# Patient Record
Sex: FEMALE | Race: WHITE | NOT HISPANIC OR LATINO | Employment: PART TIME | URBAN - METROPOLITAN AREA
[De-identification: names, ages, dates, MRNs, and addresses within clinical notes are randomized per-mention and may not be internally consistent; named-entity substitution may affect disease eponyms.]

---

## 2017-01-06 ENCOUNTER — OFFICE VISIT (OUTPATIENT)
Dept: PSYCHIATRY | Facility: CLINIC | Age: 47
End: 2017-01-06
Payer: COMMERCIAL

## 2017-01-06 VITALS
HEART RATE: 84 BPM | TEMPERATURE: 98.2 F | SYSTOLIC BLOOD PRESSURE: 106 MMHG | HEIGHT: 61 IN | BODY MASS INDEX: 38.51 KG/M2 | DIASTOLIC BLOOD PRESSURE: 68 MMHG | WEIGHT: 204 LBS | OXYGEN SATURATION: 94 %

## 2017-01-06 DIAGNOSIS — F41.1 GENERALIZED ANXIETY DISORDER: Primary | ICD-10-CM

## 2017-01-06 DIAGNOSIS — F33.1 MAJOR DEPRESSIVE DISORDER, RECURRENT EPISODE, MODERATE (H): ICD-10-CM

## 2017-01-06 DIAGNOSIS — F33.1 MODERATE RECURRENT MAJOR DEPRESSION (H): ICD-10-CM

## 2017-01-06 PROCEDURE — 99214 OFFICE O/P EST MOD 30 MIN: CPT | Performed by: NURSE PRACTITIONER

## 2017-01-06 RX ORDER — BUPROPION HYDROCHLORIDE 450 MG/1
450 TABLET, FILM COATED, EXTENDED RELEASE ORAL EVERY MORNING
Qty: 90 TABLET | Refills: 1 | Status: SHIPPED | OUTPATIENT
Start: 2017-01-06 | End: 2017-04-26

## 2017-01-06 RX ORDER — BUSPIRONE HYDROCHLORIDE 15 MG/1
15 TABLET ORAL 2 TIMES DAILY
Qty: 180 TABLET | Refills: 1 | Status: SHIPPED | OUTPATIENT
Start: 2017-01-06 | End: 2017-02-09

## 2017-01-06 RX ORDER — PROPRANOLOL HCL 60 MG
60 CAPSULE, EXTENDED RELEASE 24HR ORAL DAILY
Qty: 90 CAPSULE | Refills: 1 | Status: SHIPPED | OUTPATIENT
Start: 2017-01-06 | End: 2017-04-26

## 2017-01-06 ASSESSMENT — ANXIETY QUESTIONNAIRES
3. WORRYING TOO MUCH ABOUT DIFFERENT THINGS: NOT AT ALL
6. BECOMING EASILY ANNOYED OR IRRITABLE: SEVERAL DAYS
5. BEING SO RESTLESS THAT IT IS HARD TO SIT STILL: NOT AT ALL
1. FEELING NERVOUS, ANXIOUS, OR ON EDGE: SEVERAL DAYS
IF YOU CHECKED OFF ANY PROBLEMS ON THIS QUESTIONNAIRE, HOW DIFFICULT HAVE THESE PROBLEMS MADE IT FOR YOU TO DO YOUR WORK, TAKE CARE OF THINGS AT HOME, OR GET ALONG WITH OTHER PEOPLE: SOMEWHAT DIFFICULT
GAD7 TOTAL SCORE: 3
7. FEELING AFRAID AS IF SOMETHING AWFUL MIGHT HAPPEN: NOT AT ALL
2. NOT BEING ABLE TO STOP OR CONTROL WORRYING: SEVERAL DAYS

## 2017-01-06 ASSESSMENT — PATIENT HEALTH QUESTIONNAIRE - PHQ9: 5. POOR APPETITE OR OVEREATING: NOT AT ALL

## 2017-01-06 NOTE — Clinical Note
Brent Weinberg, Psychiatry update. Meeta is doing much better. I will see her for at least one more visit. If she continues to do well, I will return her care to you. If any questions, please let me know. Ivon

## 2017-01-06 NOTE — MR AVS SNAPSHOT
After Visit Summary   1/6/2017    Meeta Rowell    MRN: [id number]           Patient Information     Date Of Birth          1970        Visit Information        Provider Department      1/6/2017 2:45 PM Ivon Tripp NP [facility]        Today's Diagnoses     Generalized anxiety disorder    -  1     Moderate recurrent major depression (H)         Major depressive disorder, recurrent episode, moderate (H)           Care Instructions    Treatment Plan:    Continue Inderal (propranolol) 60 mg by mouth daily.      Continue Buspar (buspirone) 15 mg by mouth in AM and 15 mg by mouth in PM. May consider dose increase up to 30 mg BID.      Continue Wellbutrin (buproprion)  mg by mouth in AM.     Continue trazodone 400 mg by mouth at bedtime. Consider change to Neurontin (gabapentin).    Discontinue Effexor (venlafaxine).    Continue Prozac (fluoxetine) 20 mg by mouth daily. If needed, may increase to 40 mg mouth daily.    Continue all other medications as reviewed per electronic medical record today.     All questions addressed. Education and counseling completed regarding risks and benefits of medications and psychotherapy options.    Safety plan was reviewed. To the Emergency Department as needed or call after hours crisis line at [phone] or [phone].     To schedule individual or family therapy, call [facility] at [phone].    Schedule an appointment with me in 2 months or sooner as needed. Call [facility] at [phone] to schedule.    Follow up with primary care provider as planned or for acute medical concerns.    Call the psychiatric nurse line with medication questions or concerns at [phone].    My Practice Policy was reviewed and signed: YES     MyChart may be used to communicate with your provider, but this is not intended to be used for emergencies.        Follow-ups after your visit        Your next 10  "appointments already scheduled     Feb 23, 2017  2:30 PM   [facility]    [address]  [facility] [address] [phone]              Who to contact     If you have questions or need follow up information about today's clinic visit or your schedule please contact [facility] directly at [phone].  Normal or non-critical lab and imaging results will be communicated to you by [employer], letter or phone within 4 business days after the clinic has received the results. If you do not hear from us within 7 days, please contact the clinic through [employer] or phone. If you have a critical or abnormal lab result, we will notify you by phone as soon as possible.  Submit refill requests through [employer] or call your pharmacy and they will forward the refill request to us. Please allow 3 business days for your refill to be completed.          Additional Information About Your Visit        [employer] Information     [employer] gives you secure access to your electronic health record. If you see a primary care provider, you can also send messages to your care team and make appointments. If you have questions, please call your primary care clinic.  If you do not have a primary care provider, please call [phone] and they will assist you.        Care EveryWhere ID     This is your Care EveryWhere ID. This could be used by other organizations to access your [facility] medical records  [id number]        Your Vitals Were     Pulse Temperature Height BMI (Body Mass Index) Pulse Oximetry       84 98.2  F (36.8  C) (Oral) 5' 1\" (1.549 m) 38.57 kg/m2 94%        Blood Pressure from Last 3 Encounters:   01/06/17 106/68   12/15/16 108/72   12/05/16 122/70    Weight from Last 3 Encounters:   01/06/17 204 lb (92.534 kg)   12/15/16 203 lb (92.08 kg)   12/05/16 202 lb (91.627 kg)              Today, you had the following  "    No orders found for display         Today's Medication Changes          These changes are accurate as of: 1/6/17  3:20 PM.  If you have any questions, ask your nurse or doctor.               These medicines have changed or have updated prescriptions.        Dose/Directions    BuPROPion HCl ER (XL) 450 MG Tb24   This may have changed:    - medication strength  - how much to take  - additional instructions   Used for:  Moderate recurrent major depression (H)   Changed by:  Ivon Tripp NP        Dose:  450 mg   Take 450 mg by mouth every morning Fill at Patient request. Can substitute one 150 mg and one 300 mg if needed.   Quantity:  90 tablet   Refills:  1            Where to get your medicines      [place] [facility] [address] [facility]  [address] [facility] [address]     Phone:  [phone]    - BuPROPion HCl ER (XL) 450 MG Tb24  - busPIRone 15 MG tablet  - propranolol 60 MG 24 hr capsule             Primary Care Provider Office Phone # Fax #    Jesenia Weinberg [phone]       [address] [place] [address]        Thank you!     Thank you for choosing [facility]  for your care. Our goal is always to provide you with excellent care. Hearing back from our patients is one way we can continue to improve our services. Please take a few minutes to complete the written survey that you may receive in the mail after your visit with us. Thank you!             Your Updated Medication List - Protect others around you: Learn how to safely use, store and throw away your medicines at www.disposemymeds.org.          This list is accurate as of: 1/6/17  3:20 PM.  Always use your most recent med list.                   Brand Name Dispense Instructions for use    BuPROPion HCl ER (XL) 450 MG Tb24     90 tablet    Take 450 mg by mouth every morning Fill at Patient request. Can substitute one  150 mg and one 300 mg if needed.       busPIRone 15 MG tablet    BUSPAR    180 tablet    Take 1 tablet (15 mg) by mouth 2 times daily       drospirenone-ethinyl estradiol 3-0.03 MG per tablet    KANA 28    112 tablet    Take 1 tablet by mouth daily Take active pills daily       FLUoxetine 20 MG capsule    PROzac    60 capsule    Start Prozac (fluoxetine) 20 mg by mouth daily for one week then increase to 40 mg by mouth daily. For depression and anxiety.       LANsoprazole 30 MG CR capsule    PREVACID     Take 30 mg by mouth daily       levothyroxine 200 MCG tablet    SYNTHROID/LEVOTHROID    90 tablet    Take 1 tablet (200 mcg) by mouth daily       propranolol 60 MG 24 hr capsule    INDERAL LA    90 capsule    Take 1 capsule (60 mg) by mouth daily       REMICADE 100 MG injection   Generic drug:  inFLIXimab      Inject 3 mg/kg into the vein       traZODone 100 MG tablet    DESYREL    120 tablet    Take 1-4 tablets (100-400 mg) by mouth At Bedtime

## 2017-01-06 NOTE — NURSING NOTE
"Chief Complaint   Patient presents with     RECHECK     F/U on depression/anxiety       Initial /68 mmHg  Pulse 84  Temp(Src) 98.2  F (36.8  C) (Oral)  Ht 5' 1\" (1.549 m)  Wt 204 lb (92.534 kg)  BMI 38.57 kg/m2  SpO2 94% Estimated body mass index is 38.57 kg/(m^2) as calculated from the following:    Height as of this encounter: 5' 1\" (1.549 m).    Weight as of this encounter: 204 lb (92.534 kg).  BP completed using cuff size: blaise Nesbitt MA      "

## 2017-01-06 NOTE — PROGRESS NOTES
"    Outpatient Psychiatric Progress Note    Name: Meeta Rowell   : 1970  Date: 2017                         Primary Care Provider: Jesenia Weinberg MD  Therapist: None    PHQ-9 scores:  PHQ-9 SCORE 2016   Total Score 15 18 9       JANIS-7 scores:  JANIS-7 SCORE 2016   Total Score 6 9 3       Patient Identification:  Patient is a 46 year old year old,   White American female  who presents for return visit with me.  Patient is currently employed part time. Patient attended the session alone. Patient prefers to be called: \"Meeta\"    Interim History:    I last saw Meeta Rowell for outpatient psychiatry Consultation on 2016.     During that appointment, we Continue Inderal (propranolol) 60 mg by mouth daily.      Continue Buspar (buspirone) 15 mg by mouth in AM and 15 mg by mouth in PM. May consider dose increase up to 30 mg BID.      Reduce Wellbutrin (buproprion) XL to 300 mg by mouth in AM.      Continue trazodone 400 mg by mouth at bedtime. Consider change to Neurontin (gabapentin).      Reduce Effexor (venlafaxine) XR to 150 mg by mouth daily starting 2016.  Reduce to 75 mg XR daily starting 2016. Then take 75 mg XR every other day for 2 weeks then stop.      Start Prozac (fluoxetine) 20 mg by mouth daily for one week then increase to 40 mg by mouth daily. For depression and anxiety and for discontinuation syndrome symptoms.       Current medications include:   Current Outpatient Prescriptions   Medication Sig     busPIRone (BUSPAR) 15 MG tablet Take 1 tablet (15 mg) by mouth 2 times daily     buPROPion (WELLBUTRIN XL) 150 MG 24 hr tablet Take 2 tablets (300 mg) by mouth every morning Reduced dose. Fill at Patient request.     FLUoxetine (PROZAC) 20 MG capsule Start Prozac (fluoxetine) 20 mg by mouth daily for one week then increase to 40 mg by mouth daily. For depression and anxiety.     propranolol (INDERAL LA) 60 MG capsule " "Take 1 capsule (60 mg) by mouth daily     levothyroxine (SYNTHROID, LEVOTHROID) 200 MCG tablet Take 1 tablet (200 mcg) by mouth daily     traZODone (DESYREL) 100 MG tablet Take 1-4 tablets (100-400 mg) by mouth At Bedtime     venlafaxine (EFFEXOR-XR) 75 MG 24 hr capsule Take 3 capsules (225 mg) by mouth daily     drospirenone-ethinyl estradiol (KANA 28) 3-0.03 MG per tablet Take 1 tablet by mouth daily Take active pills daily     inFLIXimab (REMICADE) 100 MG injection Inject 3 mg/kg into the vein     LANsoprazole (PREVACID) 30 MG capsule Take 30 mg by mouth daily      No current facility-administered medications for this visit.       The [place] Prescription Monitoring Program has been reviewed and there are no concerns about diversionary activity for controlled substances at this time.      Still taking Wellbutrin (buproprion)  mg, Effexor (venlafaxine) 75 mg, Prozac (fluoxetine) 20 mg    Meeta Rowell reports mood has been: \"more depression over the holidays\"  Anxiety has been: \"a lot better\" No panic attacks.   Sleep has been: \"good\" no nightmares or strange dreams.  PHQ9 and GAD7 scores were reviewed today.   Medication side effects: Denies  Current stressors include:  Financial Stress, IRS Audit, Lost Home, Bro wife relationship stress  Coping mechanisms and supports include: Family, Hobbies and Friends, Reading, Video Games, TV, Coloring     Past Medical History   Diagnosis Date     Personal history of other genital system and obstetric disorders(V13.29)      Regional enteritis of small intestine (H) 2003     ileal disease     Thyroid disease      hypo     Crohn's disease (H)      Endometriosis      GERD (gastroesophageal reflux disease)      Depression      Cancer (H) 12/11/15     thyroid      has a past medical history of Personal history of other genital system and obstetric disorders(V13.29); Regional enteritis of small intestine (H) (2003); Thyroid disease; Crohn's disease (H); Endometriosis; " "GERD (gastroesophageal reflux disease); Depression; and Cancer (H) (12/11/15). She also has no past medical history of PONV (postoperative nausea and vomiting), Malignant hyperthermia, Coagulation disorder (H), Thrombosis of leg, Chronic infection, or Sleep apnea.    Social History:  Current Living situation:  [place], [place] with Spouse/Partner and 2 sons and dog . Feels safe at home.  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    Review of Systems:  10 systems (general, cardiovascular, respiratory, eyes, ENT, endocrine, GI, , M/S, neurological) were reviewed. Most pertinent finding(s) is/are: chronic pain, fatigue, dry mouth more lately. The remaining systems are all unremarkable.    Mental Status Examination:  Appearance:  awake, alert, adequately groomed, no apparent distress and moderately obese  Attitude:  cooperative    Eye Contact:  adequate  Gait and Station: Normal, No assistive Devices used and No dizziness or falls  Psychomotor Behavior:  no evidence of tardive dyskinesia, dystonia, or tics  Oriented to:  time, person, and place  Attention Span and Concentration:  adequate  Speech:  clear, coherent, regular rate, regular rhythm and fluent  Language: Able to read and write  Mood:  \"better\"  Affect:  intensity is brighter, reactive  Associations:  no loose associations  Thought Process:  logical, linear and goal oriented  Thought Content:  no evidence of suicidal ideation or homicidal ideation, no evidence of psychotic thought, no auditory hallucinations present, no visual " hallucinations present and Appropriate to Interview  Recent and Remote Memory:  intact Not formally assessed.  Fund of Knowledge: appropriate  Insight:  good  Judgment:  intact  Impulse Control:  intact    Suicide Risk Assessment:  Today Meeta Rowell reports psychosocial stressors. In addition, there are notable risk factors for self-harm, including age and anxiety However, risk is mitigated by commitment to family, absence of past attempts, ability to volunteer a safety plan, history of seeking help when needed, future oriented, no access to firearms or weapons, denies suicidal intent or plan and denies homicidal ideation, intent, or plan. Therefore, based on all available evidence including the factors cited above, Meeta Rowell does not appear to be at imminent risk for self-harm, does not meet criteria for a 72-hr hold, and therefore remains appropriate for ongoing outpatient level of care.  A thorough assessment of risk factors related to suicide and self-harm have been reviewed and are noted above. There was no deceit detected, and the patient presented in a manner that was believable.     DSM5 Diagnosis:  296.33 Major Depressive Disorder, Recurrent Episode, Severe   300.02 (F41.1) Generalized Anxiety Disorder  309.81 (F43.10) Posttraumatic Stress Disorder  Without dissociative symptoms  Obesity per BMI of 38.55    Medical comorbidities include:   Patient Active Problem List    Diagnosis Date Noted     Generalized anxiety disorder 11/11/2016     Priority: Medium     Papillary carcinoma in situ 12/29/2015     Priority: Medium     Four foci on thyroid pathology, has endo appointment scheduled       Postsurgical hypothyroidism 12/28/2015     Priority: Medium     Thyroid cancer (H) 12/11/2015     Priority: Medium     Regional enteritis (H) 10/14/2015     Priority: Medium     Overview:   Followed by [place] [facility], Remicade since 2003       Obesity 10/08/2013     Priority: Medium     Impaired fasting glucose  07/27/2010     Priority: Medium     CARDIOVASCULAR SCREENING; LDL GOAL LESS THAN 160 02/10/2010     Priority: Medium     Moderate recurrent major depression (H) 07/14/2009     Priority: Medium     Major depressive disorder, recurrent episode, moderate (H) 07/14/2009     Priority: Medium     Regional enteritis of small intestine 04/08/2003     Priority: Medium     Crohns disease, well controlled on remicade.  Follows with [facility].         Psychosocial & Contextual Factors:  Occupational Difficulties, Parent/Child Relationship Difficulties, Financial Difficulties, Relationship Difficulties, Phase of Life Difficulties and Medical Comorbidites    Assessment:  Meeta Rowell reports improvement in symptoms. Medication side effects and alternatives were reviewed.     Treatment Plan:    Continue Inderal (propranolol) 60 mg by mouth daily.      Continue Buspar (buspirone) 15 mg by mouth in AM and 15 mg by mouth in PM. May consider dose increase up to 30 mg BID.      Continue Wellbutrin (buproprion)  mg by mouth in AM.     Continue trazodone 400 mg by mouth at bedtime. Consider change to Neurontin (gabapentin).    Discontinue Effexor (venlafaxine).    Continue Prozac (fluoxetine) 20 mg by mouth daily. If needed, may increase to 40 mg mouth daily.    Continue all other medications as reviewed per electronic medical record today.     All questions addressed. Education and counseling completed regarding risks and benefits of medications and psychotherapy options.    Safety plan was reviewed. To the Emergency Department as needed or call after hours crisis line at [phone] or [phone].     To schedule individual or family therapy, call [facility] at [phone].    Schedule an appointment with me in 2 months or sooner as needed. Call [facility] at [phone] to schedule.    Follow up with primary care provider as planned or for acute medical concerns.    Call the psychiatric  nurse line with medication questions or concerns at [phone].    My Practice Policy was reviewed and signed: YES     MyChart may be used to communicate with your provider, but this is not intended to be used for emergencies.    Administrative Billing:   Time spent with patient was 30 minutes and greater than 50% of time or 20 minutes was spent in counseling and coordination of care.    Signed:   Ivon Tripp, PhD, APRN, CNP   Psychiatry

## 2017-01-06 NOTE — PATIENT INSTRUCTIONS
Treatment Plan:    Continue Inderal (propranolol) 60 mg by mouth daily.      Continue Buspar (buspirone) 15 mg by mouth in AM and 15 mg by mouth in PM. May consider dose increase up to 30 mg BID.      Continue Wellbutrin (buproprion)  mg by mouth in AM.     Continue trazodone 400 mg by mouth at bedtime. Consider change to Neurontin (gabapentin).    Discontinue Effexor (venlafaxine).    Continue Prozac (fluoxetine) 20 mg by mouth daily. If needed, may increase to 40 mg mouth daily.    Continue all other medications as reviewed per electronic medical record today.     All questions addressed. Education and counseling completed regarding risks and benefits of medications and psychotherapy options.    Safety plan was reviewed. To the Emergency Department as needed or call after hours crisis line at [phone] or [phone].     To schedule individual or family therapy, call [facility] at [phone].    Schedule an appointment with me in 2 months or sooner as needed. Call [facility] at [phone] to schedule.    Follow up with primary care provider as planned or for acute medical concerns.    Call the psychiatric nurse line with medication questions or concerns at [phone].    My Practice Policy was reviewed and signed: YES     MyChart may be used to communicate with your provider, but this is not intended to be used for emergencies.

## 2017-01-07 ASSESSMENT — PATIENT HEALTH QUESTIONNAIRE - PHQ9: SUM OF ALL RESPONSES TO PHQ QUESTIONS 1-9: 9

## 2017-01-07 ASSESSMENT — ANXIETY QUESTIONNAIRES: GAD7 TOTAL SCORE: 3

## 2017-02-08 ENCOUNTER — TELEPHONE (OUTPATIENT)
Dept: PSYCHIATRY | Facility: CLINIC | Age: 47
End: 2017-02-08

## 2017-02-08 DIAGNOSIS — F33.1 MAJOR DEPRESSIVE DISORDER, RECURRENT EPISODE, MODERATE (H): Primary | ICD-10-CM

## 2017-02-09 RX ORDER — BUSPIRONE HYDROCHLORIDE 30 MG/1
30 TABLET ORAL 2 TIMES DAILY
Qty: 60 TABLET | Refills: 1 | Status: SHIPPED | OUTPATIENT
Start: 2017-02-09 | End: 2017-04-26

## 2017-02-09 NOTE — TELEPHONE ENCOUNTER
Meeta Rowell calling to request increase in her Buspar. Pt reported she spoke with Ivon Tripp, PhD, APRN, CNP about this at her last appt in January. Will route to provider.     From Ivon Tripp, PhD, APRN, CNP treatment plan 01/06/17  Treatment Plan:    Continue Inderal (propranolol) 60 mg by mouth daily.      Continue Buspar (buspirone) 15 mg by mouth in AM and 15 mg by mouth in PM. May consider dose increase up to 30 mg BID.      Continue Wellbutrin (buproprion)  mg by mouth in AM.    Continue trazodone 400 mg by mouth at bedtime. Consider change to Neurontin (gabapentin).    Discontinue Effexor (venlafaxine).    Continue Prozac (fluoxetine) 20 mg by mouth daily. If needed, may increase to 40 mg mouth daily.    Continue all other medications as reviewed per electronic medical record today.      All questions addressed. Education and counseling completed regarding risks and benefits of medications and psychotherapy options.    Safety plan was reviewed. To the Emergency Department as needed or call after hours crisis line at [phone] or [phone].      To schedule individual or family therapy, call [facility] at [phone].    Schedule an appointment with me in 2 months or sooner as needed. Call [facility] at [phone] to schedule.    Follow up with primary care provider as planned or for acute medical concerns.    Call the psychiatric nurse line with medication questions or concerns at [phone].    My Practice Policy was reviewed and signed: YES      MyChart may be used to communicate with your provider, but this is not intended to be used for emergencies.    Administrative Billing:   Time spent with patient was 30 minutes and greater than 50% of time or 20 minutes was spent in counseling and coordination of care.    Signed:    Ivon Tripp, PhD, APRN, CNP    Psychiatry

## 2017-02-27 ENCOUNTER — TELEPHONE (OUTPATIENT)
Dept: PSYCHIATRY | Facility: CLINIC | Age: 47
End: 2017-02-27

## 2017-02-27 DIAGNOSIS — F33.1 MODERATE RECURRENT MAJOR DEPRESSION (H): ICD-10-CM

## 2017-02-27 RX ORDER — FLUOXETINE 40 MG/1
40 CAPSULE ORAL DAILY
Qty: 30 CAPSULE | Refills: 0 | Status: SHIPPED | OUTPATIENT
Start: 2017-02-27 | End: 2017-03-27

## 2017-02-27 NOTE — TELEPHONE ENCOUNTER
I spoke to Meeta Rowell. We refilled this medication per RN protocol. She was transferred to [facility].    Last Office Visit with [facility] primary care provider: 01/06/2017.  Next 5 appointments (look out 90 days)     Mar 22, 2017  2:45 PM CDT   [facility]von Tripp [facility]    [address]  [address]  [facility] [address] [phone]                   Last PHQ-9 score on record=   PHQ-9 SCORE 1/6/2017   Total Score MyChart -   Total Score 9

## 2017-02-27 NOTE — TELEPHONE ENCOUNTER
Reason for call: Patient is out of Prozac and said the pharmacy has called twice trying to get it refilled.  She is completely out and concerned about side effects of not taking it.     Can we leave a detailed message: Yes

## 2017-03-03 ENCOUNTER — TRANSFERRED RECORDS (OUTPATIENT)
Dept: HEALTH INFORMATION MANAGEMENT | Facility: CLINIC | Age: 47
End: 2017-03-03

## 2017-03-27 ENCOUNTER — MYC REFILL (OUTPATIENT)
Dept: PSYCHIATRY | Facility: CLINIC | Age: 47
End: 2017-03-27

## 2017-03-27 DIAGNOSIS — F33.1 MODERATE RECURRENT MAJOR DEPRESSION (H): ICD-10-CM

## 2017-03-27 RX ORDER — FLUOXETINE 40 MG/1
40 CAPSULE ORAL DAILY
Qty: 14 CAPSULE | Refills: 0 | Status: SHIPPED | OUTPATIENT
Start: 2017-03-27 | End: 2017-04-12

## 2017-03-27 NOTE — TELEPHONE ENCOUNTER
Chart reviewed. Patient was given a bridge refill of Prozac earlier in March. She had a scheduled appointment with Ivon on 3/22/17, but late cancelled this. She will needs another appointment.     Me        2/27/17 2:07 PM   Note      I spoke to Meeta Rowell. We refilled this medication per RN protocol. She was transferred to scheduling.     Last Office Visit with [facility] primary care provider: 01/06/2017.       Next 5 appointments (look out 90 days)      Mar 22, 2017 2:45 PM CDT   [facility] Visit with Ivon Tripp NP   [facility]     [address]  [address]  [facility] [address] [phone]                         Last PHQ-9 score on record=   PHQ-9 SCORE 1/6/2017   Total Score MyChart -   Total Score 9

## 2017-03-27 NOTE — TELEPHONE ENCOUNTER
Message from [employer]Index[employer]:  Original authorizing provider: TOBI Lemus would like a refill of the following medications:  FLUoxetine (PROZAC) 40 MG capsule [Ivon Tripp NP]    Preferred pharmacy: [facility] [address] [facility]    Comment:

## 2017-04-12 ENCOUNTER — MYC REFILL (OUTPATIENT)
Dept: PSYCHIATRY | Facility: CLINIC | Age: 47
End: 2017-04-12

## 2017-04-12 DIAGNOSIS — F33.1 MODERATE RECURRENT MAJOR DEPRESSION (H): ICD-10-CM

## 2017-04-12 RX ORDER — FLUOXETINE 40 MG/1
40 CAPSULE ORAL DAILY
Qty: 14 CAPSULE | Refills: 0 | Status: SHIPPED | OUTPATIENT
Start: 2017-04-12 | End: 2017-04-26

## 2017-04-12 NOTE — TELEPHONE ENCOUNTER
Message from PeerPongt:  Original authorizing provider: TOBI Lemus would like a refill of the following medications:  FLUoxetine (PROZAC) 40 MG capsule [Ivon Tripp NP]    Preferred pharmacy: [facility] [address] [facility]    Comment:  I have a follow up appointment on 4/26, its the earliest I could get. Would you please give me enough to last through 4/26? Thanks

## 2017-04-26 ENCOUNTER — OFFICE VISIT (OUTPATIENT)
Dept: PSYCHIATRY | Facility: CLINIC | Age: 47
End: 2017-04-26
Payer: COMMERCIAL

## 2017-04-26 VITALS
OXYGEN SATURATION: 97 % | SYSTOLIC BLOOD PRESSURE: 110 MMHG | WEIGHT: 204 LBS | DIASTOLIC BLOOD PRESSURE: 60 MMHG | BODY MASS INDEX: 38.55 KG/M2 | TEMPERATURE: 98.2 F | HEART RATE: 81 BPM

## 2017-04-26 DIAGNOSIS — Z13.220 LIPID SCREENING: ICD-10-CM

## 2017-04-26 DIAGNOSIS — D50.9 IRON DEFICIENCY ANEMIA, UNSPECIFIED IRON DEFICIENCY ANEMIA TYPE: ICD-10-CM

## 2017-04-26 DIAGNOSIS — F33.1 MODERATE RECURRENT MAJOR DEPRESSION (H): ICD-10-CM

## 2017-04-26 DIAGNOSIS — F41.1 GENERALIZED ANXIETY DISORDER: ICD-10-CM

## 2017-04-26 DIAGNOSIS — F33.1 MAJOR DEPRESSIVE DISORDER, RECURRENT EPISODE, MODERATE (H): ICD-10-CM

## 2017-04-26 DIAGNOSIS — G25.81 RESTLESS LEGS SYNDROME (RLS): Primary | ICD-10-CM

## 2017-04-26 PROBLEM — E66.01 MORBID OBESITY (H): Status: ACTIVE | Noted: 2017-04-26

## 2017-04-26 PROCEDURE — 83540 ASSAY OF IRON: CPT | Performed by: NURSE PRACTITIONER

## 2017-04-26 PROCEDURE — 83550 IRON BINDING TEST: CPT | Performed by: NURSE PRACTITIONER

## 2017-04-26 PROCEDURE — 82728 ASSAY OF FERRITIN: CPT | Performed by: NURSE PRACTITIONER

## 2017-04-26 PROCEDURE — 99214 OFFICE O/P EST MOD 30 MIN: CPT | Performed by: NURSE PRACTITIONER

## 2017-04-26 PROCEDURE — 36415 COLL VENOUS BLD VENIPUNCTURE: CPT | Performed by: NURSE PRACTITIONER

## 2017-04-26 RX ORDER — BUSPIRONE HYDROCHLORIDE 30 MG/1
30 TABLET ORAL 2 TIMES DAILY
Qty: 180 TABLET | Refills: 1 | Status: SHIPPED | OUTPATIENT
Start: 2017-04-26 | End: 2017-10-26

## 2017-04-26 RX ORDER — FLUOXETINE 40 MG/1
40 CAPSULE ORAL DAILY
Qty: 90 CAPSULE | Refills: 1 | Status: SHIPPED | OUTPATIENT
Start: 2017-04-26 | End: 2017-10-26

## 2017-04-26 RX ORDER — BUPROPION HYDROCHLORIDE 450 MG/1
450 TABLET, FILM COATED, EXTENDED RELEASE ORAL EVERY MORNING
Qty: 90 TABLET | Refills: 1 | Status: SHIPPED | OUTPATIENT
Start: 2017-04-26 | End: 2017-11-17

## 2017-04-26 RX ORDER — PROPRANOLOL HCL 60 MG
60 CAPSULE, EXTENDED RELEASE 24HR ORAL DAILY
Qty: 90 CAPSULE | Refills: 1 | Status: SHIPPED | OUTPATIENT
Start: 2017-04-26 | End: 2017-07-10

## 2017-04-26 ASSESSMENT — ANXIETY QUESTIONNAIRES
2. NOT BEING ABLE TO STOP OR CONTROL WORRYING: NOT AT ALL
3. WORRYING TOO MUCH ABOUT DIFFERENT THINGS: NOT AT ALL
5. BEING SO RESTLESS THAT IT IS HARD TO SIT STILL: NOT AT ALL
IF YOU CHECKED OFF ANY PROBLEMS ON THIS QUESTIONNAIRE, HOW DIFFICULT HAVE THESE PROBLEMS MADE IT FOR YOU TO DO YOUR WORK, TAKE CARE OF THINGS AT HOME, OR GET ALONG WITH OTHER PEOPLE: NOT DIFFICULT AT ALL
6. BECOMING EASILY ANNOYED OR IRRITABLE: NOT AT ALL
GAD7 TOTAL SCORE: 1
7. FEELING AFRAID AS IF SOMETHING AWFUL MIGHT HAPPEN: NOT AT ALL
1. FEELING NERVOUS, ANXIOUS, OR ON EDGE: NOT AT ALL

## 2017-04-26 ASSESSMENT — PATIENT HEALTH QUESTIONNAIRE - PHQ9: 5. POOR APPETITE OR OVEREATING: SEVERAL DAYS

## 2017-04-26 NOTE — Clinical Note
Brent Weinberg, I am sending Meeta back to you for care. She has 6 months worth of medication. More details in my note. Thank you for letting me care for this patient. Ivon

## 2017-04-26 NOTE — PATIENT INSTRUCTIONS
Treatment Plan:    Continue Inderal (propranolol) 60 mg by mouth daily.      Continue Buspar (buspirone) 30 mg by mouth in AM and 30 mg by mouth in PM.     Continue Wellbutrin (buproprion)  mg by mouth in AM.     Continue trazodone 400 mg by mouth at bedtime.     Continue Prozac (fluoxetine) 40 mg by mouth daily.    Continue all other medications as reviewed per electronic medical record today.     All questions addressed. Education and counseling completed regarding risks and benefits of medications and psychotherapy options.    Safety plan was reviewed. To the Emergency Department as needed or call after hours crisis line at [phone] or [phone].     To schedule individual or family therapy, call [facility] at [phone].     Schedule an appointment with me as needed. Call [facility] at [phone] to schedule.  Thank you for our work together in the Psychiatry Collaborative Care Model at [facility]. This is our last visit and I am returning your care back to your Primary Care Provider Jesenia Weinberg MD . If you are not doing well, please contact your Primary Care Provider office.     Follow up with primary care provider as planned or for acute medical concerns.    Call the psychiatric nurse line with medication questions or concerns at [phone].    My Practice Policy was reviewed and signed: YES     MyChart may be used to communicate with your provider, but this is not intended to be used for emergencies.

## 2017-04-26 NOTE — MR AVS SNAPSHOT
After Visit Summary   4/26/2017    Meeta Rowell    MRN: [id number]           Patient Information     Date Of Birth          1970        Visit Information        Provider Department      4/26/2017 12:45 PM Ivon Tripp NP [facility]        Today's Diagnoses     Moderate recurrent major depression (H)        Generalized anxiety disorder        Major depressive disorder, recurrent episode, moderate (H)          Care Instructions    Treatment Plan:    Continue Inderal (propranolol) 60 mg by mouth daily.      Continue Buspar (buspirone) 30 mg by mouth in AM and 30 mg by mouth in PM.     Continue Wellbutrin (buproprion)  mg by mouth in AM.     Continue trazodone 400 mg by mouth at bedtime.     Continue Prozac (fluoxetine) 40 mg by mouth daily.    Continue all other medications as reviewed per electronic medical record today.     All questions addressed. Education and counseling completed regarding risks and benefits of medications and psychotherapy options.    Safety plan was reviewed. To the Emergency Department as needed or call after hours crisis line at [phone] or [phone].     To schedule individual or family therapy, call [facility] at [phone].     Schedule an appointment with me as needed. Call [facility] at [phone] to schedule.  Thank you for our work together in the Psychiatry Collaborative Care Model at [facility]. This is our last visit and I am returning your care back to your Primary Care Provider Jesenia Weinberg MD . If you are not doing well, please contact your Primary Care Provider office.     Follow up with primary care provider as planned or for acute medical concerns.    Call the psychiatric nurse line with medication questions or concerns at [phone].    My Practice Policy was reviewed and signed: YES     MyChart may be used to communicate with your provider, but this is not  intended to be used for emergencies.        Follow-ups after your visit        Follow-up notes from your care team     Return if symptoms worsen or fail to improve.      Who to contact     If you have questions or need follow up information about today's clinic visit or your schedule please contact [facility] directly at [phone].  Normal or non-critical lab and imaging results will be communicated to you by MyChart, letter or phone within 4 business days after the clinic has received the results. If you do not hear from us within 7 days, please contact the clinic through [employer] or phone. If you have a critical or abnormal lab result, we will notify you by phone as soon as possible.  Submit refill requests through [employer] or call your pharmacy and they will forward the refill request to us. Please allow 3 business days for your refill to be completed.          Additional Information About Your Visit        MyChart Information     [employer] gives you secure access to your electronic health record. If you see a primary care provider, you can also send messages to your care team and make appointments. If you have questions, please call your primary care clinic.  If you do not have a primary care provider, please call [phone] and they will assist you.        Care EveryWhere ID     This is your Care EveryWhere ID. This could be used by other organizations to access your [facility] medical records  [id number]        Your Vitals Were     Pulse Temperature Pulse Oximetry BMI (Body Mass Index)          81 98.2  F (36.8  C) (Oral) 97% 38.55 kg/m2         Blood Pressure from Last 3 Encounters:   04/26/17 110/60   01/06/17 106/68   12/15/16 108/72    Weight from Last 3 Encounters:   04/26/17 204 lb (92.5 kg)   01/06/17 204 lb (92.5 kg)   12/15/16 203 lb (92.1 kg)              Today, you had the following     No orders found for display         Today's Medication Changes          These changes are  accurate as of: 4/26/17  1:12 PM.  If you have any questions, ask your nurse or doctor.               These medicines have changed or have updated prescriptions.        Dose/Directions    BusPIRone HCl 30 MG Tabs   This may have changed:  additional instructions   Used for:  Major depressive disorder, recurrent episode, moderate (H)   Changed by:  Ivon Tripp NP        Dose:  30 mg   Take 30 mg by mouth 2 times daily   Quantity:  180 tablet   Refills:  1            Where to get your medicines      These [place] [facility] [address] [facility]  [address] [facility] [address]     Phone:  [phone]     BuPROPion HCl ER (XL) 450 MG Tb24    BusPIRone HCl 30 MG Tabs    FLUoxetine 40 MG capsule    propranolol 60 MG 24 hr capsule                Primary Care Provider Office Phone # Fax #    Jesenia Weinberg [phone]       [address] [place] [address]        Thank you!     Thank you for choosing [facility]  for your care. Our goal is always to provide you with excellent care. Hearing back from our patients is one way we can continue to improve our services. Please take a few minutes to complete the written survey that you may receive in the mail after your visit with us. Thank you!             Your Updated Medication List - Protect others around you: Learn how to safely use, store and throw away your medicines at www.disposemymeds.org.          This list is accurate as of: 4/26/17  1:12 PM.  Always use your most recent med list.                   Brand Name Dispense Instructions for use    BuPROPion HCl ER (XL) 450 MG Tb24     90 tablet    Take 450 mg by mouth every morning Fill at Patient request. Can substitute one 150 mg and one 300 mg if needed.       BusPIRone HCl 30 MG Tabs     180 tablet    Take 30 mg by mouth 2 times daily       drospirenone-ethinyl estradiol 3-0.03 MG  per tablet    KANA 28    112 tablet    Take 1 tablet by mouth daily Take active pills daily       FLUoxetine 40 MG capsule    PROzac    90 capsule    Take 1 capsule (40 mg) by mouth daily       LANsoprazole 30 MG CR capsule    PREVACID     Take 30 mg by mouth daily       levothyroxine 200 MCG tablet    SYNTHROID/LEVOTHROID    90 tablet    Take 1 tablet (200 mcg) by mouth daily       propranolol 60 MG 24 hr capsule    INDERAL LA    90 capsule    Take 1 capsule (60 mg) by mouth daily       REMICADE 100 MG injection   Generic drug:  inFLIXimab      Inject 3 mg/kg into the vein       traZODone 100 MG tablet    DESYREL    120 tablet    Take 1-4 tablets (100-400 mg) by mouth At Bedtime

## 2017-04-26 NOTE — PROGRESS NOTES
"    Outpatient Psychiatric Progress Note    Name: Meeta Rowell   : 1970                    Primary Care Provider: Jesenia Weinberg MD - last visit 2016  Therapist: None    PHQ-9 scores:  PHQ-9 SCORE 2016   Total Score 18 9 8       JANIS-7 scores:  JANIS-7 SCORE 2016   Total Score 9 3 1       Patient Identification:  Patient is a 46 year old year old,  White American female who presents for return visit with me. Patient is currently employed part time. Patient attended the session alone. Patient prefers to be called: \"Meeta\"    Interim History:    I last saw Meeta Rowell for outpatient psychiatry Return Visit on 2017.     During that appointment, we Continue Inderal (propranolol) 60 mg by mouth daily.      Continue Buspar (buspirone) 15 mg by mouth in AM and 15 mg by mouth in PM. May consider dose increase up to 30 mg BID.      Continue Wellbutrin (buproprion)  mg by mouth in AM.     Continue trazodone 400 mg by mouth at bedtime. Consider change to Neurontin (gabapentin).    Discontinue Effexor (venlafaxine).    Continue Prozac (fluoxetine) 20 mg by mouth daily. If needed, may increase to 40 mg mouth daily.     Current medications include:   Current Outpatient Prescriptions   Medication Sig     FLUoxetine (PROZAC) 40 MG capsule Take 1 capsule (40 mg) by mouth daily     busPIRone 30 MG TABS Take 30 mg by mouth 2 times daily Increased dose.     propranolol (INDERAL LA) 60 MG 24 hr capsule Take 1 capsule (60 mg) by mouth daily     buPROPion 450 MG TB24 Take 450 mg by mouth every morning Fill at Patient request. Can substitute one 150 mg and one 300 mg if needed.     levothyroxine (SYNTHROID, LEVOTHROID) 200 MCG tablet Take 1 tablet (200 mcg) by mouth daily     traZODone (DESYREL) 100 MG tablet Take 1-4 tablets (100-400 mg) by mouth At Bedtime     drospirenone-ethinyl estradiol (KANA 28) 3-0.03 MG per tablet Take 1 tablet by mouth " "daily Take active pills daily     inFLIXimab (REMICADE) 100 MG injection Inject 3 mg/kg into the vein     LANsoprazole (PREVACID) 30 MG capsule Take 30 mg by mouth daily      No current facility-administered medications for this visit.        The [place] Prescription Monitoring Program has been reviewed and there are no concerns about diversionary activity for controlled substances at this time.      I was able to review most recent Primary Care Provider, specialty provider, and therapy visit notes that I have access to.     Meeta Rowell reports mood has been: \"good\" still lack of energy. No mood swings.   Work is going well. Has been doing well.   Anxiety has been: \"good\" no panic  Sleep has been: \"okay, changes with work\" no nightmares  Focus and concentration \"fine\"  PHQ9 and GAD7 scores were reviewed today.   Medication side effects: Denies  Current stressors include:  Financial Stress, IRS Audit, Lost Home, Bro wife relationship stress  Coping mechanisms and supports include: Family, Hobbies and Friends, Reading, Video Games, TV, Coloring      Past Medical History:   Diagnosis Date     Cancer (H) 12/11/15    thyroid     Crohn's disease (H)      Depression      Endometriosis      GERD (gastroesophageal reflux disease)      Personal history of other genital system and obstetric disorders(V13.29)      Regional enteritis of small intestine (H) 2003    ileal disease     Thyroid disease     hypo      has a past medical history of Cancer (H) (12/11/15); Crohn's disease (H); Depression; Endometriosis; GERD (gastroesophageal reflux disease); Personal history of other genital system and obstetric disorders(V13.29); Regional enteritis of small intestine (H) (2003); and Thyroid disease. She also has no past medical history of Chronic infection; Coagulation disorder (H); Malignant hyperthermia; PONV (postoperative nausea and vomiting); Sleep apnea; or Thrombosis of leg.    Social History:  Current Living situation:  " "[place], [place] with Spouse/Partner and 2 sons and dog. Feels safe at home.  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   Current use of drugs or alcohol: Denies   Tobacco use: No  Caffeine: Yes  1-2 cups/day of coffee, 1-2 sodas/day  Recovery Programming Involvement: None    Vital Signs:   /60 (BP Location: Right arm, Patient Position: Chair, Cuff Size: Adult Large)  Pulse 81  Temp 98.2  F (36.8  C) (Oral)  Wt 204 lb (92.5 kg)  SpO2 97%  BMI 38.55 kg/m2    Labs:  Most recent laboratory results reviewed and the pertinent results include:   Office Visit on 12/15/2016   Component Date Value Ref Range Status     T4 Free 12/15/2016 1.32  0.76 - 1.46 ng/dL Final     Free T3 12/15/2016 2.2* 2.3 - 4.2 pg/mL Final     TSH 12/15/2016 0.62  0.40 - 4.00 mU/L Final     Lab Scanned Result 12/15/2016 THYROG & ANTIBODIES-Scanned   Final-Edited     GI specialty appointment- 3/3/2017    Review of Systems:  10 systems (general, cardiovascular, respiratory, eyes, ENT, endocrine, GI, , M/S, neurological) were reviewed. Most pertinent finding(s) is/are: chronic pain, fatigue, dry mouth- drinking more water lately, wears contacts. The remaining systems are all unremarkable.    Mental Status Examination:  Appearance:  awake, alert, adequately groomed, no apparent distress and moderately obese  Attitude:  cooperative    Eye Contact:  adequate  Gait and Station: Normal, No assistive Devices used and No dizziness or falls  Psychomotor Behavior:  no evidence of tardive dyskinesia, dystonia, or tics  Oriented to:  time, person, and place  Attention Span and Concentration:  adequate  Speech:  clear, coherent, regular rate, regular rhythm and fluent  Language: Able to read and write  Mood:  \"better\"  Affect:  intensity is brighter, reactive  Associations:  no loose associations  Thought Process:  logical, linear and goal oriented  Thought Content:  no evidence of suicidal ideation or homicidal " ideation, no evidence of psychotic thought, no auditory hallucinations present, no visual hallucinations present and Appropriate to Interview  Recent and Remote Memory:  intact , word finding troubles continue. Not formally assessed.  Fund of Knowledge: appropriate  Insight:  good  Judgment:  intact  Impulse Control:  intact    Suicide Risk Assessment:  Today Meeta Rowell reports psychosocial stressors. In addition, there are notable risk factors for self-harm, including age and anxiety However, risk is mitigated by commitment to family, absence of past attempts, ability to volunteer a safety plan, history of seeking help when needed, future oriented, no access to firearms or weapons, denies suicidal intent or plan and denies homicidal ideation, intent, or plan. Therefore, based on all available evidence including the factors cited above, Meeta Rowell does not appear to be at imminent risk for self-harm, does not meet criteria for a 72-hr hold, and therefore remains appropriate for ongoing outpatient level of care.  A thorough assessment of risk factors related to suicide and self-harm have been reviewed and are noted above. Local community safety resources reviewed and printed for patient to use if needed. There was no deceit detected, and the patient presented in a manner that was believable.     DSM5 Diagnosis:  296.33 Major Depressive Disorder, Recurrent Episode, Severe   300.02 (F41.1) Generalized Anxiety Disorder  309.81 (F43.10) Posttraumatic Stress Disorder  Without dissociative symptoms  Obesity per BMI of 38.55    Medical comorbidities include:   Patient Active Problem List    Diagnosis Date Noted     Morbid obesity (H) 04/26/2017     Priority: High     Generalized anxiety disorder 11/11/2016     Priority: Medium     Papillary carcinoma in situ 12/29/2015     Priority: Medium     Four foci on thyroid pathology, has endo appointment scheduled       Postsurgical hypothyroidism 12/28/2015      Priority: Medium     Thyroid cancer (H) 12/11/2015     Priority: Medium     Regional enteritis (H) 10/14/2015     Priority: Medium     Overview:   Followed by [facility], Remicade since 2003       Obesity 10/08/2013     Priority: Medium     Impaired fasting glucose 07/27/2010     Priority: Medium     CARDIOVASCULAR SCREENING; LDL GOAL LESS THAN 160 02/10/2010     Priority: Medium     Moderate recurrent major depression (H) 07/14/2009     Priority: Medium     Major depressive disorder, recurrent episode, moderate (H) 07/14/2009     Priority: Medium     Regional enteritis of small intestine 04/08/2003     Priority: Medium     Crohns disease, well controlled on remicade.  Follows with [facility].         Psychosocial & Contextual Factors:  Occupational Difficulties, Parent/Child Relationship Difficulties, Financial Difficulties, Relationship Difficulties, Phase of Life Difficulties and Medical Comorbidites    Assessment:  Meeta Rowell reports ongoing mood and anxiety improvement. Medication side effects and alternatives were reviewed. Health promotion activities recommended and reviewed today. Has struggled with Restless Leg Syndrome for years. Recommend checking ferretin and iron levels, which we will do today. If deficient, we will treat. If not, we will consider sleep consult, or try medication to treat Restless Leg Syndrome such as Neurontin (gabapentin), or Restoril (temazepam) or Requip (ropinirole).     Treatment Plan:    Continue Inderal (propranolol) 60 mg by mouth daily.      Continue Buspar (buspirone) 30 mg by mouth in AM and 30 mg by mouth in PM.     Continue Wellbutrin (buproprion)  mg by mouth in AM.     Continue trazodone 400 mg by mouth at bedtime.     Continue Prozac (fluoxetine) 40 mg by mouth daily.    Continue all other medications as reviewed per electronic medical record today.     All questions addressed. Education and counseling completed regarding risks and benefits of medications and  psychotherapy options.    Safety plan was reviewed. To the Emergency Department as needed or call after hours crisis line at [phone] or [phone].     To schedule individual or family therapy, call [facility] at [phone].     Schedule an appointment with me as needed. Call [facility] at [phone] to schedule.  Thank you for our work together in the Psychiatry Collaborative Care Model at [facility]. This is our last visit and I am returning your care back to your Primary Care Provider Jesenia Weinberg MD . If you are not doing well, please contact your Primary Care Provider office.     Follow up with primary care provider as planned or for acute medical concerns.    Call the psychiatric nurse line with medication questions or concerns at [phone].    My Practice Policy was reviewed and signed: YES     MyChart may be used to communicate with your provider, but this is not intended to be used for emergencies.    Serotonin syndrome symptoms usually occur within several hours of taking a new drug or increasing the dose of a drug you're already taking.   Signs and symptoms include:  Agitation or restlessness  Confusion  Rapid heart rate and high blood pressure  Dilated pupils  Loss of muscle coordination or twitching muscles  Muscle rigidity  Heavy sweating  Diarrhea  Headache  Shivering  Goose bumps    Severe serotonin syndrome can be life-threatening. Signs and symptoms include:  High fever  Seizures  Irregular heartbeat  Unconsciousness    If you suspect you might have serotonin syndrome after starting a new drug or increasing the dose of a drug you're already taking, call your doctor right away or go to the emergency room. If you have severe or rapidly worsening symptoms, seek emergency treatment immediately.    Administrative Billing:   Time spent with patient was 30 minutes and greater than 50% of time or 20 minutes was spent in counseling and  coordination of care.    Patient Status:  The patient is being returned to the referring provider for ongoing care and medication prescribing.  The patient can be referred back to this service for further consultation as needed.    Signed:   Ivon Tripp, PhD, APRN, CNP   Psychiatry

## 2017-04-26 NOTE — PROGRESS NOTES
"    Outpatient Psychiatric Progress Note    Name: Meeta Rowell   : 1970                    Primary Care Provider: Jesenia Weinberg MD - last visit 2016  Therapist: None - last visit     PHQ-9 scores:  PHQ-9 SCORE 2016   Total Score - - -   Total Score MyChart - - -   Total Score 15 18 9       JANIS-7 scores:  JANIS-7 SCORE 2016   Total Score - - -   Total Score - - -   Total Score 6 9 3       Patient Identification:  Patient is a 46 year old year old,   White American female  who presents for return visit with me.  Patient is currently employed part time. Patient attended the session alone. Patient prefers to be called: \"Meeta\"    Interim History:  I last saw Meeta Rowell for outpatient psychiatry Return Visit on ***. During that appointment, we *** .   Current medications include:   Current Outpatient Prescriptions   Medication Sig     FLUoxetine (PROZAC) 40 MG capsule Take 1 capsule (40 mg) by mouth daily     busPIRone 30 MG TABS Take 30 mg by mouth 2 times daily Increased dose.     propranolol (INDERAL LA) 60 MG 24 hr capsule Take 1 capsule (60 mg) by mouth daily     buPROPion 450 MG TB24 Take 450 mg by mouth every morning Fill at Patient request. Can substitute one 150 mg and one 300 mg if needed.     levothyroxine (SYNTHROID, LEVOTHROID) 200 MCG tablet Take 1 tablet (200 mcg) by mouth daily     traZODone (DESYREL) 100 MG tablet Take 1-4 tablets (100-400 mg) by mouth At Bedtime     drospirenone-ethinyl estradiol (KANA 28) 3-0.03 MG per tablet Take 1 tablet by mouth daily Take active pills daily     inFLIXimab (REMICADE) 100 MG injection Inject 3 mg/kg into the vein     LANsoprazole (PREVACID) 30 MG capsule Take 30 mg by mouth daily      No current facility-administered medications for this visit.        The [place] Prescription Monitoring Program has been reviewed and there are no concerns about diversionary activity for controlled " "substances at this time.      I was able to review most recent Primary Care Provider, specialty provider, and therapy visit notes that I have access to.     Meeta Rowell reports mood has been: \"***\"  Anxiety has been: \"***\"  Sleep has been: \"***\"  PHQ9 and GAD7 scores were reviewed today.   Medication side effects: {[facility]:172217}  Current stressors include: {[facility] STRESS:043188}  Coping mechanisms and supports include: {[facility] COPE:967139}    Past Medical History:   Diagnosis Date     Cancer (H) 12/11/15    thyroid     Crohn's disease (H)      Depression      Endometriosis      GERD (gastroesophageal reflux disease)      Personal history of other genital system and obstetric disorders(V13.29)      Regional enteritis of small intestine (H) 2003    ileal disease     Thyroid disease     hypo      has a past medical history of Cancer (H) (12/11/15); Crohn's disease (H); Depression; Endometriosis; GERD (gastroesophageal reflux disease); Personal history of other genital system and obstetric disorders(V13.29); Regional enteritis of small intestine (H) (2003); and Thyroid disease. She also has no past medical history of Chronic infection; Coagulation disorder (H); Malignant hyperthermia; PONV (postoperative nausea and vomiting); Sleep apnea; or Thrombosis of leg.    Social History:  Current Living situation:  {[facility] TOWN:[facility]} with {[facility] FAMILY:317725} . Feels safe at home.  Employment: {[facility] JOB:894333}   Current use of drugs or alcohol: {[facility] SUBSTANCES:116639::\"Denies\"}   Tobacco use: {[facility] YES/NO:629708}  Caffeine: {[facility] YES/NO CAFFEINE:647459}  Recovery Programming Involvement: {[facility] RECOVERY GROUPS:356638::\"Not Applicable\"}    Vital Signs:   There were no vitals taken for this visit.    Labs:  Most recent laboratory results reviewed and the pertinent results include: ***  Most recent laboratory results reviewed and no new labs ***    Review of Systems:  10 systems (general, cardiovascular, respiratory, " eyes, ENT, endocrine, GI, , M/S, neurological) were reviewed. Most pertinent finding(s) is/are: *** . The remaining systems are all unremarkable.    Mental Status Examination:  Appearance:  {[facility] APPEAR[facility]:522431}  Attitude:  { :734414}   Eye Contact:  {[facility] EYE:300893}  Gait and Station: {[facility] GAIT:722508}  Psychomotor Behavior:  { :686546}  Oriented to:  { :649782}  Attention Span and Concentration:  {:035581}  Speech:   {[facility] SPEECH:680803}  Language: { :572897}  Mood:  { :533955}  Affect:  { :956545}  Associations:  { :979241}  Thought Process:  { :999469}  Thought Content:  {[facility] TC:320439}  Recent and Remote Memory:  { :895466} Not formally assessed.  Fund of Knowledge: { :147848}  Insight:  { :916986}  Judgment:  { :665994}  Impulse Control:  { :518107}    Suicide Risk Assessment:  Today Meeta Rowell reports ***. In addition, there are notable risk factors for self-harm, including {[facility] SUICIDE RISKS:997606}. However, risk is mitigated by {[facility] SUICIDE PROTECT:635780}. Therefore, based on all available evidence including the factors cited above, Meeta Rowell does not appear to be at imminent risk for self-harm, does not meet criteria for a 72-hr hold, and therefore remains appropriate for ongoing outpatient level of care.  A thorough assessment of risk factors related to suicide and self-harm have been reviewed and are noted above. The patient convincingly denies suicidality on several occasions. There was no deceit detected, and the patient presented in a manner that was believable.     DSM5 Diagnosis:  {[facility] DSM5 DX LIST:256247}    Medical comorbidities include:   Patient Active Problem List    Diagnosis Date Noted     Generalized anxiety disorder 11/11/2016     Priority: Medium     Papillary carcinoma in situ 12/29/2015     Priority: Medium     Four foci on thyroid pathology, has endo appointment scheduled       Postsurgical hypothyroidism 12/28/2015     Priority: Medium     Thyroid  cancer (H) 12/11/2015     Priority: Medium     Regional enteritis (H) 10/14/2015     Priority: Medium     Overview:   Followed by [facility], Remicade since 2003       Obesity 10/08/2013     Priority: Medium     Impaired fasting glucose 07/27/2010     Priority: Medium     CARDIOVASCULAR SCREENING; LDL GOAL LESS THAN 160 02/10/2010     Priority: Medium     Moderate recurrent major depression (H) 07/14/2009     Priority: Medium     Major depressive disorder, recurrent episode, moderate (H) 07/14/2009     Priority: Medium     Regional enteritis of small intestine 04/08/2003     Priority: Medium     Crohns disease, well controlled on remicade.  Follows with [facility].         Psychosocial & Contextual Factors:  {[facility] PSYCHOSOCIAL FACTORS:617513}    Assessment:  Meeta Rowell reports ***. Medication side effects and alternatives were reviewed. Health promotion activities recommended and reviewed today.     Treatment Plan:    *** Continue    *** Discontinue    *** Start    *** per primary care provider.     Continue all other medications as reviewed per electronic medical record today.     All questions addressed. Education and counseling completed regarding risks and benefits of medications and psychotherapy options.    Safety plan was reviewed. To the Emergency Department as needed or call after hours crisis line at [phone] or [phone].     To schedule individual or family therapy, call [facility] at [phone]. ***    Continue individual/group therapy as planned. ***    Schedule an appointment with me in *** or sooner as needed. Call [facility] at [phone] to schedule.    Follow up with primary care provider as planned or for acute medical concerns.    Call the psychiatric nurse line with medication questions or concerns at [phone].    My Practice Policy was reviewed and signed: YES     MyChart may be used to communicate with your provider, but this is not intended  to be used for emergencies.    Administrative Billing:   Time spent with patient was 30 minutes and greater than 50% of time or 20 minutes was spent in counseling and coordination of care.    Patient Status:  {KEITHSTATUS:953053}    Signed:   Ivon Tripp, PhD, APRN, CNP   Psychiatry

## 2017-04-26 NOTE — NURSING NOTE
"Chief Complaint   Patient presents with     RECHECK     doing well medication        Initial /60 (BP Location: Right arm, Patient Position: Chair, Cuff Size: Adult Large)  Pulse 81  Temp 98.2  F (36.8  C) (Oral)  Wt 204 lb (92.5 kg)  SpO2 97%  BMI 38.55 kg/m2 Estimated body mass index is 38.55 kg/(m^2) as calculated from the following:    Height as of 1/6/17: 5' 1\" (1.549 m).    Weight as of this encounter: 204 lb (92.5 kg).  Medication Reconciliation: complete    "

## 2017-04-27 LAB
FERRITIN SERPL-MCNC: 10 NG/ML (ref 8–252)
IRON SATN MFR SERPL: 7 % (ref 15–46)
IRON SERPL-MCNC: 34 UG/DL (ref 35–180)
TIBC SERPL-MCNC: 489 UG/DL (ref 240–430)

## 2017-04-27 ASSESSMENT — PATIENT HEALTH QUESTIONNAIRE - PHQ9: SUM OF ALL RESPONSES TO PHQ QUESTIONS 1-9: 8

## 2017-04-27 ASSESSMENT — ANXIETY QUESTIONNAIRES: GAD7 TOTAL SCORE: 1

## 2017-05-24 DIAGNOSIS — E89.0 S/P TOTAL THYROIDECTOMY: ICD-10-CM

## 2017-05-24 DIAGNOSIS — C73 THYROID CANCER (H): ICD-10-CM

## 2017-05-26 RX ORDER — LEVOTHYROXINE SODIUM 200 UG/1
TABLET ORAL
Qty: 90 TABLET | Refills: 1 | Status: SHIPPED | OUTPATIENT
Start: 2017-05-26 | End: 2017-11-30

## 2017-05-26 NOTE — TELEPHONE ENCOUNTER
levothyroxine     Last Written Prescription Date: 11/11/2016  Last Quantity: 90, # refills: 1  [facility] or [facility] prescribing provider: 11/11/2016   Next 5 appointments (look out 90 days)     Antwan 15, 2017  2:00 PM CDT   Return Visit with Alice Donis MD   [facility] ([facility]    [address]  [facility] [id number] [phone]                   TSH   Date Value Ref Range Status   12/15/2016 0.62 0.40 - 4.00 mU/L Final     Prescription approved per [facility] Refill Protocol.    Sabrina Ogden RN

## 2017-06-15 ENCOUNTER — OFFICE VISIT (OUTPATIENT)
Dept: ENDOCRINOLOGY | Facility: CLINIC | Age: 47
End: 2017-06-15
Payer: COMMERCIAL

## 2017-06-15 VITALS
SYSTOLIC BLOOD PRESSURE: 110 MMHG | WEIGHT: 202.7 LBS | HEIGHT: 61 IN | DIASTOLIC BLOOD PRESSURE: 70 MMHG | OXYGEN SATURATION: 96 % | HEART RATE: 74 BPM | TEMPERATURE: 97.9 F | BODY MASS INDEX: 38.27 KG/M2

## 2017-06-15 DIAGNOSIS — E89.0 POSTSURGICAL HYPOTHYROIDISM: ICD-10-CM

## 2017-06-15 DIAGNOSIS — C73 THYROID CANCER (H): Primary | ICD-10-CM

## 2017-06-15 PROCEDURE — 84439 ASSAY OF FREE THYROXINE: CPT | Performed by: INTERNAL MEDICINE

## 2017-06-15 PROCEDURE — 84443 ASSAY THYROID STIM HORMONE: CPT | Performed by: INTERNAL MEDICINE

## 2017-06-15 PROCEDURE — 36415 COLL VENOUS BLD VENIPUNCTURE: CPT | Performed by: INTERNAL MEDICINE

## 2017-06-15 PROCEDURE — 99214 OFFICE O/P EST MOD 30 MIN: CPT | Performed by: INTERNAL MEDICINE

## 2017-06-15 NOTE — PATIENT INSTRUCTIONS
[facility] & [place] locations   Dr Donis, Endocrinology Department      [facility]   [address] [place] [address]  Appointment [phone]  Fax: [phone]  [place]: Monday and Tuesday         [facility] [address] [place] [address]  Appointment [phone]  Fax: [phone]  [place]: Wednesday and Thursday          Labs today  Dose change based on labs  Whole body scan with radiology    Whole Body Radioiodine Scan - Thyrogen 2 Dose: PATIÑO I 131    Start low iodine diet 2 weeks to 10 days before procedure    Day #1 Thyrogen 0.9 mg IM  __________    Day #2 Thyrogen 0.9 mg IM  __________    Day #3   - Lab work ( make a lab appointment)- TSH, thyroglobulin, CBC  - whole body scan in afternoon    Thyrogen injection and Radioactive iodine treatment will be done at [facility]. (Radiology)     [facility] radiology scheduleing  [phone]       Please call and schedule the recommended test as above protocol as discussed in clinic visit.    Restrictions:  Foods to Avoid 10 days prior to test:  - iodized salt  - fish and sea food  - white bread  - cheese  - mayonasise  - cheese cake  - kelp/sea weed  - naan bread  - yorkshire pudding       Take Levothyroxine on an empty stomach. Take it with a full glass of water at least 30 minutes to 1 hour before eating breakfast.   This medicine should be taken at least 4 hours before or 4 hours after these medicines: antacids (Maalox , Mylanta , Tums ), calcium supplements, cholestyramine (Prevalite , Questran ), colestipol (Colestid ), iron supplements, orlistat (Arjun , Xenical ), simethicone (Gas-X , Mylicon ), and sucralfate (Carafate ).   Swallow the capsule whole. Do not cut or crush it.

## 2017-06-15 NOTE — NURSING NOTE
"Chief Complaint   Patient presents with     RECHECK     Thyroid Problem       Initial /70 (BP Location: Left arm, Patient Position: Chair, Cuff Size: Adult Large)  Pulse 74  Temp 97.9  F (36.6  C) (Oral)  Ht 5' 1\" (1.549 m)  Wt 202 lb 11.2 oz (91.9 kg)  SpO2 96%  Breastfeeding? No  BMI 38.3 kg/m2 Estimated body mass index is 38.3 kg/(m^2) as calculated from the following:    Height as of this encounter: 5' 1\" (1.549 m).    Weight as of this encounter: 202 lb 11.2 oz (91.9 kg).  Medication Reconciliation: complete    "

## 2017-06-15 NOTE — MR AVS SNAPSHOT
After Visit Summary   6/15/2017    Meeta Rowell    MRN: [id number]           Patient Information     Date Of Birth          1970        Visit Information        Provider Department      6/15/2017 2:00 PM Alice Donis MD [facility]        Today's Diagnoses     Thyroid cancer (H)    -  1    Postsurgical hypothyroidism        Morbid obesity (H)          Care Instructions    [facility] & [place] locations   Dr Donis, Endocrinology Department      [facility]   [address] [place] [address]  Appointment [phone]  Fax: [phone]  [place]: Monday and Tuesday         [facility]   [address] [facility] [place] [address]  Appointment [phone]  Fax: [phone]  [place]: Wednesday and Thursday          Labs today  Dose change based on labs  Whole body scan with radiology    Whole Body Radioiodine Scan - Thyrogen 2 Dose: PATIÑO I 131    Start low iodine diet 2 weeks to 10 days before procedure    Day #1 Thyrogen 0.9 mg IM  __________    Day #2 Thyrogen 0.9 mg IM  __________    Day #3   - Lab work ( make a lab appointment)- TSH, thyroglobulin, CBC  - whole body scan in afternoon    Thyrogen injection and Radioactive iodine treatment will be done at [facility]. (Radiology)     [facility] radiology scheduleing  [phone]       Please call and schedule the recommended test as above protocol as discussed in clinic visit.    Restrictions:  Foods to Avoid 10 days prior to test:  - iodized salt  - fish and sea food  - white bread  - cheese  - mayonasise  - cheese cake  - kelp/sea weed  - naan bread  - yorkshire pudding       Take Levothyroxine on an empty stomach. Take it with a full glass of water at least 30 minutes to 1 hour before eating breakfast.   This medicine should be taken at least 4 hours before or 4 hours after these medicines: antacids  "(Maalox , Mylanta , Tums ), calcium supplements, cholestyramine (Prevalite , Questran ), colestipol (Colestid ), iron supplements, orlistat (Arjun , Xenical ), simethicone (Gas-X , Mylicon ), and sucralfate (Carafate ).   Swallow the capsule whole. Do not cut or crush it.             Follow-ups after your visit        Who to contact     If you have questions or need follow up information about today's clinic visit or your schedule please contact [facility] directly at [phone].  Normal or non-critical lab and imaging results will be communicated to you by [employer], letter or phone within 4 business days after the clinic has received the results. If you do not hear from us within 7 days, please contact the clinic through [employer] or phone. If you have a critical or abnormal lab result, we will notify you by phone as soon as possible.  Submit refill requests through [employer] or call your pharmacy and they will forward the refill request to us. Please allow 3 business days for your refill to be completed.          Additional Information About Your Visit        [employer] Of The Month Information     [employer] gives you secure access to your electronic health record. If you see a primary care provider, you can also send messages to your care team and make appointments. If you have questions, please call your primary care clinic.  If you do not have a primary care provider, please call [phone] and they will assist you.        Care EveryWhere ID     This is your Care EveryWhere ID. This could be used by other organizations to access your [facility] medical records  [id number]        Your Vitals Were     Pulse Temperature Height Pulse Oximetry Breastfeeding? BMI (Body Mass Index)    74 97.9  F (36.6  C) (Oral) 1.549 m (5' 1\") 96% No 38.3 kg/m2       Blood Pressure from Last 3 Encounters:   06/15/17 110/70   04/26/17 110/60   01/06/17 106/68    Weight from Last 3 Encounters:   06/15/17 91.9 kg (202 lb 11.2 oz) "   04/26/17 92.5 kg (204 lb)   01/06/17 92.5 kg (204 lb)              We Performed the Following     T4 free     TSH        Primary Care Provider Office Phone # Fax #    Jesenia Weinberg [phone]       [address] [place] [address]        Thank you!     Thank you for choosing [facility]  for your care. Our goal is always to provide you with excellent care. Hearing back from our patients is one way we can continue to improve our services. Please take a few minutes to complete the written survey that you may receive in the mail after your visit with us. Thank you!             Your Updated Medication List - Protect others around you: Learn how to safely use, store and throw away your medicines at www.disposemymeds.org.          This list is accurate as of: 6/15/17  2:24 PM.  Always use your most recent med list.                   Brand Name Dispense Instructions for use    BuPROPion HCl ER (XL) 450 MG Tb24     90 tablet    Take 450 mg by mouth every morning Fill at Patient request. Can substitute one 150 mg and one 300 mg if needed.       BusPIRone HCl 30 MG Tabs     180 tablet    Take 30 mg by mouth 2 times daily       drospirenone-ethinyl estradiol 3-0.03 MG per tablet    KANA 28    112 tablet    Take 1 tablet by mouth daily Take active pills daily       FLUoxetine 40 MG capsule    PROzac    90 capsule    Take 1 capsule (40 mg) by mouth daily       LANsoprazole 30 MG CR capsule    PREVACID     Take 30 mg by mouth daily       levothyroxine 200 MCG tablet    SYNTHROID/LEVOTHROID    90 tablet    TAKE ONE TABLET BY MOUTH EVERY DAY       propranolol 60 MG 24 hr capsule    INDERAL LA    90 capsule    Take 1 capsule (60 mg) by mouth daily       REMICADE 100 MG injection   Generic drug:  inFLIXimab      Inject 3 mg/kg into the vein       traZODone 100 MG tablet    DESYREL    120 tablet    Take 1-4 tablets (100-400 mg) by mouth At Bedtime

## 2017-06-15 NOTE — PROGRESS NOTES
"Name: Meeta Rowell  F/u for postsurgical hypothyroidism and PTC. IRINA  and Nancy Garcia.  HPI:  Meeta Rowell is a 46 year old female with:    1.  Thyroid cancer:  She initially presented for the evaluation of thyroid nodule, first noted in 2013.  She was previously seen for this by Dr. Berry and recalls FNA biopsy which was \"benign\".  Then  she noted enlarged neck lymph nodes.  Repeat FNA biopsy of the 3.0 cm right thyroid nodule showed follicular lesion of undetermined significance. She underwent total thyroidectomy 12/11/2015 after Frozen section confirmed a follicular lesion and a small papillary carcinoma of the thyroid .  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 No abnormal activity to suggest  metastatic thyroid cancer.  Follow-up neck ultrasound 7/2016: No enlarged or abnormal-appearing lymph nodes are appreciated in the right or left neck.  TG ( suppressed ) : 1.3 ( 1/27/16)  TG ( stimulated ) : 2.7 ( 2/3/16)    Postsurgical hypothyroidism:  She was started on Levothyroxine 150 mcg after surgery, dose was increased to 175, then increased to currently dose 200 mcg qd.  S/p menopause.   Taking generic levothyroxine.  Due for labs.  Goal TSH 0.3-2.  Wt stable.  Wt Readings from Last 2 Encounters:   06/15/17 91.9 kg (202 lb 11.2 oz)   04/26/17 92.5 kg (204 lb)     Family history is positive for mother with hypothyroidism.  No family history of thyroid cancer.  No personal history of radiation exposure to the head or neck.  She has a history of Chron's disease and has been treated with " Remicade for many years.  She is a nurse.        PMH/PSH:  Past Medical History:   Diagnosis Date     Cancer (H) 12/11/15    thyroid     Crohn's disease (H)      Depression      Endometriosis      GERD (gastroesophageal reflux disease)      Personal history of other genital system and obstetric disorders(V13.29)      Regional enteritis of small intestine (H)     ileal disease     Thyroid disease     hypo     Past Surgical History:   Procedure Laterality Date     C NONSPECIFIC PROCEDURE      s/p cholecystectomy     CHOLECYSTECTOMY       ENT SURGERY  2015    total thyroidectomy     THYROIDECTOMY N/A 2015    Procedure: THYROIDECTOMY;  Surgeon: Shari King MD;  Location: [facility] OR     Family Hx:  Family History   Problem Relation Age of Onset     Hypertension Mother      Cervical Cancer Mother      GASTROINTESTINAL DISEASE Mother      diverticulitis     Cardiovascular Mother      Hypothyroidism Mother      Coronary Artery Disease Mother      Hypertension Father      DIABETES Paternal Grandmother      CEREBROVASCULAR DISEASE Paternal Grandmother      Thyroid disease: Yes, mother         DM2: No         Autoimmune: DM1, SLE, RA, Vitiligo:  No    Social Hx:  Social History     Social History     Marital status:      Spouse name: N/A     Number of children: 2     Years of education: N/A     Occupational History     nurse [employer]     cardiology nurse     Social History Main Topics     Smoking status: Never Smoker     Smokeless tobacco: Never Used     Alcohol use 0.0 oz/week     0 Standard drinks or equivalent per week      Comment: once or twice a month     Drug use: No     Sexual activity: Yes     Partners: Male     Birth control/ protection: Surgical, Pill      Comment: Vasectomy     Other Topics Concern     Not on file     Social History Narrative    Older son, Atul,  , with developmental delay          MEDICATIONS:  has a current medication list which includes the  "following prescription(s): levothyroxine, fluoxetine, propranolol, buspirone hcl, bupropion hcl er (xl), trazodone, drospirenone-ethinyl estradiol, infliximab, and lansoprazole.    Review of Systems  10 point ROS neg other than the symptoms noted above in the HPI.    Physical Exam   VS: /70 (BP Location: Left arm, Patient Position: Chair, Cuff Size: Adult Large)  Pulse 74  Temp 97.9  F (36.6  C) (Oral)  Ht 1.549 m (5' 1\")  Wt 91.9 kg (202 lb 11.2 oz)  SpO2 96%  Breastfeeding? No  BMI 38.3 kg/m2  GENERAL: NAD, well dressed, answering questions appropriately, appears stated age.  HEENT: OP clear, no exophthalmos, no proptosis, EOMI, no lig lag, no retraction, no scleral icterus  NECK:  Supply, thyroid bed palpably empty, no lymphadenopathy, well healed horizontal scar across the lower neck.  RESPIRATORY: Normal respiratory effort.  CARDIOVASCULAR: No peripheral edema.  PSYCH: Intact judgment and insight. A&OX3 with a cordial affect.    LABS:  TG:  TG ( suppressed ) : 1.3 ( 1/27/16), TSH 7.83  TG ( stimulated ) : 2.7 ( 2/3/16), TSH 1.28    6/2016:  TSH: 1.28  JUAN: <0.4  TG: <0.1    12/2016:  TSH: 0.62  JUAN: <0.4  TG: <0.1    TFTs:  ENDO THYROID LABS-[facility] Latest Ref Rng 6/28/2016   TSH 0.40 - 4.00 mU/L 1.28   T4 FREE 0.76 - 1.46 ng/dL 1.13     !THYROID Latest Ref Rng 4/25/2016 1/27/2016 10/14/2015 9/1/2015   TSH 0.40 - 4.00 mU/L 3.74 7.83 (H) 3.79 4.77 (H)   T4 FREE 0.76 - 1.46 ng/dL 1.07 1.10 0.94 0.95     !THYROID Latest Ref Rng 5/22/2015   TSH 0.40 - 4.00 mU/L 5.92 (H)   T4 FREE 0.76 - 1.46 ng/dL 1.52 (H)       FINAL DIAGNOSIS:  A, B, and C.  Specimens: Right thyroid lobe, prelaryngeal tissue, left thyroid  lobe.  Procedure: Total thyroidectomy.  Received: Fresh.  Specimen Integrity: Divided (thyroidectomy performed as  lobectomy and completion thyroidectomy).  Tumor Focality: Four (4) foci of microcarcinomas .  Tumor Laterality: Right lobe (x2) and left lobe (x2).    Tumor Histologic Type (all tumors): " Papillary microcarcinoma;  follicular variant, infiltrative.    Tumor Size:  Tumor #1 (right lobe): 0.8 cm.  Tumor #2 (right lobe): 0.6 cm.  Tumor #3 (left lobe): 0.2 cm.  Tumor #4 (left lobe): 0.25 cm.    Margins:  Tumor #1: Involved right anterolateral margin over an area of 0.4  cm.  Tumor #2: Tumor within 0.1 cm of the nearest (right anterolateral)  margin.  Tumor #3: Tumor within fraction of a mm of the left anterolateral  and medial margins.  Tumor #4: Tumor within fraction of a millimeter of the left  anterolateral margin.    Regional Lymph Nodes: No nodes submitted or found.    Pathologic Staging (pTNM): pT1a(m), pNX, pM not applicable.    Additional Pathologic Findings:  -Hyperplastic nodules (largest 2 cm wide; within right lobe).  -Single normal-appearing parathyroid gland identified within left  lobe, mid region.  -Prelaryngeal tissue (specimen C) comprises of benign fibrovascular  tissue without lymph nodes.  Prior biopsy site changes.      NM I-131 THYROID THERAPY  2/3/2016 9:38 AM     HISTORY: Thyroid cancer.     TECHNIQUE: Patient was given 100 mCi I-131orally and instructed in  home care.         IMPRESSION: Successful I-131 therapy administration.       NUCLEAR MEDICINE I-131 WHOLE BODY SCAN February 10, 2016 8:08 AM       HISTORY: Postprocedural hypothyroidism. Hypocalcemia. Malignant  neoplasm of thyroid gland,       COMPARISON: None.     TECHNIQUE: Patient was given I-131 orally prior to the scan followed  by whole body scan.       DOSE: Seven days previously the patient received 106.6 mCi I-131.     FINDINGS: Physiologic activity noted. No abnormal activity to suggest  metastatic thyroid cancer.           IMPRESSION: No metastatic disease demonstrated.    Follow up Neck US 7/2016:  HEAD AND NECK SOFT TISSUE ULTRASOUND  7/18/2016  2:44 PM       HISTORY: Postprocedural hypothyroidism, Malignant neoplasm of thyroid  gland       FINDINGS: Anterior neck ultrasound shows no residual thyroid  tissue.  Multiple bilateral normal-appearing lymph nodes are present. No  enlarged or abnormal-appearing lymph nodes are evident in the right or  left neck.                                                                       IMPRESSION: No enlarged or abnormal-appearing lymph nodes are  appreciated in the right or left neck.    All pertinent notes, labs, and images personally reviewed by me.     A/P  Ms.Heather SERGE Rowell is a 45 year old here for the evaluation of:    1.  PTC:   FNA biopsy of the 3.0 cm right thyroid nodule showed follicular lesion of undetermined significance. 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 No abnormal activity to suggest  metastatic thyroid cancer.  Follow up US 6/2016- no mets  -- TG appear stable  -- get follow up [facility]BS    #2. Postsurgical hypothyroid.  Currently treated with Levothyroxine 200 mcg daily. Generic.   Clinically looks euthyroid.  Recheck today.  Goal TSH 0.3-2.      Follow-up:  After [facility]BS    Alice Donis MD  Endocrinology   [facility]    Addendum to above note and clinic visit:    Labs reviewed.    See result note/telephone encounter.

## 2017-06-16 LAB
T4 FREE SERPL-MCNC: 1.34 NG/DL (ref 0.76–1.46)
TSH SERPL DL<=0.05 MIU/L-ACNC: 0.27 MU/L (ref 0.4–4)

## 2017-06-21 NOTE — PROGRESS NOTES
Labs noted. Will discuss in next clinic visit. Thyroid labs in acceptable range. H/o thyroid cancer.  Continue current dose of levothyroxine.  Please call patient with above information.  If she needs new Rx let me know.    Alice Donis MD  Endocrinology   [facility]  June 21, 2017

## 2017-06-23 ENCOUNTER — TRANSFERRED RECORDS (OUTPATIENT)
Dept: HEALTH INFORMATION MANAGEMENT | Facility: CLINIC | Age: 47
End: 2017-06-23

## 2017-07-10 ENCOUNTER — MYC REFILL (OUTPATIENT)
Dept: PSYCHIATRY | Facility: CLINIC | Age: 47
End: 2017-07-10

## 2017-07-10 DIAGNOSIS — F41.1 GENERALIZED ANXIETY DISORDER: ICD-10-CM

## 2017-07-12 RX ORDER — PROPRANOLOL HCL 60 MG
60 CAPSULE, EXTENDED RELEASE 24HR ORAL DAILY
Qty: 90 CAPSULE | Refills: 3 | Status: SHIPPED | OUTPATIENT
Start: 2017-07-12 | End: 2018-07-22

## 2017-07-12 NOTE — TELEPHONE ENCOUNTER
Pt last visit with Ivon Tripp, PhD, APRN, CNP 4/26/17 and was returned back to PCP at that time.  Will route to PCP.    Last OV with Ivon Tripp, PhD, APRN, CNP  Treatment Plan:    Continue Inderal (propranolol) 60 mg by mouth daily.      Continue Buspar (buspirone) 30 mg by mouth in AM and 30 mg by mouth in PM.     Continue Wellbutrin (buproprion)  mg by mouth in AM.     Continue trazodone 400 mg by mouth at bedtime.     Continue Prozac (fluoxetine) 40 mg by mouth daily.    Continue all other medications as reviewed per electronic medical record today.     All questions addressed. Education and counseling completed regarding risks and benefits of medications and psychotherapy options.    Safety plan was reviewed. To the Emergency Department as needed or call after hours crisis line at [phone] or [phone].     To schedule individual or family therapy, call [facility] at [phone].     Schedule an appointment with me as needed. Call [facility] at [phone] to schedule.    Thank you for our work together in the Psychiatry Collaborative Care Model at [facility]. This is our last visit and I am returning your care back to your Primary Care Provider Jesenia Weinberg MD . If you are not doing well, please contact your Primary Care Provider office.     Follow up with primary care provider as planned or for acute medical concerns.    Call the psychiatric nurse line with medication questions or concerns at [phone].    My Practice Policy was reviewed and signed: YES     MyChart may be used to communicate with your provider, but this is not intended to be used for emergencies.

## 2017-10-11 ENCOUNTER — TELEPHONE (OUTPATIENT)
Dept: PEDIATRICS | Facility: CLINIC | Age: 47
End: 2017-10-11

## 2017-10-11 NOTE — TELEPHONE ENCOUNTER
Panel Management Review      Patient has the following on her problem list:     Depression / Dysthymia review    Measure:  Needs PHQ-9 score of 4 or less during index window.  Administer PHQ-9 and if score is 5 or more, send encounter to provider for next steps.    5 - 7 month window range:     PHQ-9 SCORE 12/5/2016 1/6/2017 4/26/2017   Total Score - - -   Total Score MyChart - - -   Total Score 18 9 8       If PHQ-9 recheck is 5 or more, route to provider for next steps.    Patient is due for:  PHQ9 and DAP        Composite cancer screening  Chart review shows that this patient is due/due soon for the following None  Summary:    Patient is due/failing the following:   A1C, DAP, LDL and PHQ9    Action needed:   Patient needs to do PHQ9. and Patient needs fasting lab only appointment    Type of outreach:    Sent [employer] message.    Questions for provider review:    None                                                                                                                                    Ana Maria Ling MA        Chart routed to self .

## 2017-11-15 NOTE — TELEPHONE ENCOUNTER
Panel Management Review      Patient has the following on her problem list:     Depression / Dysthymia review    Measure:  Needs PHQ-9 score of 4 or less during index window.  Administer PHQ-9 and if score is 5 or more, send encounter to provider for next steps.    5 - 7 month window range:     PHQ-9 SCORE 12/5/2016 1/6/2017 4/26/2017   Total Score - - -   Total Score MyChart - - -   Total Score 18 9 8       If PHQ-9 recheck is 5 or more, route to provider for next steps.    Patient is due for:  PHQ9 and DAP        Composite cancer screening  Chart review shows that this patient is due/due soon for the following None  Summary:    Patient is due/failing the following:   A1C, DAP, LDL, PHQ9 and PHYSICAL    Action needed:   Patient needs office visit for Physical., Patient needs to do PHQ9. and Patient needs fasting lab only appointment    Type of outreach:    Phone, left message for patient to call back.     Questions for provider review:    None                                                                                                                                    Ana Maria Ling MA       Chart routed to self .

## 2017-11-17 ENCOUNTER — MYC MEDICAL ADVICE (OUTPATIENT)
Dept: PEDIATRICS | Facility: CLINIC | Age: 47
End: 2017-11-17

## 2017-11-17 DIAGNOSIS — F33.1 MODERATE RECURRENT MAJOR DEPRESSION (H): ICD-10-CM

## 2017-11-17 RX ORDER — BUPROPION HYDROCHLORIDE 450 MG/1
450 TABLET, FILM COATED, EXTENDED RELEASE ORAL EVERY MORNING
Qty: 90 TABLET | Refills: 0 | Status: SHIPPED | OUTPATIENT
Start: 2017-11-17 | End: 2017-12-12

## 2017-11-21 ENCOUNTER — MYC MEDICAL ADVICE (OUTPATIENT)
Dept: PEDIATRICS | Facility: CLINIC | Age: 47
End: 2017-11-21

## 2017-11-21 DIAGNOSIS — F33.1 MODERATE RECURRENT MAJOR DEPRESSION (H): ICD-10-CM

## 2017-11-21 NOTE — TELEPHONE ENCOUNTER
Patient reporting 11/17/17 Bupropion rx was not received. Called pharmacy, it was received and picked up by patient today.     Updated patient via [email or web address] message.

## 2017-11-24 ENCOUNTER — TRANSFERRED RECORDS (OUTPATIENT)
Dept: HEALTH INFORMATION MANAGEMENT | Facility: CLINIC | Age: 47
End: 2017-11-24

## 2017-11-28 DIAGNOSIS — C73 THYROID CANCER (H): ICD-10-CM

## 2017-11-28 DIAGNOSIS — E89.0 S/P TOTAL THYROIDECTOMY: ICD-10-CM

## 2017-11-30 ENCOUNTER — MYC REFILL (OUTPATIENT)
Dept: PEDIATRICS | Facility: CLINIC | Age: 47
End: 2017-11-30

## 2017-11-30 DIAGNOSIS — G47.00 INSOMNIA: ICD-10-CM

## 2017-11-30 DIAGNOSIS — C73 THYROID CANCER (H): ICD-10-CM

## 2017-11-30 DIAGNOSIS — E89.0 S/P TOTAL THYROIDECTOMY: ICD-10-CM

## 2017-11-30 RX ORDER — TRAZODONE HYDROCHLORIDE 100 MG/1
100-400 TABLET ORAL AT BEDTIME
Qty: 120 TABLET | Refills: 0 | Status: SHIPPED | OUTPATIENT
Start: 2017-11-30 | End: 2017-12-12

## 2017-11-30 RX ORDER — LEVOTHYROXINE SODIUM 200 UG/1
200 TABLET ORAL DAILY
Qty: 30 TABLET | Refills: 0 | Status: SHIPPED | OUTPATIENT
Start: 2017-11-30 | End: 2017-12-26

## 2017-11-30 RX ORDER — LEVOTHYROXINE SODIUM 200 UG/1
TABLET ORAL
Qty: 90 TABLET | Refills: 1 | OUTPATIENT
Start: 2017-11-30

## 2017-11-30 NOTE — TELEPHONE ENCOUNTER
This was sent through a my chart encounter today.  Sent back as duplicate.  Marla Bryan, RN  Triage Nurse

## 2017-11-30 NOTE — TELEPHONE ENCOUNTER
Prescription refilled x 1 per [facility] Refill Protocol.     Last Office Visit with [facility], P or [facility] prescribing provider: 11/11/16   Next 5 appointments (look out 90 days)     Dec 12, 2017  2:10 PM CST   PHYSICAL with Jes[facility] Weinberg MD   [facility]    [address] [place] [id number]   [phone]                 TRAZODONE     Last Written Prescription Date: 11/11/16  Last Quantity: 120, # refills: 11    LEVOTHYROXINE     Last Written Prescription Date: 5/26/17  Last Quantity: 90, # refills: 1      TSH   Date Value Ref Range Status   06/15/2017 0.27 (L) 0.40 - 4.00 mU/L Final

## 2017-11-30 NOTE — TELEPHONE ENCOUNTER
Message from Kid Bunchhart:  Original authorizing provider: MD Meeta Rodgers would like a refill of the following medications:  traZODone (DESYREL) 100 MG tablet [Jesenia Weinberg MD]  levothyroxine (SYNTHROID/LEVOTHROID) 200 MCG tablet [Jesenia Weinberg MD]    Preferred pharmacy: [facility] [address] [facility]    Comment:  I have a new appointment for Tuesday Dec 12,17. I am completely out of these medications, could you please refill for another months supply? I am sorry I had to cancel my appointment yesterday, I have a horrible cough & sore throat. Thank you.

## 2017-12-02 ENCOUNTER — RADIANT APPOINTMENT (OUTPATIENT)
Dept: GENERAL RADIOLOGY | Facility: CLINIC | Age: 47
End: 2017-12-02
Attending: FAMILY MEDICINE
Payer: COMMERCIAL

## 2017-12-02 ENCOUNTER — OFFICE VISIT (OUTPATIENT)
Dept: URGENT CARE | Facility: URGENT CARE | Age: 47
End: 2017-12-02
Payer: COMMERCIAL

## 2017-12-02 VITALS
RESPIRATION RATE: 26 BRPM | HEART RATE: 65 BPM | TEMPERATURE: 98 F | DIASTOLIC BLOOD PRESSURE: 79 MMHG | SYSTOLIC BLOOD PRESSURE: 110 MMHG | OXYGEN SATURATION: 93 %

## 2017-12-02 DIAGNOSIS — R05.9 COUGH: ICD-10-CM

## 2017-12-02 DIAGNOSIS — R05.9 COUGH: Primary | ICD-10-CM

## 2017-12-02 DIAGNOSIS — R06.02 SOB (SHORTNESS OF BREATH): ICD-10-CM

## 2017-12-02 PROCEDURE — 94640 AIRWAY INHALATION TREATMENT: CPT | Performed by: FAMILY MEDICINE

## 2017-12-02 PROCEDURE — 71020 XR CHEST 2 VW: CPT

## 2017-12-02 PROCEDURE — 99214 OFFICE O/P EST MOD 30 MIN: CPT | Mod: 25 | Performed by: FAMILY MEDICINE

## 2017-12-02 RX ORDER — ALBUTEROL SULFATE 90 UG/1
2 AEROSOL, METERED RESPIRATORY (INHALATION) EVERY 6 HOURS PRN
Qty: 1 INHALER | Refills: 0 | Status: ON HOLD | OUTPATIENT
Start: 2017-12-02 | End: 2018-08-13

## 2017-12-02 RX ORDER — CEFDINIR 300 MG/1
300 CAPSULE ORAL 2 TIMES DAILY
Qty: 20 CAPSULE | Refills: 0 | Status: SHIPPED | OUTPATIENT
Start: 2017-12-02 | End: 2017-12-12

## 2017-12-02 RX ORDER — PREDNISONE 20 MG/1
40 TABLET ORAL DAILY
Qty: 10 TABLET | Refills: 0 | Status: SHIPPED | OUTPATIENT
Start: 2017-12-02 | End: 2017-12-07

## 2017-12-02 RX ORDER — CODEINE PHOSPHATE AND GUAIFENESIN 10; 100 MG/5ML; MG/5ML
1 SOLUTION ORAL EVERY 4 HOURS PRN
Qty: 120 ML | Refills: 0 | Status: SHIPPED | OUTPATIENT
Start: 2017-12-02 | End: 2017-12-12

## 2017-12-02 NOTE — NURSING NOTE
"Chief Complaint   Patient presents with     Urgent Care     URI     Cough       Initial /79 (BP Location: Right arm, Patient Position: Chair, Cuff Size: Adult Large)  Pulse 65  Temp 98  F (36.7  C) (Oral)  Resp 26  SpO2 93% Estimated body mass index is 38.3 kg/(m^2) as calculated from the following:    Height as of 6/15/17: 5' 1\" (1.549 m).    Weight as of 6/15/17: 202 lb 11.2 oz (91.9 kg).  Medication Reconciliation: complete     Marianela Fontana CMA (AAMA)        "

## 2017-12-02 NOTE — PROGRESS NOTES
SUBJECTIVE:   Meeta Rowell is a 47 year old female who presents to clinic today for the following health issues:      RESPIRATORY SYMPTOMS      Duration: x3 days    Description  nasal congestion, rhinorrhea, cough, wheezing, fever, headache, fatigue/malaise and nausea    Severity: moderate    Accompanying signs and symptoms: Cx congestion, Cx pain due to cough, SOB    History (predisposing factors):  Son is sick with bad cold    Precipitating or alleviating factors: None    Therapies tried and outcome:  oral decongestant acetaminophen    Symptoms appear to be getting worse.     Past Medical History:   Diagnosis Date     Cancer (H) 12/11/15    thyroid     Crohn's disease (H)      Depression      Endometriosis      GERD (gastroesophageal reflux disease)      Hx-genital/obstetric disease      Regional enteritis of small intestine (H) 2003    ileal disease     Thyroid disease     hypo     No past history of lung disease            Problem list and histories reviewed & adjusted, as indicated.  Additional history:     Patient Active Problem List   Diagnosis     Regional enteritis of small intestine     Moderate recurrent major depression (H)     CARDIOVASCULAR SCREENING; LDL GOAL LESS THAN 160     Impaired fasting glucose     Obesity     Regional enteritis (H)     Major depressive disorder, recurrent episode, moderate (H)     Thyroid cancer (H)     Postsurgical hypothyroidism     Papillary carcinoma in situ     Generalized anxiety disorder     Morbid obesity (H)     Past Surgical History:   Procedure Laterality Date     C NONSPECIFIC PROCEDURE  2000    s/p cholecystectomy     CHOLECYSTECTOMY       ENT SURGERY  12/2015    total thyroidectomy     THYROIDECTOMY N/A 12/11/2015    Procedure: THYROIDECTOMY;  Surgeon: Shari King MD;  Location:  OR       Social History   Substance Use Topics     Smoking status: Never Smoker     Smokeless tobacco: Never Used     Alcohol use 0.0 oz/week     0 Standard drinks or  equivalent per week      Comment: once or twice a month     Family History   Problem Relation Age of Onset     Hypertension Mother      Cervical Cancer Mother      GASTROINTESTINAL DISEASE Mother      diverticulitis     Cardiovascular Mother      Hypothyroidism Mother      Coronary Artery Disease Mother      Hypertension Father      DIABETES Paternal Grandmother      CEREBROVASCULAR DISEASE Paternal Grandmother              Reviewed and updated as needed this visit by clinical staff     Reviewed and updated as needed this visit by Provider         ROS:  Constitutional, HEENT, cardiovascular, pulmonary, gi and gu systems are negative, except as otherwise noted.      OBJECTIVE:   /79 (BP Location: Right arm, Patient Position: Chair, Cuff Size: Adult Large)  Pulse 65  Temp 98  F (36.7  C) (Oral)  Resp 26  SpO2 93%  There is no height or weight on file to calculate BMI.  GENERAL: alert and mild distress  NECK: bilateral anterior cervical adenopathy, no asymmetry, masses, or scars and thyroid normal to palpation  RESP: coarse breath sounds bilaterally with rhonchi and wheezing  CV: regular rate and rhythm, normal S1 S2, no S3 or S4, no murmur, click or rub, no peripheral edema and peripheral pulses strong  ABDOMEN: soft, nontender, no hepatosplenomegaly, no masses and bowel sounds normal  MS: no gross musculoskeletal defects noted, no edema  NEURO: Normal strength and tone, mentation intact and speech normal    Diagnostic Test Results:  CXR -     ASSESSMENT/PLAN:       1. Cough  2. Sob  3.  Bronchospasm  4.  Bronchitis  5.  Low O2 saturation    Pulmonary symptoms over the last 3 days seem to be getting worse.  Appears to be an infection is some previously had.    We did obtain a nebulizing treatment for him for his shortness of breath wheezing and O2 sat of 93%.  This did seem to improve significantly and his oxygen saturations were greater than 96%.                      Talib Ambriz MD  [facility]

## 2017-12-02 NOTE — LETTER
[facility]  [address]  [facility] [address]  Phone: [phone]  Fax: [phone]    December 2, 2017        Meeta Rowell  [address] [place] [address]          To whom it may concern:    RE: Meeta Rowell    Patient was seen and treated today at our clinic and missed work. Unable to work on 12/1 and 11/2-11/3/17    Please contact me for questions or concerns.      Sincerely,        Talib Ambriz MD

## 2017-12-12 ENCOUNTER — OFFICE VISIT (OUTPATIENT)
Dept: PEDIATRICS | Facility: CLINIC | Age: 47
End: 2017-12-12
Payer: COMMERCIAL

## 2017-12-12 VITALS
DIASTOLIC BLOOD PRESSURE: 70 MMHG | HEIGHT: 61 IN | BODY MASS INDEX: 36.76 KG/M2 | HEART RATE: 64 BPM | OXYGEN SATURATION: 98 % | TEMPERATURE: 97.7 F | SYSTOLIC BLOOD PRESSURE: 100 MMHG | WEIGHT: 194.7 LBS

## 2017-12-12 DIAGNOSIS — F33.1 MODERATE RECURRENT MAJOR DEPRESSION (H): ICD-10-CM

## 2017-12-12 DIAGNOSIS — F51.04 PSYCHOPHYSIOLOGICAL INSOMNIA: ICD-10-CM

## 2017-12-12 DIAGNOSIS — K50.90 CROHN'S DISEASE WITHOUT COMPLICATION, UNSPECIFIED GASTROINTESTINAL TRACT LOCATION (H): ICD-10-CM

## 2017-12-12 DIAGNOSIS — R73.01 IMPAIRED FASTING GLUCOSE: ICD-10-CM

## 2017-12-12 DIAGNOSIS — Z30.41 ENCOUNTER FOR SURVEILLANCE OF CONTRACEPTIVE PILLS: ICD-10-CM

## 2017-12-12 DIAGNOSIS — D84.9 IMMUNOSUPPRESSED STATUS (H): ICD-10-CM

## 2017-12-12 DIAGNOSIS — Z00.00 ROUTINE GENERAL MEDICAL EXAMINATION AT A HEALTH CARE FACILITY: Primary | ICD-10-CM

## 2017-12-12 DIAGNOSIS — J20.9 ACUTE BRONCHITIS, UNSPECIFIED ORGANISM: ICD-10-CM

## 2017-12-12 DIAGNOSIS — Z13.6 CARDIOVASCULAR SCREENING; LDL GOAL LESS THAN 160: ICD-10-CM

## 2017-12-12 DIAGNOSIS — E66.01 MORBID OBESITY (H): ICD-10-CM

## 2017-12-12 DIAGNOSIS — F33.1 MAJOR DEPRESSIVE DISORDER, RECURRENT EPISODE, MODERATE (H): ICD-10-CM

## 2017-12-12 DIAGNOSIS — Z23 ENCOUNTER FOR IMMUNIZATION: ICD-10-CM

## 2017-12-12 DIAGNOSIS — E89.0 POSTSURGICAL HYPOTHYROIDISM: ICD-10-CM

## 2017-12-12 PROCEDURE — 90715 TDAP VACCINE 7 YRS/> IM: CPT | Performed by: INTERNAL MEDICINE

## 2017-12-12 PROCEDURE — 99213 OFFICE O/P EST LOW 20 MIN: CPT | Mod: 25 | Performed by: INTERNAL MEDICINE

## 2017-12-12 PROCEDURE — 90471 IMMUNIZATION ADMIN: CPT | Performed by: INTERNAL MEDICINE

## 2017-12-12 PROCEDURE — 99396 PREV VISIT EST AGE 40-64: CPT | Mod: 25 | Performed by: INTERNAL MEDICINE

## 2017-12-12 RX ORDER — FLUOXETINE 40 MG/1
40 CAPSULE ORAL DAILY
Qty: 90 CAPSULE | Refills: 3 | Status: SHIPPED | OUTPATIENT
Start: 2017-12-12 | End: 2019-01-15

## 2017-12-12 RX ORDER — CODEINE PHOSPHATE AND GUAIFENESIN 10; 100 MG/5ML; MG/5ML
1 SOLUTION ORAL EVERY 4 HOURS PRN
Qty: 120 ML | Refills: 0 | Status: ON HOLD | OUTPATIENT
Start: 2017-12-12 | End: 2018-08-13

## 2017-12-12 RX ORDER — BUSPIRONE HYDROCHLORIDE 30 MG/1
30 TABLET ORAL 2 TIMES DAILY
Qty: 180 TABLET | Refills: 3 | Status: SHIPPED | OUTPATIENT
Start: 2017-12-12 | End: 2018-12-17

## 2017-12-12 RX ORDER — BUPROPION HYDROCHLORIDE 450 MG/1
450 TABLET, FILM COATED, EXTENDED RELEASE ORAL EVERY MORNING
Qty: 90 TABLET | Refills: 3 | Status: SHIPPED | OUTPATIENT
Start: 2017-12-12 | End: 2019-01-15

## 2017-12-12 RX ORDER — BUDESONIDE AND FORMOTEROL FUMARATE DIHYDRATE 160; 4.5 UG/1; UG/1
2 AEROSOL RESPIRATORY (INHALATION) 2 TIMES DAILY
Qty: 1 INHALER | Refills: 1 | Status: ON HOLD | OUTPATIENT
Start: 2017-12-12 | End: 2018-08-13

## 2017-12-12 RX ORDER — TRAZODONE HYDROCHLORIDE 100 MG/1
100-400 TABLET ORAL AT BEDTIME
Qty: 120 TABLET | Refills: 3 | Status: SHIPPED | OUTPATIENT
Start: 2017-12-12 | End: 2018-04-27

## 2017-12-12 RX ORDER — DROSPIRENONE AND ETHINYL ESTRADIOL 0.03MG-3MG
1 KIT ORAL DAILY
Qty: 112 TABLET | Refills: 4 | Status: SHIPPED | OUTPATIENT
Start: 2017-12-12 | End: 2019-01-15

## 2017-12-12 NOTE — LETTER
December 12, 2017      Meeta Rowell  [address] [place] [address]        To Whom It May Concern:    Meeta Rowell was seen in our clinic on 12/2 and 12/12. Her absences on 12/6 & 7 should be considered excused due to illness.  She may return to work without restrictions.      Sincerely,        Jesenia Weinberg MD

## 2017-12-12 NOTE — NURSING NOTE
"Chief Complaint   Patient presents with     Physical       Initial /70 (BP Location: Right arm, Patient Position: Chair, Cuff Size: Adult Regular)  Pulse 64  Temp 97.7  F (36.5  C) (Oral)  Ht 5' 1\" (1.549 m)  Wt 194 lb 11.2 oz (88.3 kg)  SpO2 98%  BMI 36.79 kg/m2 Estimated body mass index is 36.79 kg/(m^2) as calculated from the following:    Height as of this encounter: 5' 1\" (1.549 m).    Weight as of this encounter: 194 lb 11.2 oz (88.3 kg).  Medication Reconciliation: complete   Ana Maria Ling MA    "

## 2017-12-12 NOTE — PROGRESS NOTES
SUBJECTIVE:   CC: Meeta Rowell is an 47 year old woman who presents for preventive health visit.     Physical   Annual:     Getting at least 3 servings of Calcium per day::  NO    Bi-annual eye exam::  Yes    Dental care twice a year::  NO    Sleep apnea or symptoms of sleep apnea::  None    Diet::  Regular (no restrictions)    Frequency of exercise::  None    Taking medications regularly::  Yes    Medication side effects::  None    Additional concerns today::  YES (f/u on infection)    URI since 21/1, seen in UC and started on cefdinir and prednisone with albuterol.  Just finished antibiotic and still feels somewhat SOB and still coughing up crud.  CXR negative.  Having GI side effects with antibiotic.  Having fecal incontinence as liquid and cough triggers.  Albuterol neb helpful but MDI not that beneficial.      Today's PHQ-2 Score:   PHQ-2 ( 1999 Pfizer) 12/12/2017   Q1: Little interest or pleasure in doing things 1   Q2: Feeling down, depressed or hopeless 1   PHQ-2 Score 2   Q1: Little interest or pleasure in doing things Several days   Q2: Feeling down, depressed or hopeless Several days   PHQ-2 Score 2     PHQ-9 SCORE 12/5/2016 1/6/2017 4/26/2017   Total Score - - -   Total Score MyChart - - -   Total Score 18 9 8        Abuse: Current or Past(Physical, Sexual or Emotional)- No  Do you feel safe in your environment - Yes    Social History   Substance Use Topics     Smoking status: Never Smoker     Smokeless tobacco: Never Used     Alcohol use 0.0 oz/week     0 Standard drinks or equivalent per week      Comment: once or twice a month     The patient does not drink >3 drinks per day nor >7 drinks per week.    Reviewed orders with patient.  Reviewed health maintenance and updated orders accordingly - Yes  Labs reviewed in EPIC    Patient under age 50, mutual decision reflected in health maintenance.        Pertinent mammograms are reviewed under the imaging tab.  History of abnormal Pap smear: NO - age  "30-65 PAP every 5 years with negative HPV co-testing recommended    Reviewed and updated as needed this visit by clinical staff  Tobacco  Allergies  Med Hx  Surg Hx  Fam Hx  Soc Hx        Reviewed and updated as needed this visit by Provider  Allergies          Review of Systems  C: NEGATIVE for fever, chills, change in weight  I: NEGATIVE for worrisome rashes, moles or lesions  E: NEGATIVE for vision changes or irritation  ENT: NEGATIVE for ear, mouth and throat problems  R: see HPI, otherwise negative   B: NEGATIVE for masses, tenderness or discharge  CV: NEGATIVE for chest pain, palpitations or peripheral edema  GI: NEGATIVE for nausea, abdominal pain, heartburn, or change in bowel habits  : NEGATIVE for unusual urinary or vaginal symptoms. No vaginal bleeding.  M: NEGATIVE for significant arthralgias or myalgia  N: NEGATIVE for weakness, dizziness or paresthesias  P: NEGATIVE for changes in mood or affect      OBJECTIVE:   /70 (BP Location: Right arm, Patient Position: Chair, Cuff Size: Adult Regular)  Pulse 64  Temp 97.7  F (36.5  C) (Oral)  Ht 5' 1\" (1.549 m)  Wt 194 lb 11.2 oz (88.3 kg)  SpO2 98%  BMI 36.79 kg/m2  Physical Exam  GENERAL: healthy, alert and no distress  EYES: Eyes grossly normal to inspection, PERRL and conjunctivae and sclerae normal  HENT: ear canals and TM's normal, nose and mouth without ulcers or lesions  NECK: no adenopathy, no asymmetry, masses, or scars and thyroid normal to palpation  RESP: lungs clear to auscultation - no rales, rhonchi or wheezes  CV: regular rate and rhythm, normal S1 S2, no S3 or S4, no murmur, click or rub, no peripheral edema and peripheral pulses strong  ABDOMEN: soft, nontender, no hepatosplenomegaly, no masses and bowel sounds normal  MS: no gross musculoskeletal defects noted, no edema  SKIN: no suspicious lesions or rashes  NEURO: Normal strength and tone, mentation intact and speech normal  PSYCH: mentation appears normal, affect " normal/bright    ASSESSMENT/PLAN:   1. Routine general medical examination at a health care facility  Routine health education discussed: calcium, diet, exercise, weight, safety.     2. Moderate recurrent major depression (H)  Borderline control.  Feels doing ok.  Refilled medications.  - DEPRESSION ACTION PLAN (DAP)  - BuPROPion HCl ER, XL, 450 MG TB24; Take 450 mg by mouth every morning Fill at Patient request. Can substitute one 150 mg and one 300 mg if needed.  Dispense: 90 tablet; Refill: 3  - FLUoxetine (PROZAC) 40 MG capsule; Take 1 capsule (40 mg) by mouth daily  Dispense: 90 capsule; Refill: 3    3. Acute bronchitis, unspecified organism  Immunosuppressed but sp antibiotic and having side effects.  No evidence of serious infection - normal exam, no fever, no acute respiratory distress and neg CXR.  Trial symbicort to cover inflammation and bronchospasm and notify if not improving.  - budesonide-formoterol (SYMBICORT) 160-4.5 MCG/ACT Inhaler; Inhale 2 puffs into the lungs 2 times daily  Dispense: 1 Inhaler; Refill: 1  - guaiFENesin-codeine (ROBITUSSIN AC) 100-10 MG/5ML SOLN solution; Take 5 mLs by mouth every 4 hours as needed for cough  Dispense: 120 mL; Refill: 0    4. Major depressive disorder, recurrent episode, moderate (H)    - BusPIRone HCl 30 MG TABS; Take 30 mg by mouth 2 times daily  Dispense: 180 tablet; Refill: 3    5. Encounter for surveillance of contraceptive pills  Refilled, medication use discussed  - drospirenone-ethinyl estradiol (KANA 28) 3-0.03 MG per tablet; Take 1 tablet by mouth daily Take active pills daily  Dispense: 112 tablet; Refill: 4    6. Psychophysiological insomnia  refilled  - traZODone (DESYREL) 100 MG tablet; Take 1-4 tablets (100-400 mg) by mouth At Bedtime  Dispense: 120 tablet; Refill: 3    7. Impaired fasting glucose  Discussed glucose elevation.  Discuss this is a pre-diabetic condition.  Recommended eating healthily, exercising and maintaining a healthy weight to  "prevent the development of diabetes.  Recommended blood sugar checks at least yearly to monitor this.  Recommended dietary consultation which the patient declined.   - **A1C FUTURE anytime; Future    8. CARDIOVASCULAR SCREENING; LDL GOAL LESS THAN 160    - Lipid panel reflex to direct LDL Fasting; Future    9. Encounter for immunization  Counseling provided regarding the benefits and risks related to the vaccines ordered today. I reviewed the signs and symptoms of adverse effects and when to seek medical care if they should arise.   - TDAP VACCINE (ADACEL)    10. Morbid obesity (H)  Reviewed the treatment options for obesity including diet and exercise regimens.  Emphasized that lifestyle change, and most particularly regular exercise, is a critical component of all treatment plans and offered nutrion referral.  Discussed reasonable weight loss goals and time-frame.     11. Crohn's disease without complication, unspecified gastrointestinal tract location (H)  Follow-up with GI    12. Immunosuppressed status (H)  As above, notify if worsening symptoms     13. Hypothyroid with history of thyroid cancer  Follow-up with endo recommended      COUNSELING:  Reviewed preventive health counseling, as reflected in patient instructions         reports that she has never smoked. She has never used smokeless tobacco.    Estimated body mass index is 36.79 kg/(m^2) as calculated from the following:    Height as of this encounter: 5' 1\" (1.549 m).    Weight as of this encounter: 194 lb 11.2 oz (88.3 kg).   Weight management plan: Discussed healthy diet and exercise guidelines and patient will follow up in 12 months in clinic to re-evaluate.    Counseling Resources:  ATP IV Guidelines  Pooled Cohorts Equation Calculator  Breast Cancer Risk Calculator  FRAX Risk Assessment  ICSI Preventive Guidelines  Dietary Guidelines for Americans, 2010  USDA's MyPlate  ASA Prophylaxis  Lung CA Screening    Jesenia Weinberg MD  [facility] " [facility]

## 2017-12-12 NOTE — TELEPHONE ENCOUNTER
Panel Management Review      Patient has the following on her problem list:     Depression / Dysthymia review    Measure:  Needs PHQ-9 score of 4 or less during index window.  Administer PHQ-9 and if score is 5 or more, send encounter to provider for next steps.    5 - 7 month window range:     PHQ-9 SCORE 12/5/2016 1/6/2017 4/26/2017   Total Score - - -   Total Score MyChart - - -   Total Score 18 9 8       If PHQ-9 recheck is 5 or more, route to provider for next steps.    Patient is due for:  PHQ9 and DAP        Composite cancer screening  Chart review shows that this patient is due/due soon for the following None  Summary:    Patient is due/failing the following:   DAP, LDL, PHQ9 and PHYSICAL    Action needed:   Patient needs office visit for physical . and complete phq9    Type of outreach:    None pt has appt scheduled today for physical    Questions for provider review:    None                                                                                                                                    Ana Maria Ling MA       Closing encounter .

## 2017-12-12 NOTE — PATIENT INSTRUCTIONS
Preventive Health Recommendations  Female Ages 40 to 49    Yearly exam:     See your health care provider every year in order to  1. Review health changes.   2. Discuss preventive care.    3. Review your medicines if your doctor prescribed any.        If you get Pap tests with HPV test, you only need to test every 5 years, unless you have an abnormal result. You are due 2020    Ask your doctor if you should have a mammogram.      Have a colonoscopy - call GI to schedule       Have a cholesterol test every 5 years.       Have a diabetes test (fasting glucose) every year    Shots: Get a flu shot each year. Get a tetanus shot every 10 years.     Nutrition:     Eat at least 5 servings of fruits and vegetables each day.    Eat whole-grain bread, whole-wheat pasta and brown rice instead of white grains and rice.    Talk to your provider about Calcium and Vitamin D.     Lifestyle    Exercise at least 150 minutes a week (an average of 30 minutes a day, 5 days a week). This will help you control your weight and prevent disease.    Limit alcohol to one drink per day.    No smoking.     Wear sunscreen to prevent skin cancer.    See your dentist every six months for an exam and cleaning.    Follow-up with Dr. Kaur about your thyroid    Schedule your colonoscopy with GI

## 2017-12-12 NOTE — MR AVS SNAPSHOT
After Visit Summary   12/12/2017    Meeta Rowell    MRN: [id number]           Patient Information     Date Of Birth          1970        Visit Information        Provider Department      12/12/2017 2:10 PM Jesenia Weinberg MD [facility]        Today's Diagnoses     Routine general medical examination at a health care facility    -  1    Moderate recurrent major depression (H)        Acute bronchitis, unspecified organism        Major depressive disorder, recurrent episode, moderate (H)        Encounter for surveillance of contraceptive pills        Psychophysiological insomnia        Impaired fasting glucose        CARDIOVASCULAR SCREENING; LDL GOAL LESS THAN 160        Encounter for immunization          Care Instructions      Preventive Health Recommendations  Female Ages 40 to 49    Yearly exam:     See your health care provider every year in order to  1. Review health changes.   2. Discuss preventive care.    3. Review your medicines if your doctor prescribed any.        If you get Pap tests with HPV test, you only need to test every 5 years, unless you have an abnormal result. You are due 2020    Ask your doctor if you should have a mammogram.      Have a colonoscopy - call GI to schedule       Have a cholesterol test every 5 years.       Have a diabetes test (fasting glucose) every year    Shots: Get a flu shot each year. Get a tetanus shot every 10 years.     Nutrition:     Eat at least 5 servings of fruits and vegetables each day.    Eat whole-grain bread, whole-wheat pasta and brown rice instead of white grains and rice.    Talk to your provider about Calcium and Vitamin D.     Lifestyle    Exercise at least 150 minutes a week (an average of 30 minutes a day, 5 days a week). This will help you control your weight and prevent disease.    Limit alcohol to one drink per day.    No smoking.     Wear sunscreen to prevent skin cancer.    See your dentist every six months for an  "exam and cleaning.    Follow-up with Dr. Kaur about your thyroid    Schedule your colonoscopy with GI          Follow-ups after your visit        Follow-up notes from your care team     Return in about 1 year (around 12/12/2018) for Physical Exam.      Future tests that were ordered for you today     Open Future Orders        Priority Expected Expires Ordered    **A1C FUTURE anytime Routine 12/12/2017 12/12/2018 12/12/2017    Lipid panel reflex to direct LDL Fasting Routine 12/12/2017 12/12/2018 12/12/2017            Who to contact     If you have questions or need follow up information about today's clinic visit or your schedule please contact [facility] directly at [phone].  Normal or non-critical lab and imaging results will be communicated to you by [facility] [employer], letter or phone within 4 business days after the clinic has received the results. If you do not hear from us within 7 days, please contact the clinic through [facility] or phone. If you have a critical or abnormal lab result, we will notify you by phone as soon as possible.  Submit refill requests through [employer] or call your pharmacy and they will forward the refill request to us. Please allow 3 business days for your refill to be completed.          Additional Information About Your Visit        SMR SITEhart Information     [employer] gives you secure access to your electronic health record. If you see a primary care provider, you can also send messages to your care team and make appointments. If you have questions, please call your primary care clinic.  If you do not have a primary care provider, please call [phone] and they will assist you.        Care EveryWhere ID     This is your Care EveryWhere ID. This could be used by other organizations to access your [place] medical records  [id number]        Your Vitals Were     Pulse Temperature Height Pulse Oximetry BMI (Body Mass Index)       64 97.7  F (36.5  C) (Oral) 5' 1\" (1.549 m) 98% " 36.79 kg/m2        Blood Pressure from Last 3 Encounters:   12/12/17 100/70   12/02/17 110/79   06/15/17 110/70    Weight from Last 3 Encounters:   12/12/17 194 lb 11.2 oz (88.3 kg)   06/15/17 202 lb 11.2 oz (91.9 kg)   04/26/17 204 lb (92.5 kg)              We Performed the Following     DEPRESSION ACTION PLAN (DAP)     TDAP VACCINE (ADACEL)          Today's Medication Changes          These changes are accurate as of: 12/12/17  2:53 PM.  If you have any questions, ask your nurse or doctor.               Start taking these medicines.        Dose/Directions    budesonide-formoterol 160-4.5 MCG/ACT Inhaler   Commonly known as:  SYMBICORT   Used for:  Acute bronchitis, unspecified organism   Started by:  Jesenia Weinberg MD        Dose:  2 puff   Inhale 2 puffs into the lungs 2 times daily   Quantity:  1 Inhaler   Refills:  1            Where to get your medicines      These medications were sent to [address] [address]     Phone:  [phone]     budesonide-formoterol 160-4.5 MCG/ACT Inhaler    BuPROPion HCl ER (XL) 450 MG Tb24    BusPIRone HCl 30 MG Tabs    drospirenone-ethinyl estradiol 3-0.03 MG per tablet    FLUoxetine 40 MG capsule    traZODone 100 MG tablet         Some of these will need a paper prescription and others can be bought over the counter.  Ask your nurse if you have questions.     Bring a paper prescription for each of these medications     guaiFENesin-codeine 100-10 MG/5ML Soln solution                Primary Care Provider Office Phone # Fax #    Jesenia Weinberg [phone] [address] [place] [address]        Equal Access to Services     [facility] SHAHEEN : Mervin Romero, wareilly lusarah, qaybta kaalpetros hu, christine juan. So [facility] [phone].    ATENCIÓN: Si habla español, tiene a domingo disposición servicios gratuitos de  asistencia lingüística. Aniyah al [phone].    We comply with applicable federal civil rights laws and [place] laws. We do not discriminate on the basis of race, color, national origin, age, disability, sex, sexual orientation, or gender identity.            Thank you!     Thank you for choosing [facility]  for your care. Our goal is always to provide you with excellent care. Hearing back from our patients is one way we can continue to improve our services. Please take a few minutes to complete the written survey that you may receive in the mail after your visit with us. Thank you!             Your Updated Medication List - Protect others around you: Learn how to safely use, store and throw away your medicines at www.disposemymeds.org.          This list is accurate as of: 12/12/17  2:53 PM.  Always use your most recent med list.                   Brand Name Dispense Instructions for use Diagnosis    albuterol 108 (90 BASE) MCG/ACT Inhaler    PROAIR HFA/PROVENTIL HFA/VENTOLIN HFA    1 Inhaler    Inhale 2 puffs into the lungs every 6 hours as needed for shortness of breath / dyspnea or wheezing    Cough, SOB (shortness of breath)       budesonide-formoterol 160-4.5 MCG/ACT Inhaler    SYMBICORT    1 Inhaler    Inhale 2 puffs into the lungs 2 times daily    Acute bronchitis, unspecified organism       BuPROPion HCl ER (XL) 450 MG Tb24     90 tablet    Take 450 mg by mouth every morning Fill at Patient request. Can substitute one 150 mg and one 300 mg if needed.    Moderate recurrent major depression (H)       BusPIRone HCl 30 MG Tabs     180 tablet    Take 30 mg by mouth 2 times daily    Major depressive disorder, recurrent episode, moderate (H)       drospirenone-ethinyl estradiol 3-0.03 MG per tablet    KANA 28    112 tablet    Take 1 tablet by mouth daily Take active pills daily    Encounter for surveillance of contraceptive pills       FLUoxetine 40 MG capsule    PROzac    90 capsule    Take 1  capsule (40 mg) by mouth daily    Moderate recurrent major depression (H)       guaiFENesin-codeine 100-10 MG/5ML Soln solution    ROBITUSSIN AC    120 mL    Take 5 mLs by mouth every 4 hours as needed for cough    Acute bronchitis, unspecified organism       LANsoprazole 30 MG CR capsule    PREVACID     Take 30 mg by mouth daily        levothyroxine 200 MCG tablet    SYNTHROID/LEVOTHROID    30 tablet    Take 1 tablet (200 mcg) by mouth daily    Thyroid cancer (H), S/P total thyroidectomy       propranolol 60 MG 24 hr capsule    INDERAL LA    90 capsule    Take 1 capsule (60 mg) by mouth daily    Generalized anxiety disorder       REMICADE 100 MG injection   Generic drug:  inFLIXimab      Inject 3 mg/kg into the vein        traZODone 100 MG tablet    DESYREL    120 tablet    Take 1-4 tablets (100-400 mg) by mouth At Bedtime    Psychophysiological insomnia

## 2017-12-12 NOTE — LETTER
My Depression Action Plan  Name: Meeta Rowell   Date of Birth 1970  Date: 12/12/2017    My doctor: Jesenia Weinberg   My clinic: [address]  [address]  [facility] [address] [phone]          GREEN    ZONE   Good Control    What it looks like:     Things are going generally well. You have normal up s and down s. You may even feel depressed from time to time, but bad moods usually last less than a day.   What you need to do:  1. Continue to care for yourself (see self care plan)  2. Check your depression survival kit and update it as needed  3. Follow your physician s recommendations including any medication.  4. Do not stop taking medication unless you consult with your physician first.           YELLOW         ZONE Getting Worse    What it looks like:     Depression is starting to interfere with your life.     It may be hard to get out of bed; you may be starting to isolate yourself from others.    Symptoms of depression are starting to last most all day and this has happened for several days.     You may have suicidal thoughts but they are not constant.   What you need to do:     1. Call your care team, your response to treatment will improve if you keep your care team informed of your progress. Yellow periods are signs an adjustment may need to be made.     2. Continue your self-care, even if you have to fake it!    3. Talk to someone in your support network    4. Open up your depression survival kit           RED    ZONE Medical Alert - Get Help    What it looks like:     Depression is seriously interfering with your life.     You may experience these or other symptoms: You can t get out of bed most days, can t work or engage in other necessary activities, you have trouble taking care of basic hygiene, or basic responsibilities, thoughts of suicide or death that will not go away, self-injurious behavior.     What you need to do:  1. Call your  care team and request a same-day appointment. If they are not available (weekends or after hours) call your local crisis line, emergency room or 911.      Electronically signed by: Ana Maria Ling, December 12, 2017    Depression Self Care Plan / Survival Kit    Self-Care for Depression  Here s the deal. Your body and mind are really not as separate as most people think.  What you do and think affects how you feel and how you feel influences what you do and think. This means if you do things that people who feel good do, it will help you feel better.  Sometimes this is all it takes.  There is also a place for medication and therapy depending on how severe your depression is, so be sure to consult with your medical provider and/ or Behavioral Health Consultant if your symptoms are worsening or not improving.     In order to better manage my stress, I will:    Exercise  Get some form of exercise, every day. This will help reduce pain and release endorphins, the  feel good  chemicals in your brain. This is almost as good as taking antidepressants!  This is not the same as joining a gym and then never going! (they count on that by the way ) It can be as simple as just going for a walk or doing some gardening, anything that will get you moving.      Hygiene   Maintain good hygiene (Get out of bed in the morning, Make your bed, Brush your teeth, Take a shower, and Get dressed like you were going to work, even if you are unemployed).  If your clothes don't fit try to get ones that do.    Diet  I will strive to eat foods that are good for me, drink plenty of water, and avoid excessive sugar, caffeine, alcohol, and other mood-altering substances.  Some foods that are helpful in depression are: complex carbohydrates, B vitamins, flaxseed, fish or fish oil, fresh fruits and vegetables.    Psychotherapy  I agree to participate in Individual Therapy (if recommended).    Medication  If prescribed medications, I agree to take  them.  Missing doses can result in serious side effects.  I understand that drinking alcohol, or other illicit drug use, may cause potential side effects.  I will not stop my medication abruptly without first discussing it with my provider.    Staying Connected With Others  I will stay in touch with my friends, family members, and my primary care provider/team.    Use your imagination  Be creative.  We all have a creative side; it doesn t matter if it s oil painting, sand castles, or mud pies! This will also kick up the endorphins.    Witness Beauty  (AKA stop and smell the roses) Take a look outside, even in mid-winter. Notice colors, textures. Watch the squirrels and birds.     Service to others  Be of service to others.  There is always someone else in need.  By helping others we can  get out of ourselves  and remember the really important things.  This also provides opportunities for practicing all the other parts of the program.    Humor  Laugh and be silly!  Adjust your TV habits for less news and crime-drama and more comedy.    Control your stress  Try breathing deep, massage therapy, biofeedback, and meditation. Find time to relax each day.     My support system    Clinic Contact:  Phone number:    Contact 1:  Phone number:    Contact 2:  Phone number:    Voodoo/:  Phone number:    Therapist:  Phone number:    Local crisis center:    Phone number:    Other community support:  Phone number:

## 2017-12-17 PROBLEM — D84.9 IMMUNOSUPPRESSED STATUS (H): Status: ACTIVE | Noted: 2017-12-17

## 2017-12-26 ENCOUNTER — MYC REFILL (OUTPATIENT)
Dept: PEDIATRICS | Facility: CLINIC | Age: 47
End: 2017-12-26

## 2017-12-26 DIAGNOSIS — E89.0 S/P TOTAL THYROIDECTOMY: ICD-10-CM

## 2017-12-26 DIAGNOSIS — C73 THYROID CANCER (H): ICD-10-CM

## 2017-12-26 NOTE — LETTER
February 27, 2018    Meeta Rowell  [address] [place] [address]    Dear Meeta,  This letter is being sent on behalf of Dr. Donis. It is to remind you that your provider expected you to return for a follow up clinic visit. Please make a lab only appointment, and then follow up with a clinic visit one week afterwards to review those results. If this is not done, it may result in your provider not being able to refill your medications.     You may call our office at [phone] ([place]) or [phone] ([place]) to schedule an appointment.     If you have already scheduled these appointments, please disregard this notice.      Sincerely,    [facility],  Dr. Alice Donis

## 2017-12-26 NOTE — LETTER
[address] [address]  [phone]      January 8, 2018    Meeta Rowell                                                                                                                                                       [address] [place] [address]              Dear Meeta,    You are due for an office appointment to follow up on your thyroid cancer and refills on Levothyroxine. Please make an appointment before your refill is due.    Sincerely,    Dr. Donis/maikol

## 2017-12-27 NOTE — TELEPHONE ENCOUNTER
Patient due to recheck her TSH. Will inquire when she plans to do so. Will await response then refill.       LEVOTHYROXINE     Last Written Prescription Date: 11/30/17  Last Quantity: 30, # refills: 0  Last Office Visit with G, P or [facility] prescribing provider: 12/12/17        TSH   Date Value Ref Range Status   06/15/2017 0.27 (L) 0.40 - 4.00 mU/L Final

## 2017-12-27 NOTE — TELEPHONE ENCOUNTER
Message from [employer]:  Original authorizing provider: MD Meeta Rodgers would like a refill of the following medications:  levothyroxine (SYNTHROID/LEVOTHROID) 200 MCG tablet [Jesenia Weinberg MD]    Preferred pharmacy: [place], [place] - [address] [facility]    Comment:

## 2017-12-28 ENCOUNTER — HOSPITAL ENCOUNTER (OUTPATIENT)
Dept: LAB | Facility: CLINIC | Age: 47
Discharge: HOME | End: 2017-12-28
Attending: INTERNAL MEDICINE | Admitting: INTERNAL MEDICINE
Payer: COMMERCIAL

## 2017-12-28 DIAGNOSIS — D50.9 ANEMIA, IRON DEFICIENCY: Primary | ICD-10-CM

## 2017-12-28 DIAGNOSIS — D50.9 ANEMIA, IRON DEFICIENCY: ICD-10-CM

## 2017-12-28 DIAGNOSIS — R73.01 IMPAIRED FASTING GLUCOSE: ICD-10-CM

## 2017-12-28 DIAGNOSIS — Z13.6 CARDIOVASCULAR SCREENING; LDL GOAL LESS THAN 160: ICD-10-CM

## 2017-12-28 DIAGNOSIS — E89.0 POSTSURGICAL HYPOTHYROIDISM: ICD-10-CM

## 2017-12-28 LAB
CHOLEST SERPL-MCNC: 201 MG/DL
FERRITIN SERPL-MCNC: 30 NG/ML (ref 8–252)
HBA1C MFR BLD: 5.8 % (ref 4.3–6)
HDLC SERPL-MCNC: 58 MG/DL
IRON SATN MFR SERPL: 19 % (ref 15–46)
IRON SERPL-MCNC: 76 UG/DL (ref 35–180)
LDLC SERPL CALC-MCNC: 94 MG/DL
NONHDLC SERPL-MCNC: 143 MG/DL
T3FREE SERPL-MCNC: 2.9 PG/ML (ref 2.3–4.2)
T4 FREE SERPL-MCNC: 1.58 NG/DL (ref 0.76–1.46)
TIBC SERPL-MCNC: 404 UG/DL (ref 240–430)
TRIGL SERPL-MCNC: 243 MG/DL
TSH SERPL DL<=0.005 MIU/L-ACNC: 0.02 MU/L (ref 0.4–4)

## 2017-12-28 PROCEDURE — 82728 ASSAY OF FERRITIN: CPT | Performed by: INTERNAL MEDICINE

## 2017-12-28 PROCEDURE — 83550 IRON BINDING TEST: CPT | Performed by: INTERNAL MEDICINE

## 2017-12-28 PROCEDURE — 84481 FREE ASSAY (FT-3): CPT | Performed by: INTERNAL MEDICINE

## 2017-12-28 PROCEDURE — 83036 HEMOGLOBIN GLYCOSYLATED A1C: CPT | Performed by: INTERNAL MEDICINE

## 2017-12-28 PROCEDURE — 84439 ASSAY OF FREE THYROXINE: CPT | Performed by: INTERNAL MEDICINE

## 2017-12-28 PROCEDURE — 36415 COLL VENOUS BLD VENIPUNCTURE: CPT | Performed by: INTERNAL MEDICINE

## 2017-12-28 PROCEDURE — 80061 LIPID PANEL: CPT | Performed by: INTERNAL MEDICINE

## 2017-12-28 PROCEDURE — 84443 ASSAY THYROID STIM HORMONE: CPT | Performed by: INTERNAL MEDICINE

## 2017-12-28 PROCEDURE — 83540 ASSAY OF IRON: CPT | Performed by: INTERNAL MEDICINE

## 2017-12-29 NOTE — TELEPHONE ENCOUNTER
Labs back, routing to Dr. Donis to advise on Levothyroxine 200 mcg qd refill.     Component      Latest Ref Rng & Units 12/15/2016 6/15/2017 12/28/2017   T4 Free      0.76 - 1.46 ng/dL 1.32 1.34 1.58 (H)   Free T3      2.3 - 4.2 pg/mL 2.2 (L)  2.9   TSH      0.40 - 4.00 mU/L 0.62 0.27 (L) 0.02 (L)

## 2018-01-02 RX ORDER — LEVOTHYROXINE SODIUM 200 UG/1
200 TABLET ORAL DAILY
Qty: 30 TABLET | Refills: 3 | Status: SHIPPED | OUTPATIENT
Start: 2018-01-02 | End: 2018-02-15

## 2018-01-02 NOTE — TELEPHONE ENCOUNTER
Rx sent.    H/o thyroid cancer.    Pt also due for clinic visit.    Please send reminder letter to patient.

## 2018-02-15 ENCOUNTER — OFFICE VISIT (OUTPATIENT)
Dept: ENDOCRINOLOGY | Facility: CLINIC | Age: 48
End: 2018-02-15
Payer: COMMERCIAL

## 2018-02-15 VITALS
HEIGHT: 61 IN | HEART RATE: 77 BPM | SYSTOLIC BLOOD PRESSURE: 110 MMHG | DIASTOLIC BLOOD PRESSURE: 70 MMHG | WEIGHT: 197.2 LBS | BODY MASS INDEX: 37.23 KG/M2 | TEMPERATURE: 98.1 F | OXYGEN SATURATION: 96 %

## 2018-02-15 DIAGNOSIS — E89.0 S/P TOTAL THYROIDECTOMY: ICD-10-CM

## 2018-02-15 DIAGNOSIS — E89.0 POSTSURGICAL HYPOTHYROIDISM: Primary | ICD-10-CM

## 2018-02-15 DIAGNOSIS — C73 THYROID CANCER (H): ICD-10-CM

## 2018-02-15 PROCEDURE — 99214 OFFICE O/P EST MOD 30 MIN: CPT | Performed by: INTERNAL MEDICINE

## 2018-02-15 RX ORDER — LEVOTHYROXINE SODIUM 200 UG/1
TABLET ORAL
Qty: 30 TABLET | Refills: 3 | Status: SHIPPED | OUTPATIENT
Start: 2018-02-15 | End: 2018-07-05

## 2018-02-15 NOTE — MR AVS SNAPSHOT
After Visit Summary   2/15/2018    Meeta Rowell    MRN: [id number]           Patient Information     Date Of Birth          1970        Visit Information        Provider Department      2/15/2018 2:30 PM Alice Donis MD [facility]        Today's Diagnoses     Postsurgical hypothyroidism    -  1    Thyroid cancer (H)        S/P total thyroidectomy          Care Instructions    [facility] & [place] locations   Dr Donis, Endocrinology Department      [facility]   [address]  [place] [address]  Appointment [phone]  Fax: [phone]  [place]: Monday and Tuesday         [facility] [address] [facility] [address]  [place] [address]  Appointment [phone]  Fax: [phone]  [place]: Wednesday and Thursday          Decrease levothyroxine.  Take 200 mcg X 6 days a week and 100 mcg/day X 1 day a week.  Labs in 2 months  Please make a lab appointment for blood work and follow up clinic appointment in 1 week after that to discuss results.    Whole Body Radioiodine Scan - Thyrogen 2 Dose: PATIÑO I 131    Start low iodine diet 2 weeks to 10 days before procedure    Day #1 Thyrogen 0.9 mg IM  __________    Day #2 Thyrogen 0.9 mg IM  __________  (serum Pregnancy for women)    Day #3   - labs in AM  - whole body scan    Day # 11  Lab work _____________  - lab work (TG, TGAb, TSH)     Thyrogen injection and Radioactive iodine treatment will be done at [facility]. (Radiology)     [facility] radiology scheduleing  [phone]       Please call and schedule the recommended test as above protocol as discussed in clinic visit.    Restrictions:  Foods to Avoid 10 days prior to test:  - iodized salt  - fish and sea food  - white bread  - cheese  - mayonasise  - cheese cake  - kelp/sea weed  - naan bread  - yorkshire pudding                 Follow-ups  "after your visit        Future tests that were ordered for you today     Open Future Orders        Priority Expected Expires Ordered    T4 free Routine  12/12/2018 2/15/2018    TSH Routine  12/12/2018 2/15/2018            Who to contact     If you have questions or need follow up information about today's clinic visit or your schedule please contact [facility] directly at [phone].  Normal or non-critical lab and imaging results will be communicated to you by [email or web address], letter or phone within 4 business days after the clinic has received the results. If you do not hear from us within 7 days, please contact the clinic through [employer] or phone. If you have a critical or abnormal lab result, we will notify you by phone as soon as possible.  Submit refill requests through [email or web address] or call your pharmacy and they will forward the refill request to us. Please allow 3 business days for your refill to be completed.          Additional Information About Your Visit        [email or web address] Information     [email or web address] gives you secure access to your electronic health record. If you see a primary care provider, you can also send messages to your care team and make appointments. If you have questions, please call your primary care clinic.  If you do not have a primary care provider, please call [phone] and they will assist you.        Care EveryWhere ID     This is your Care EveryWhere ID. This could be used by other organizations to access your [facility] medical records  [id number]        Your Vitals Were     Pulse Temperature Height Pulse Oximetry BMI (Body Mass Index)       77 98.1  F (36.7  C) (Oral) 1.549 m (5' 1\") 96% 37.26 kg/m2        Blood Pressure from Last 3 Encounters:   02/15/18 110/70   12/12/17 100/70   12/02/17 110/79    Weight from Last 3 Encounters:   02/15/18 89.4 kg (197 lb 3.2 oz)   12/12/17 88.3 kg (194 lb 11.2 oz)   06/15/17 91.9 kg (202 lb 11.2 oz)                 Today's Medication Changes       "    These changes are accurate as of 2/15/18  3:00 PM.  If you have any questions, ask your nurse or doctor.               These medicines have changed or have updated prescriptions.        Dose/Directions    levothyroxine 200 MCG tablet   Commonly known as:  SYNTHROID/LEVOTHROID   This may have changed:    - how much to take  - how to take this  - when to take this  - additional instructions   Used for:  Thyroid cancer (H), S/P total thyroidectomy   Changed by:  Alice Donis MD        Take 200 mcg X 6 days a week and 100 mcg/day X 1 day a week.   Quantity:  30 tablet   Refills:  3            Where to get your medicines      These medications were sent to [place], [place] - [address] [address]  [address] [address] [facility] [address]     Phone:  [phone]     levothyroxine 200 MCG tablet                Primary Care Provider Office Phone # Fax #    Jesenia Weinberg [phone] [address] [place] [address]        Equal Access to Services     [facility]: Hadii cat ku hadasho Soomaali, waaxda luqadaha, qaybta kaalmada adeegyada, waxay venancioin hayjuan sullivan . So [facility] [phone].    ATENCIÓN: Si habla español, tiene a domingo disposición servicios gratuitos de asistencia lingüística. [facility] [phone].    We comply with applicable federal civil rights laws and [place] laws. We do not discriminate on the basis of race, color, national origin, age, disability, sex, sexual orientation, or gender identity.            Thank you!     Thank you for choosing [facility]  for your care. Our goal is always to provide you with excellent care. Hearing back from our patients is one way we can continue to improve our services. Please take a few minutes to complete the written survey that you may receive in the mail after your visit with us. Thank you!             Your Updated Medication List - Protect others around  you: Learn how to safely use, store and throw away your medicines at www.disposemymeds.org.          This list is accurate as of 2/15/18  3:00 PM.  Always use your most recent med list.                   Brand Name Dispense Instructions for use Diagnosis    albuterol 108 (90 BASE) MCG/ACT Inhaler    PROAIR HFA/PROVENTIL HFA/VENTOLIN HFA    1 Inhaler    Inhale 2 puffs into the lungs every 6 hours as needed for shortness of breath / dyspnea or wheezing    Cough, SOB (shortness of breath)       budesonide-formoterol 160-4.5 MCG/ACT Inhaler    SYMBICORT    1 Inhaler    Inhale 2 puffs into the lungs 2 times daily    Acute bronchitis, unspecified organism       BuPROPion HCl ER (XL) 450 MG Tb24     90 tablet    Take 450 mg by mouth every morning Fill at Patient request. Can substitute one 150 mg and one 300 mg if needed.    Moderate recurrent major depression (H)       BusPIRone HCl 30 MG Tabs     180 tablet    Take 30 mg by mouth 2 times daily    Major depressive disorder, recurrent episode, moderate (H)       drospirenone-ethinyl estradiol 3-0.03 MG per tablet    KANA 28    112 tablet    Take 1 tablet by mouth daily Take active pills daily    Encounter for surveillance of contraceptive pills       FLUoxetine 40 MG capsule    PROzac    90 capsule    Take 1 capsule (40 mg) by mouth daily    Moderate recurrent major depression (H)       guaiFENesin-codeine 100-10 MG/5ML Soln solution    ROBITUSSIN AC    120 mL    Take 5 mLs by mouth every 4 hours as needed for cough    Acute bronchitis, unspecified organism       LANsoprazole 30 MG CR capsule    PREVACID     Take 30 mg by mouth daily        levothyroxine 200 MCG tablet    SYNTHROID/LEVOTHROID    30 tablet    Take 200 mcg X 6 days a week and 100 mcg/day X 1 day a week.    Thyroid cancer (H), S/P total thyroidectomy       propranolol 60 MG 24 hr capsule    INDERAL LA    90 capsule    Take 1 capsule (60 mg) by mouth daily    Generalized anxiety disorder       REMICADE 100  MG injection   Generic drug:  inFLIXimab      Inject 3 mg/kg into the vein        traZODone 100 MG tablet    DESYREL    120 tablet    Take 1-4 tablets (100-400 mg) by mouth At Bedtime    Psychophysiological insomnia

## 2018-02-15 NOTE — NURSING NOTE
"Chief Complaint   Patient presents with     Thyroid Problem     hx of thyroid cancer and postsurgical hypothyroidism        Initial /70 (BP Location: Left arm, Patient Position: Chair, Cuff Size: Adult Large)  Pulse 77  Temp 98.1  F (36.7  C) (Oral)  Ht 1.549 m (5' 1\")  Wt 89.4 kg (197 lb 3.2 oz)  SpO2 96%  BMI 37.26 kg/m2 Estimated body mass index is 37.26 kg/(m^2) as calculated from the following:    Height as of this encounter: 1.549 m (5' 1\").    Weight as of this encounter: 89.4 kg (197 lb 3.2 oz).  Medication Reconciliation: complete     ENDOCRINOLOGY INTAKE FORM    Patient Name:  Meeta Rowell  :  1970    Is patient Diabetic?   No  Does patient have non-diabetic or other endocrine issues?  Yes: thyroid cancer Hx, postsurgical hypothyroidism     Vitals: /70 (BP Location: Left arm, Patient Position: Chair, Cuff Size: Adult Large)  Pulse 77  Temp 98.1  F (36.7  C) (Oral)  Ht 1.549 m (5' 1\")  Wt 89.4 kg (197 lb 3.2 oz)  SpO2 96%  BMI 37.26 kg/m2  BMI= Body mass index is 37.26 kg/(m^2).    Flu vaccine:  No  Pneumonia vaccine:  Yes: 12/8/15, 16    Smoking and Alcohol use:  Social History   Substance Use Topics     Smoking status: Never Smoker     Smokeless tobacco: Never Used     Alcohol use 0.0 oz/week     0 Standard drinks or equivalent per week      Comment: once or twice a month       Staff Signature:  Kerri Caceres CMA ([facility])        "

## 2018-02-15 NOTE — LETTER
"    2/15/2018         RE: Meeta Rowell  [address] [place] [address]        Dear Colleague,    Thank you for referring your patient, eMeta Rowell, to the [facility]. Please see a copy of my visit note below.    Name: Meeta Rowell  F/u for postsurgical hypothyroidism and PTC. IRINA  and Nancy Garcia.  HPI:  Meeta Rowell is a 47 year old female with:    1.  Thyroid cancer:  She initially presented for the evaluation of thyroid nodule, first noted in 2013.  She was previously seen for this by Dr. Berry and recalls FNA biopsy which was \"benign\".  Then  she noted enlarged neck lymph nodes.  Repeat FNA biopsy of the 3.0 cm right thyroid nodule showed follicular lesion of undetermined significance. She underwent total thyroidectomy 12/11/2015 after Frozen section confirmed a follicular lesion and a small papillary carcinoma of the thyroid .  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 No abnormal activity to suggest  metastatic thyroid cancer.  Follow-up neck ultrasound 7/2016: No enlarged or abnormal-appearing lymph nodes are appreciated in the right or left neck.  TG ( suppressed ) : 1.3 ( 1/27/16)  TG ( stimulated ) : 2.7 ( 2/3/16)  In last clinic visit WBS was discussed but was not done.    Postsurgical hypothyroidism:  She was started on Levothyroxine 150 mcg after surgery, dose was increased to 175, then increased to currently dose 200 mcg qd.  S/p menopause.   Taking generic levothyroxine.  Due for labs.  Wt stable.  Wt Readings from Last 2 Encounters: "   02/15/18 89.4 kg (197 lb 3.2 oz)   12/12/17 88.3 kg (194 lb 11.2 oz)     Family history is positive for mother with hypothyroidism.  No family history of thyroid cancer.  No personal history of radiation exposure to the head or neck.  She has a history of Chron's disease and has been treated with Remicade for many years.  She is a nurse.        PMH/PSH:  Past Medical History:   Diagnosis Date     Cancer (H) 12/11/15    thyroid     Crohn's disease (H)      Depression      Endometriosis      GERD (gastroesophageal reflux disease)      Personal history of other genital system and obstetric disorders(V13.29)      Regional enteritis of small intestine (H) 2003    ileal disease     Thyroid disease     hypo     Past Surgical History:   Procedure Laterality Date     C NONSPECIFIC PROCEDURE  2000    s/p cholecystectomy     CHOLECYSTECTOMY       ENT SURGERY  12/2015    total thyroidectomy     THYROIDECTOMY N/A 12/11/2015    Procedure: THYROIDECTOMY;  Surgeon: Shari King MD;  Location: [facility] OR     Family Hx:  Family History   Problem Relation Age of Onset     Hypertension Mother      Cervical Cancer Mother      GASTROINTESTINAL DISEASE Mother      diverticulitis     Cardiovascular Mother      Hypothyroidism Mother      Coronary Artery Disease Mother      Hypertension Father      DIABETES Paternal Grandmother      CEREBROVASCULAR DISEASE Paternal Grandmother      Thyroid disease: Yes, mother         DM2: No         Autoimmune: DM1, SLE, RA, Vitiligo:  No    Social Hx:  Social History     Social History     Marital status:      Spouse name: N/A     Number of children: 2     Years of education: N/A     Occupational History     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     Social History Main Topics     Smoking status: Never Smoker     Smokeless tobacco: Never Used     Alcohol use 0.0 oz/week     0 Standard drinks or equivalent per week      Comment: once or twice a month     Drug use: No     Sexual  "activity: Yes     Partners: Male     Birth control/ protection: Surgical, Pill      Comment: Vasectomy     Other Topics Concern     Not on file     Social History Narrative    Older son, Atul,  , with developmental delay          MEDICATIONS:  has a current medication list which includes the following prescription(s): levothyroxine, budesonide-formoterol, buspirone hcl, bupropion hcl er (xl), drospirenone-ethinyl estradiol, fluoxetine, trazodone, propranolol, infliximab, lansoprazole, guaifenesin-codeine, and albuterol.    Review of Systems  10 point ROS neg other than the symptoms noted above in the HPI.    Physical Exam   VS: /70 (BP Location: Left arm, Patient Position: Chair, Cuff Size: Adult Large)  Pulse 77  Temp 98.1  F (36.7  C) (Oral)  Ht 1.549 m (5' 1\")  Wt 89.4 kg (197 lb 3.2 oz)  SpO2 96%  BMI 37.26 kg/m2  GENERAL: NAD, well dressed, answering questions appropriately, appears stated age.  HEENT: OP clear, no exophthalmos, no proptosis, EOMI, no lig lag, no retraction, no scleral icterus  NECK:  Supply, thyroid bed palpably empty, no lymphadenopathy, well healed horizontal scar across the lower neck.  RESPIRATORY: Normal respiratory effort.  CARDIOVASCULAR: No peripheral edema.  PSYCH: Intact judgment and insight. A&OX3 with a cordial affect.    LABS:  TG:  TG ( suppressed ) : 1.3 ( 16), TSH 7.83  TG ( stimulated ) : 2.7 ( 2/3/16), TSH 1.28    2016:  TSH: 1.28  JUAN: <0.4  TG: <0.1    2016:  TSH: 0.62  JUAN: <0.4  TG: <0.1    TFTs:  ENDO THYROID LABS-[facility] Latest Ref Rng & Units 2017 6/15/2017   TSH 0.40 - 4.00 mU/L 0.02 (L) 0.27 (L)   T4 FREE 0.76 - 1.46 ng/dL 1.58 (H) 1.34   FREE T3 2.3 - 4.2 pg/mL 2.9      ENDO THYROID LABS-[facility] Latest Ref Rng & Units 12/15/2016   TSH 0.40 - 4.00 mU/L 0.62   T4 FREE 0.76 - 1.46 ng/dL 1.32   FREE T3 2.3 - 4.2 pg/mL 2.2 (L)     ENDO THYROID LABS-[facility] Latest Ref Rng 2016   TSH 0.40 - 4.00 mU/L 1.28   T4 FREE 0.76 - 1.46 ng/dL 1.13 "     !THYROID Latest Ref Rng 4/25/2016 1/27/2016 10/14/2015 9/1/2015   TSH 0.40 - 4.00 mU/L 3.74 7.83 (H) 3.79 4.77 (H)   T4 FREE 0.76 - 1.46 ng/dL 1.07 1.10 0.94 0.95     !THYROID Latest Ref Rng 5/22/2015   TSH 0.40 - 4.00 mU/L 5.92 (H)   T4 FREE 0.76 - 1.46 ng/dL 1.52 (H)       FINAL DIAGNOSIS:  A, B, and C.  Specimens: Right thyroid lobe, prelaryngeal tissue, left thyroid  lobe.  Procedure: Total thyroidectomy.  Received: Fresh.  Specimen Integrity: Divided (thyroidectomy performed as  lobectomy and completion thyroidectomy).  Tumor Focality: Four (4) foci of microcarcinomas .  Tumor Laterality: Right lobe (x2) and left lobe (x2).    Tumor Histologic Type (all tumors): Papillary microcarcinoma;  follicular variant, infiltrative.    Tumor Size:  Tumor #1 (right lobe): 0.8 cm.  Tumor #2 (right lobe): 0.6 cm.  Tumor #3 (left lobe): 0.2 cm.  Tumor #4 (left lobe): 0.25 cm.    Margins:  Tumor #1: Involved right anterolateral margin over an area of 0.4  cm.  Tumor #2: Tumor within 0.1 cm of the nearest (right anterolateral)  margin.  Tumor #3: Tumor within fraction of a mm of the left anterolateral  and medial margins.  Tumor #4: Tumor within fraction of a millimeter of the left  anterolateral margin.    Regional Lymph Nodes: No nodes submitted or found.    Pathologic Staging (pTNM): pT1a(m), pNX, pM not applicable.    Additional Pathologic Findings:  -Hyperplastic nodules (largest 2 cm wide; within right lobe).  -Single normal-appearing parathyroid gland identified within left  lobe, mid region.  -Prelaryngeal tissue (specimen C) comprises of benign fibrovascular  tissue without lymph nodes.  Prior biopsy site changes.      NM I-131 THYROID THERAPY  2/3/2016 9:38 AM     HISTORY: Thyroid cancer.     TECHNIQUE: Patient was given 100 mCi I-131orally and instructed in  home care.         IMPRESSION: Successful I-131 therapy administration.       NUCLEAR MEDICINE I-131 WHOLE BODY SCAN February 10, 2016 8:08 AM       HISTORY:  Postprocedural hypothyroidism. Hypocalcemia. Malignant  neoplasm of thyroid gland,       COMPARISON: None.     TECHNIQUE: Patient was given I-131 orally prior to the scan followed  by whole body scan.       DOSE: Seven days previously the patient received 106.6 mCi I-131.     FINDINGS: Physiologic activity noted. No abnormal activity to suggest  metastatic thyroid cancer.           IMPRESSION: No metastatic disease demonstrated.    Follow up Neck US 7/2016:  HEAD AND NECK SOFT TISSUE ULTRASOUND  7/18/2016  2:44 PM       HISTORY: Postprocedural hypothyroidism, Malignant neoplasm of thyroid  gland       FINDINGS: Anterior neck ultrasound shows no residual thyroid tissue.  Multiple bilateral normal-appearing lymph nodes are present. No  enlarged or abnormal-appearing lymph nodes are evident in the right or  left neck.                                                                       IMPRESSION: No enlarged or abnormal-appearing lymph nodes are  appreciated in the right or left neck.    All pertinent notes, labs, and images personally reviewed by me.     A/P  Ms.Heather SERGE Rowell is a 45 year old here for the evaluation of:    1.  PTC:   FNA biopsy of the 3.0 cm right thyroid nodule showed follicular lesion of undetermined significance. 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 No abnormal activity to suggest  metastatic thyroid cancer.  Follow up US 6/2016- no mets  --  TG appear stable  -- get follow up WBS  Discussed precautions and protocol.  The patient indicates understanding of these issues and agrees with the plan.    #2. Postsurgical hypothyroidism:  Currently treated with Levothyroxine 200 mcg daily. Generic.   Clinically looks euthyroid. Goal TSH <1.0  Decrease levothyroxine.  Take 200 mcg X 6 days a week and 100 mcg/day X 1 day a week (Feb 2018)  Labs in 2 months  Please make a lab appointment for blood work and follow up clinic appointment in 1 week after that to discuss results.      Follow-up:  After WBS    Alice Donis MD  Endocrinology   [facility]    Addendum to above note and clinic visit:    Labs reviewed.    See result note/telephone encounter.                Again, thank you for allowing me to participate in the care of your patient.        Sincerely,        Alice Donis MD

## 2018-02-15 NOTE — PROGRESS NOTES
"Name: Meeta Rowell  F/u for postsurgical hypothyroidism and PTC. IRINA  and Nancy Garcia.  HPI:  Meeta Rowell is a 47 year old female with:    1.  Thyroid cancer:  She initially presented for the evaluation of thyroid nodule, first noted in 2013.  She was previously seen for this by Dr. Berry and recalls FNA biopsy which was \"benign\".  Then  she noted enlarged neck lymph nodes.  Repeat FNA biopsy of the 3.0 cm right thyroid nodule showed follicular lesion of undetermined significance. She underwent total thyroidectomy 12/11/2015 after Frozen section confirmed a follicular lesion and a small papillary carcinoma of the thyroid .  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 No abnormal activity to suggest  metastatic thyroid cancer.  Follow-up neck ultrasound 7/2016: No enlarged or abnormal-appearing lymph nodes are appreciated in the right or left neck.  TG ( suppressed ) : 1.3 ( 1/27/16)  TG ( stimulated ) : 2.7 ( 2/3/16)  In last clinic visit WBS was discussed but was not done.    Postsurgical hypothyroidism:  She was started on Levothyroxine 150 mcg after surgery, dose was increased to 175, then increased to currently dose 200 mcg qd.  S/p menopause.   Taking generic levothyroxine.  Due for labs.  Wt stable.  Wt Readings from Last 2 Encounters:   02/15/18 89.4 kg (197 lb 3.2 oz)   12/12/17 88.3 kg (194 lb 11.2 oz)     Family history is positive for mother with hypothyroidism.  No family history of thyroid cancer.  No personal history of radiation exposure to the head or neck.  She has a history " of Chron's disease and has been treated with Remicade for many years.  She is a nurse.        PMH/PSH:  Past Medical History:   Diagnosis Date     Cancer (H) 12/11/15    thyroid     Crohn's disease (H)      Depression      Endometriosis      GERD (gastroesophageal reflux disease)      Personal history of other genital system and obstetric disorders(V13.29)      Regional enteritis of small intestine (H)     ileal disease     Thyroid disease     hypo     Past Surgical History:   Procedure Laterality Date     C NONSPECIFIC PROCEDURE      s/p cholecystectomy     CHOLECYSTECTOMY       ENT SURGERY  2015    total thyroidectomy     THYROIDECTOMY N/A 2015    Procedure: THYROIDECTOMY;  Surgeon: Shari King MD;  Location: [facility] OR     Family Hx:  Family History   Problem Relation Age of Onset     Hypertension Mother      Cervical Cancer Mother      GASTROINTESTINAL DISEASE Mother      diverticulitis     Cardiovascular Mother      Hypothyroidism Mother      Coronary Artery Disease Mother      Hypertension Father      DIABETES Paternal Grandmother      CEREBROVASCULAR DISEASE Paternal Grandmother      Thyroid disease: Yes, mother         DM2: No         Autoimmune: DM1, SLE, RA, Vitiligo:  No    Social Hx:  Social History     Social History     Marital status:      Spouse name: N/A     Number of children: 2     Years of education: N/A     Occupational History     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     Social History Main Topics     Smoking status: Never Smoker     Smokeless tobacco: Never Used     Alcohol use 0.0 oz/week     0 Standard drinks or equivalent per week      Comment: once or twice a month     Drug use: No     Sexual activity: Yes     Partners: Male     Birth control/ protection: Surgical, Pill      Comment: Vasectomy     Other Topics Concern     Not on file     Social History Narrative    Older son, Atul,  , with developmental delay          MEDICATIONS:  has a  "current medication list which includes the following prescription(s): levothyroxine, budesonide-formoterol, buspirone hcl, bupropion hcl er (xl), drospirenone-ethinyl estradiol, fluoxetine, trazodone, propranolol, infliximab, lansoprazole, guaifenesin-codeine, and albuterol.    Review of Systems  10 point ROS neg other than the symptoms noted above in the HPI.    Physical Exam   VS: /70 (BP Location: Left arm, Patient Position: Chair, Cuff Size: Adult Large)  Pulse 77  Temp 98.1  F (36.7  C) (Oral)  Ht 1.549 m (5' 1\")  Wt 89.4 kg (197 lb 3.2 oz)  SpO2 96%  BMI 37.26 kg/m2  GENERAL: NAD, well dressed, answering questions appropriately, appears stated age.  HEENT: OP clear, no exophthalmos, no proptosis, EOMI, no lig lag, no retraction, no scleral icterus  NECK:  Supply, thyroid bed palpably empty, no lymphadenopathy, well healed horizontal scar across the lower neck.  RESPIRATORY: Normal respiratory effort.  CARDIOVASCULAR: No peripheral edema.  PSYCH: Intact judgment and insight. A&OX3 with a cordial affect.    LABS:  TG:  TG ( suppressed ) : 1.3 ( 1/27/16), TSH 7.83  TG ( stimulated ) : 2.7 ( 2/3/16), TSH 1.28    6/2016:  TSH: 1.28  JUAN: <0.4  TG: <0.1    12/2016:  TSH: 0.62  JUAN: <0.4  TG: <0.1    TFTs:  ENDO THYROID LABS-[facility] Latest Ref Rng & Units 12/28/2017 6/15/2017   TSH 0.40 - 4.00 mU/L 0.02 (L) 0.27 (L)   T4 FREE 0.76 - 1.46 ng/dL 1.58 (H) 1.34   FREE T3 2.3 - 4.2 pg/mL 2.9      ENDO THYROID LABS-[facility] Latest Ref Rng & Units 12/15/2016   TSH 0.40 - 4.00 mU/L 0.62   T4 FREE 0.76 - 1.46 ng/dL 1.32   FREE T3 2.3 - 4.2 pg/mL 2.2 (L)     ENDO THYROID LABS-[facility] Latest Ref Rng 6/28/2016   TSH 0.40 - 4.00 mU/L 1.28   T4 FREE 0.76 - 1.46 ng/dL 1.13     !THYROID Latest Ref Rng 4/25/2016 1/27/2016 10/14/2015 9/1/2015   TSH 0.40 - 4.00 mU/L 3.74 7.83 (H) 3.79 4.77 (H)   T4 FREE 0.76 - 1.46 ng/dL 1.07 1.10 0.94 0.95     !THYROID Latest Ref Rng 5/22/2015   TSH 0.40 - 4.00 mU/L 5.92 (H)   T4 FREE 0.76 - 1.46 ng/dL " 1.52 (H)       FINAL DIAGNOSIS:  A, B, and C.  Specimens: Right thyroid lobe, prelaryngeal tissue, left thyroid  lobe.  Procedure: Total thyroidectomy.  Received: Fresh.  Specimen Integrity: Divided (thyroidectomy performed as  lobectomy and completion thyroidectomy).  Tumor Focality: Four (4) foci of microcarcinomas .  Tumor Laterality: Right lobe (x2) and left lobe (x2).    Tumor Histologic Type (all tumors): Papillary microcarcinoma;  follicular variant, infiltrative.    Tumor Size:  Tumor #1 (right lobe): 0.8 cm.  Tumor #2 (right lobe): 0.6 cm.  Tumor #3 (left lobe): 0.2 cm.  Tumor #4 (left lobe): 0.25 cm.    Margins:  Tumor #1: Involved right anterolateral margin over an area of 0.4  cm.  Tumor #2: Tumor within 0.1 cm of the nearest (right anterolateral)  margin.  Tumor #3: Tumor within fraction of a mm of the left anterolateral  and medial margins.  Tumor #4: Tumor within fraction of a millimeter of the left  anterolateral margin.    Regional Lymph Nodes: No nodes submitted or found.    Pathologic Staging (pTNM): pT1a(m), pNX, pM not applicable.    Additional Pathologic Findings:  -Hyperplastic nodules (largest 2 cm wide; within right lobe).  -Single normal-appearing parathyroid gland identified within left  lobe, mid region.  -Prelaryngeal tissue (specimen C) comprises of benign fibrovascular  tissue without lymph nodes.  Prior biopsy site changes.      NM I-131 THYROID THERAPY  2/3/2016 9:38 AM     HISTORY: Thyroid cancer.     TECHNIQUE: Patient was given 100 mCi I-131orally and instructed in  home care.         IMPRESSION: Successful I-131 therapy administration.       NUCLEAR MEDICINE I-131 WHOLE BODY SCAN February 10, 2016 8:08 AM       HISTORY: Postprocedural hypothyroidism. Hypocalcemia. Malignant  neoplasm of thyroid gland,       COMPARISON: None.     TECHNIQUE: Patient was given I-131 orally prior to the scan followed  by whole body scan.       DOSE: Seven days previously the patient received 106.6  mCi I-131.     FINDINGS: Physiologic activity noted. No abnormal activity to suggest  metastatic thyroid cancer.           IMPRESSION: No metastatic disease demonstrated.    Follow up Neck US 7/2016:  HEAD AND NECK SOFT TISSUE ULTRASOUND  7/18/2016  2:44 PM       HISTORY: Postprocedural hypothyroidism, Malignant neoplasm of thyroid  gland       FINDINGS: Anterior neck ultrasound shows no residual thyroid tissue.  Multiple bilateral normal-appearing lymph nodes are present. No  enlarged or abnormal-appearing lymph nodes are evident in the right or  left neck.                                                                       IMPRESSION: No enlarged or abnormal-appearing lymph nodes are  appreciated in the right or left neck.    All pertinent notes, labs, and images personally reviewed by me.     A/P  Ms.Heather SERGE Rowell is a 45 year old here for the evaluation of:    1.  PTC:   FNA biopsy of the 3.0 cm right thyroid nodule showed follicular lesion of undetermined significance. 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 No abnormal activity to suggest  metastatic thyroid cancer.  Follow up US 6/2016- no mets  -- TG appear stable  -- get follow up WBS  Discussed precautions and protocol.  The patient indicates understanding of these issues and agrees with the plan.    #2. Postsurgical hypothyroidism:  Currently treated with Levothyroxine 200 mcg daily. Generic.    Clinically looks euthyroid. Goal TSH <1.0  Decrease levothyroxine.  Take 200 mcg X 6 days a week and 100 mcg/day X 1 day a week (Feb 2018)  Labs in 2 months  Please make a lab appointment for blood work and follow up clinic appointment in 1 week after that to discuss results.      Follow-up:  After WBS    Alice Donis MD  Endocrinology   [facility]/[place]    Addendum to above note and clinic visit:    Labs reviewed.    See result note/telephone encounter.

## 2018-02-15 NOTE — PATIENT INSTRUCTIONS
[facility] & [place] locations   Dr Donis, Endocrinology Department      [facility]   [address]  [place] [address]  Appointment [phone]  Fax: [phone]  [place]: Monday and Tuesday         [facility]   [address]  [place] [address]  Appointment [phone]  Fax: [phone]  [place]: Wednesday and Thursday          Decrease levothyroxine.  Take 200 mcg X 6 days a week and 100 mcg/day X 1 day a week.  Labs in 2 months  Please make a lab appointment for blood work and follow up clinic appointment in 1 week after that to discuss results.    Whole Body Radioiodine Scan - Thyrogen 2 Dose: APTIÑO I 131    Start low iodine diet 2 weeks to 10 days before procedure    Day #1 Thyrogen 0.9 mg IM  __________    Day #2 Thyrogen 0.9 mg IM  __________  (serum Pregnancy for women)    Day #3   - labs in AM  - whole body scan    Day # 11  Lab work _____________  - lab work (TG, TGAb, TSH)     Thyrogen injection and Radioactive iodine treatment will be done at [facility]. (Radiology)     [facility] radiology scheduleing  [phone]       Please call and schedule the recommended test as above protocol as discussed in clinic visit.    Restrictions:  Foods to Avoid 10 days prior to test:  - iodized salt  - fish and sea food  - white bread  - cheese  - mayonasise  - cheese cake  - kelp/sea weed  - naan bread  - yorkshire pudding

## 2018-04-13 ENCOUNTER — HOSPITAL ENCOUNTER (OUTPATIENT)
Age: 48
End: 2018-04-13
Admitting: INTERNAL MEDICINE
Payer: COMMERCIAL

## 2018-04-25 DIAGNOSIS — F51.04 PSYCHOPHYSIOLOGICAL INSOMNIA: ICD-10-CM

## 2018-04-25 NOTE — TELEPHONE ENCOUNTER
"Requested Prescriptions   Pending Prescriptions Disp Refills     traZODone (DESYREL) 100 MG tablet [Pharmacy Med Name: TRAZODONE HCL 100MG TABS]    Last Written Prescription Date:  12/12/2017  Last Fill Quantity: 120,  # refills: 3   Last office visit: 12/12/2017 with prescribing provider:  Jesenia Weinberg     Future Office Visit:     120 tablet 3     Sig: TAKE 1 TO 4 TABLETS BY MOUTH AT BEDTIME    Serotonin Modulators Passed    4/25/2018  3:07 PM       Passed - Recent (12 mo) or future (30 days) visit within the authorizing provider's specialty    Patient had office visit in the last 12 months or has a visit in the next 30 days with authorizing provider or within the authorizing provider's specialty.  See \"Patient Info\" tab in inbasket, or \"Choose Columns\" in Meds & Orders section of the refill encounter.           Passed - Patient is age 18 or older       Passed - No active pregnancy on record       Passed - No positive pregnancy test in past 12 months          "

## 2018-04-27 ENCOUNTER — MYC REFILL (OUTPATIENT)
Dept: PEDIATRICS | Facility: CLINIC | Age: 48
End: 2018-04-27

## 2018-04-27 DIAGNOSIS — F51.04 PSYCHOPHYSIOLOGICAL INSOMNIA: ICD-10-CM

## 2018-04-27 RX ORDER — TRAZODONE HYDROCHLORIDE 100 MG/1
100-400 TABLET ORAL AT BEDTIME
Qty: 120 TABLET | Refills: 3 | Status: ON HOLD | OUTPATIENT
Start: 2018-04-27 | End: 2018-08-13

## 2018-04-27 NOTE — TELEPHONE ENCOUNTER
Prescription approved per [facility] Refill Protocol.    LOV: 12/12/17

## 2018-04-27 NOTE — TELEPHONE ENCOUNTER
Message from MyChart:  Original authorizing provider: MD Meeta Rodgers would like a refill of the following medications:  traZODone (DESYREL) 100 MG tablet [Jesenia Weinberg MD]    Preferred pharmacy: [facility] [address] [address]    Comment:  Could you please renew this Rx for me asap? Thank you

## 2018-05-01 RX ORDER — TRAZODONE HYDROCHLORIDE 100 MG/1
TABLET ORAL
Qty: 120 TABLET | Refills: 2 | Status: SHIPPED | OUTPATIENT
Start: 2018-05-01 | End: 2018-08-31

## 2018-05-01 NOTE — TELEPHONE ENCOUNTER
Prescription approved per [facility] Refill Protocol.    Chanelle Espitia RN   [facility]

## 2018-05-25 ENCOUNTER — TRANSFERRED RECORDS (OUTPATIENT)
Dept: HEALTH INFORMATION MANAGEMENT | Facility: CLINIC | Age: 48
End: 2018-05-25

## 2018-07-05 ENCOUNTER — MYC MEDICAL ADVICE (OUTPATIENT)
Dept: ENDOCRINOLOGY | Facility: CLINIC | Age: 48
End: 2018-07-05

## 2018-07-05 DIAGNOSIS — E89.0 S/P TOTAL THYROIDECTOMY: ICD-10-CM

## 2018-07-05 DIAGNOSIS — C73 THYROID CANCER (H): ICD-10-CM

## 2018-07-05 RX ORDER — LEVOTHYROXINE SODIUM 200 UG/1
TABLET ORAL
Qty: 30 TABLET | Refills: 1 | Status: SHIPPED | OUTPATIENT
Start: 2018-07-05 | End: 2018-08-22

## 2018-07-05 NOTE — TELEPHONE ENCOUNTER
"Requested Prescriptions   Pending Prescriptions Disp Refills     levothyroxine (SYNTHROID/LEVOTHROID) 200 MCG tablet 30 tablet 3     Sig: Take 200 mcg X 6 days a week and 100 mcg/day X 1 day a week.    Thyroid Protocol Failed    7/5/2018  1:08 PM       Failed - Normal TSH on file in past 12 months    Recent Labs   Lab Test  12/28/17   1525   TSH  0.02*             Passed - Patient is 12 years or older       Passed - Recent (12 mo) or future (30 days) visit within the authorizing provider's specialty    Patient had office visit in the last 12 months or has a visit in the next 30 days with authorizing provider or within the authorizing provider's specialty.  See \"Patient Info\" tab in inbasket, or \"Choose Columns\" in Meds & Orders section of the refill encounter.           Passed - No active pregnancy on record    If patient is pregnant or has had a positive pregnancy test, please check TSH.         Passed - No positive pregnancy test in past 12 months    If patient is pregnant or has had a positive pregnancy test, please check TSH.            Medication is being filled for 2 times only due to:  pt has a future appt scheduled.    "

## 2018-07-20 ENCOUNTER — MEDICAL CORRESPONDENCE (OUTPATIENT)
Dept: HEALTH INFORMATION MANAGEMENT | Facility: CLINIC | Age: 48
End: 2018-07-20

## 2018-07-20 DIAGNOSIS — F41.1 GENERALIZED ANXIETY DISORDER: ICD-10-CM

## 2018-07-20 NOTE — TELEPHONE ENCOUNTER
"Requested Prescriptions   Pending Prescriptions Disp Refills     propranolol (INDERAL LA) 60 MG 24 hr capsule [Pharmacy Med Name: PROPRANOLOL HCL ER 60MG CP24]    Last Written Prescription Date:  7/12/2017  Last Fill Quantity: 90,  # refills: 3   Last office visit: 4/26/2017 with prescribing provider:  Jesenia Weinberg     Future Office Visit:   Next 5 appointments (look out 90 days)     Aug 22, 2018  2:30 PM CDT   Return Visit with SHANE Hill CNP   [facility]    [address]  [facility] [address]   [phone]                  90 capsule 3     Sig: TAKE ONE CAPSULE BY MOUTH EVERY DAY    Beta-Blockers Protocol Passed    7/20/2018 12:03 AM       Passed - Blood pressure under 140/90 in past 12 months    BP Readings from Last 3 Encounters:   02/15/18 110/70   12/12/17 100/70   12/02/17 110/79                Passed - Patient is age 6 or older       Passed - Recent (12 mo) or future (30 days) visit within the authorizing provider's specialty    Patient had office visit in the last 12 months or has a visit in the next 30 days with authorizing provider or within the authorizing provider's specialty.  See \"Patient Info\" tab in inbasket, or \"Choose Columns\" in Meds & Orders section of the refill encounter.              "

## 2018-07-22 ENCOUNTER — MYC REFILL (OUTPATIENT)
Dept: PEDIATRICS | Facility: CLINIC | Age: 48
End: 2018-07-22

## 2018-07-22 DIAGNOSIS — F41.1 GENERALIZED ANXIETY DISORDER: ICD-10-CM

## 2018-07-24 RX ORDER — PROPRANOLOL HCL 60 MG
60 CAPSULE, EXTENDED RELEASE 24HR ORAL DAILY
Qty: 90 CAPSULE | Refills: 3 | Status: ON HOLD | OUTPATIENT
Start: 2018-07-24 | End: 2018-08-13

## 2018-07-24 NOTE — TELEPHONE ENCOUNTER
Message from [employer]:  Original authorizing provider: MD Meeta Rodgers would like a refill of the following medications:  propranolol (INDERAL LA) 60 MG 24 hr capsule [Jesenia Weinberg MD]    Preferred pharmacy: [place], [place] - [address] [address]    Comment:

## 2018-07-24 NOTE — TELEPHONE ENCOUNTER
Pt takes this med for anxiety. Diagnosis is not in RN refill protocol. Sending to pcp.     Propranolol 60 mg qd  Last Written Prescription Date:  7/12/17  Last Fill Quantity: 90,  # refills: 3   Last office visit: 12/12/2017 with prescribing provider:  12/12/17   Future Office Visit:   Next 5 appointments (look out 90 days)     Aug 22, 2018  2:30 PM CDT   Return Visit with SHANE Hill CNP   [facility]    [address]  [facility] [id number] [phone]                 Vinh, RN  Triage Nurse

## 2018-07-25 RX ORDER — PROPRANOLOL HCL 60 MG
CAPSULE, EXTENDED RELEASE 24HR ORAL
Qty: 90 CAPSULE | Refills: 1 | Status: SHIPPED | OUTPATIENT
Start: 2018-07-25 | End: 2020-04-28

## 2018-07-25 NOTE — TELEPHONE ENCOUNTER
Prescription approved per [facility] Refill Protocol.  Corinne Perez RN

## 2018-07-28 ENCOUNTER — TELEPHONE (OUTPATIENT)
Dept: PEDIATRICS | Facility: CLINIC | Age: 48
End: 2018-07-28

## 2018-08-07 ENCOUNTER — MYC REFILL (OUTPATIENT)
Dept: ENDOCRINOLOGY | Facility: CLINIC | Age: 48
End: 2018-08-07

## 2018-08-07 DIAGNOSIS — E89.0 S/P TOTAL THYROIDECTOMY: ICD-10-CM

## 2018-08-07 DIAGNOSIS — C73 THYROID CANCER (H): ICD-10-CM

## 2018-08-07 RX ORDER — LEVOTHYROXINE SODIUM 200 UG/1
TABLET ORAL
Qty: 30 TABLET | Refills: 1 | Status: CANCELLED | OUTPATIENT
Start: 2018-08-07

## 2018-08-08 ENCOUNTER — MYC REFILL (OUTPATIENT)
Dept: ENDOCRINOLOGY | Facility: CLINIC | Age: 48
End: 2018-08-08

## 2018-08-08 DIAGNOSIS — E89.0 POSTSURGICAL HYPOTHYROIDISM: ICD-10-CM

## 2018-08-08 DIAGNOSIS — C73 THYROID CANCER (H): ICD-10-CM

## 2018-08-08 DIAGNOSIS — E89.0 S/P TOTAL THYROIDECTOMY: ICD-10-CM

## 2018-08-08 PROCEDURE — 84439 ASSAY OF FREE THYROXINE: CPT | Performed by: INTERNAL MEDICINE

## 2018-08-08 PROCEDURE — 99000 SPECIMEN HANDLING OFFICE-LAB: CPT | Performed by: INTERNAL MEDICINE

## 2018-08-08 PROCEDURE — 84443 ASSAY THYROID STIM HORMONE: CPT | Performed by: INTERNAL MEDICINE

## 2018-08-08 PROCEDURE — 86800 THYROGLOBULIN ANTIBODY: CPT | Mod: 90 | Performed by: INTERNAL MEDICINE

## 2018-08-08 PROCEDURE — 84432 ASSAY OF THYROGLOBULIN: CPT | Mod: 90 | Performed by: INTERNAL MEDICINE

## 2018-08-08 PROCEDURE — 36415 COLL VENOUS BLD VENIPUNCTURE: CPT | Performed by: INTERNAL MEDICINE

## 2018-08-08 PROCEDURE — 00000344 ZZHCL STATISTIC REMEASURE THYROGLOBULIN: Mod: 90 | Performed by: INTERNAL MEDICINE

## 2018-08-08 RX ORDER — LEVOTHYROXINE SODIUM 200 UG/1
TABLET ORAL
Qty: 30 TABLET | Refills: 0 | OUTPATIENT
Start: 2018-08-08

## 2018-08-08 RX ORDER — LEVOTHYROXINE SODIUM 200 UG/1
TABLET ORAL
Qty: 30 TABLET | Refills: 1 | Status: CANCELLED | OUTPATIENT
Start: 2018-08-08

## 2018-08-08 NOTE — TELEPHONE ENCOUNTER
Message from [employer]:  Original authorizing provider: MD Meeta Barton would like a refill of the following medications:  levothyroxine (SYNTHROID/LEVOTHROID) 200 MCG tablet [Alice Donis MD]    Preferred pharmacy: [place], [place] - [address] [address]    Comment:  Could I please have 1 more refill, I have an appointment scheduled for Aug 22nd with Nancy Garcia. Thank you. Meeta Rowell

## 2018-08-08 NOTE — TELEPHONE ENCOUNTER
"    Requested Prescriptions   Pending Prescriptions Disp Refills     levothyroxine (SYNTHROID/LEVOTHROID) 200 MCG tablet [Pharmacy Med Name: LEVOTHYROXINE SODIUM 200MCG TABS] 30 tablet 0     Sig: TAKE 1 TABLET (200MCG) BY MOUTH 6 DAYS A WEEK, AND TAKE 1/2 TABLET (100MCG) 1 DAY A WEEK    Thyroid Protocol Failed    8/8/2018  1:43 PM       Failed - Normal TSH on file in past 12 months    Recent Labs   Lab Test  12/28/17   1525   TSH  0.02*             Passed - Patient is 12 years or older       Passed - Recent (12 mo) or future (30 days) visit within the authorizing provider's specialty    Patient had office visit in the last 12 months or has a visit in the next 30 days with authorizing provider or within the authorizing provider's specialty.  See \"Patient Info\" tab in inbasket, or \"Choose Columns\" in Meds & Orders section of the refill encounter.           Passed - No active pregnancy on record    If patient is pregnant or has had a positive pregnancy test, please check TSH.         Passed - No positive pregnancy test in past 12 months    If patient is pregnant or has had a positive pregnancy test, please check TSH.            "

## 2018-08-08 NOTE — TELEPHONE ENCOUNTER
Message from [employer]:  Original authorizing provider: MD Leighton Bartonher VALENTINERomán Beales would like a refill of the following medications:  levothyroxine (SYNTHROID/LEVOTHROID) 200 MCG tablet [Alice Donis MD]    Preferred pharmacy: [place], [place] - [address] [address]    Comment:  Hello, I tried to refill this with my pharmacy and there are no refills left. Could you please refill for 1 more month so I can have enough until my appointment on 8/22. Thank you

## 2018-08-09 LAB
T4 FREE SERPL-MCNC: 1.25 NG/DL (ref 0.76–1.46)
TSH SERPL DL<=0.005 MIU/L-ACNC: 0.23 MU/L (ref 0.4–4)

## 2018-08-13 ENCOUNTER — HOSPITAL ENCOUNTER (OUTPATIENT)
Facility: CLINIC | Age: 48
Discharge: HOME | End: 2018-08-13
Attending: INTERNAL MEDICINE | Admitting: INTERNAL MEDICINE
Payer: COMMERCIAL

## 2018-08-13 ENCOUNTER — HOSPITAL ENCOUNTER (OUTPATIENT)
Dept: NUCLEAR MEDICINE | Facility: CLINIC | Age: 48
Setting detail: NUCLEAR MEDICINE
End: 2018-08-13
Attending: INTERNAL MEDICINE
Payer: COMMERCIAL

## 2018-08-13 VITALS
RESPIRATION RATE: 18 BRPM | HEART RATE: 61 BPM | DIASTOLIC BLOOD PRESSURE: 67 MMHG | OXYGEN SATURATION: 98 % | SYSTOLIC BLOOD PRESSURE: 124 MMHG

## 2018-08-13 DIAGNOSIS — E89.0 POSTSURGICAL HYPOTHYROIDISM: ICD-10-CM

## 2018-08-13 DIAGNOSIS — C73 THYROID CANCER (H): ICD-10-CM

## 2018-08-13 DIAGNOSIS — E89.0 S/P TOTAL THYROIDECTOMY: ICD-10-CM

## 2018-08-13 LAB — B-HCG SERPL-ACNC: <1 IU/L (ref 0–5)

## 2018-08-13 PROCEDURE — 25000128 H RX IP 250 OP 636: Performed by: INTERNAL MEDICINE

## 2018-08-13 PROCEDURE — 36415 COLL VENOUS BLD VENIPUNCTURE: CPT | Performed by: INTERNAL MEDICINE

## 2018-08-13 PROCEDURE — 96372 THER/PROPH/DIAG INJ SC/IM: CPT

## 2018-08-13 PROCEDURE — 84702 CHORIONIC GONADOTROPIN TEST: CPT | Performed by: INTERNAL MEDICINE

## 2018-08-13 PROCEDURE — 40000863 ZZH STATISTIC RADIOLOGY XRAY, US, CT, MAR, NM

## 2018-08-13 RX ADMIN — THYROTROPIN ALFA 0.9 MG: KIT at 13:08

## 2018-08-13 NOTE — IP AVS SNAPSHOT
MRN:[id number]                      After Visit Summary   8/13/2018    Meeta Rowell    MRN: [id number]           Visit Information        Department      8/13/2018 10:54 AM [facility]          Review of your medicines      UNREVIEWED medicines. Ask your doctor about these medicines        Dose / Directions    albuterol 108 (90 Base) MCG/ACT inhaler   Commonly known as:  PROAIR HFA/PROVENTIL HFA/VENTOLIN HFA   Used for:  Cough, SOB (shortness of breath)        Dose:  2 puff   Inhale 2 puffs into the lungs every 6 hours as needed for shortness of breath / dyspnea or wheezing   Quantity:  1 Inhaler   Refills:  0       budesonide-formoterol 160-4.5 MCG/ACT Inhaler   Commonly known as:  SYMBICORT   Used for:  Acute bronchitis, unspecified organism        Dose:  2 puff   Inhale 2 puffs into the lungs 2 times daily   Quantity:  1 Inhaler   Refills:  1       BuPROPion HCl ER (XL) 450 MG Tb24   Used for:  Moderate recurrent major depression (H)        Dose:  450 mg   Take 450 mg by mouth every morning Fill at Patient request. Can substitute one 150 mg and one 300 mg if needed.   Quantity:  90 tablet   Refills:  3       BusPIRone HCl 30 MG Tabs   Used for:  Major depressive disorder, recurrent episode, moderate (H)        Dose:  30 mg   Take 30 mg by mouth 2 times daily   Quantity:  180 tablet   Refills:  3       drospirenone-ethinyl estradiol 3-0.03 MG per tablet   Commonly known as:  KANA 28   Used for:  Encounter for surveillance of contraceptive pills        Dose:  1 tablet   Take 1 tablet by mouth daily Take active pills daily   Quantity:  112 tablet   Refills:  4       FLUoxetine 40 MG capsule   Commonly known as:  PROzac   Used for:  Moderate recurrent major depression (H)        Dose:  40 mg   Take 1 capsule (40 mg) by mouth daily   Quantity:  90 capsule   Refills:  3       guaiFENesin-codeine 100-10 MG/5ML Soln solution   Commonly known as:  ROBITUSSIN AC   Used for:  Acute  bronchitis, unspecified organism        Dose:  1 tsp.   Take 5 mLs by mouth every 4 hours as needed for cough   Quantity:  120 mL   Refills:  0       LANsoprazole 30 MG CR capsule   Commonly known as:  PREVACID        Dose:  30 mg   Take 30 mg by mouth daily   Refills:  0       levothyroxine 200 MCG tablet   Commonly known as:  SYNTHROID/LEVOTHROID   Used for:  Thyroid cancer (H), S/P total thyroidectomy        Take 200 mcg X 6 days a week and 100 mcg/day X 1 day a week.   Quantity:  30 tablet   Refills:  1       * propranolol 60 MG 24 hr capsule   Commonly known as:  INDERAL LA   Used for:  Generalized anxiety disorder        Dose:  60 mg   Take 1 capsule (60 mg) by mouth daily   Quantity:  90 capsule   Refills:  3       * propranolol 60 MG 24 hr capsule   Commonly known as:  INDERAL LA   Used for:  Generalized anxiety disorder        TAKE ONE CAPSULE BY MOUTH EVERY DAY   Quantity:  90 capsule   Refills:  1       REMICADE 100 MG injection   Generic drug:  inFLIXimab        Dose:  3 mg/kg   Inject 3 mg/kg into the vein   Refills:  0       * traZODone 100 MG tablet   Commonly known as:  DESYREL   Used for:  Psychophysiological insomnia        Dose:  100-400 mg   Take 1-4 tablets (100-400 mg) by mouth At Bedtime   Quantity:  120 tablet   Refills:  3       * traZODone 100 MG tablet   Commonly known as:  DESYREL   Used for:  Psychophysiological insomnia        TAKE 1 TO 4 TABLETS BY MOUTH AT BEDTIME   Quantity:  120 tablet   Refills:  2       * Notice:  This list has 4 medication(s) that are the same as other medications prescribed for you. Read the directions carefully, and ask your doctor or other care provider to review them with you.             Protect others around you: Learn how to safely use, store and throw away your medicines at www.disposemymeds.org.         Follow-ups after your visit        Your next 10 appointments already scheduled     Aug 14, 2018 11:00 AM CDT   NM INJECTION THYROGEN with SHNMINJ    [facility] ([facility])    [address] [address]   [phone]           Please bring a list of your medicines to the exam. (Include vitamins, minerals and over-the-counter drugs.) You should wear comfortable clothes. Leave your valuables at home. Please bring related prior results and films.  Tell your doctor:   If you are breastfeeding or may be pregnant.   If you have had a test including barium within the past 48 hours. Barium may change the results of certain exams.   If you think you may need sedation (medicine to help you relax).  This exam cannot be performed if you have had another imaging test including iodinated contrast within the past 30 days.  You may eat and drink as normal.  If you take thyroid medicine, you must talk to your doctor about stopping it. Your doctor may have you stop taking it 3 to 6 weeks before your exam.  Please call your Imaging Department at your exam site with any questions.            Aug 15, 2018 11:00 AM CDT   NM INJECT with SHNMINJ   [facility] ([facility])    [address] [address]   [phone]            Aug 16, 2018 11:00 AM CDT   NM THYROID WH BDY SCAN I 123 with SHNM1   [facility])    [address] [address] [phone]           Please bring a list of your medicines to the exam. (Include vitamins, minerals and over-the-counter drugs.) You should wear comfortable clothes. Leave your valuables at home. Please bring related prior results and films.  Tell your doctor:   If you are breastfeeding or may be pregnant.   If you have had a test including barium within the past 48 hours. Barium may change the results of certain exams.   If you think you may need sedation (medicine to help you relax).  This exam cannot be performed if you have had another imaging test  including iodinated contrast within the past 30 days.  You may eat and drink as normal.  If you take thyroid medicine, you must talk to your doctor about stopping it. Your doctor may have you stop taking it 3 to 6 weeks before your exam.  Please call your Imaging Department at your exam site with any questions.            Aug 22, 2018  2:30 PM CDT   Return Visit with SHANE Hill CNP   [facility]    [address]  [facility] [address] [phone]               Care Instructions        Further instructions from your care team       Thyrogen Discharge Instructions    Activity:      You may go back to your normal routine    Do not have sex for a week starting today. It is vital that you do not pass radiation to your partner, and that you prevent pregnancy.    Diet:      Stay on your low-iodine diet until after your scan - if you have been instructed to do so by you provider    Medicines:      Keep taking your regular medications     Side Effects:      You may have mild nausea, vomiting, headache, dizziness and/or fatigue    Side effects should be gone within 48 hours after your second dose of thyrogen            Follow Up Appointments:      Follow up with your primary care provider as needed    Call your primary care provider if:      Chills or a fever greater than 101 F (38 C)    Hives, a rash or unusual itching    Problems breathing    Any questions or concerns        If you have questions call:          [facility] Radiology Dept @ [phone]       Additional Information About Your Visit        [employer] Information     [employer] gives you secure access to your electronic health record. If you see a primary care provider, you can also send messages to your care team and make appointments. If you have questions, please call your primary care clinic.  If you do not have a primary care provider, please call [phone] and they will  assist you.        Care EveryWhere ID     This is your Care EveryWhere ID. This could be used by other organizations to access your [facility] medical records  [id number]        Your Vitals Were     Blood Pressure Pulse Respirations Pulse Oximetry          124/67 (BP Location: Right arm) 61 18 98%         Primary Care Provider Office Phone # Fax #    Jesenia Weinberg [phone]      Equal Access to Services     [facility] : Hadii aad ku hadasho Soomaali, waaxda luqadaha, qaybta kaalmada adeegyada, waxay idiin haynicn adeeg carlos lakaylan . So [facility] [phone].    ATENCIÓN: Si habla español, tiene a domingo disposición servicios gratuitos de asistencia lingüística. Llame al [phone].    We comply with applicable federal civil rights laws and [place] laws. We do not discriminate on the basis of race, color, national origin, age, disability, sex, sexual orientation, or gender identity.            Thank you!     Thank you for choosing [facility] for your care. Our goal is always to provide you with excellent care. Hearing back from our patients is one way we can continue to improve our services. Please take a few minutes to complete the written survey that you may receive in the mail after you visit with us. Thank you!             Medication List: This is a list of all your medications and when to take them. Check marks below indicate your daily home schedule. Keep this list as a reference.      Medications           Morning Afternoon Evening Bedtime As Needed    albuterol 108 (90 Base) MCG/ACT inhaler   Commonly known as:  PROAIR HFA/PROVENTIL HFA/VENTOLIN HFA   Inhale 2 puffs into the lungs every 6 hours as needed for shortness of breath / dyspnea or wheezing                                budesonide-formoterol 160-4.5 MCG/ACT Inhaler   Commonly known as:  SYMBICORT   Inhale 2 puffs into the lungs 2 times daily                                BuPROPion HCl ER (XL) 450 MG Tb24   Take 450 mg by mouth  every morning Fill at Patient request. Can substitute one 150 mg and one 300 mg if needed.                                BusPIRone HCl 30 MG Tabs   Take 30 mg by mouth 2 times daily                                drospirenone-ethinyl estradiol 3-0.03 MG per tablet   Commonly known as:  KANA 28   Take 1 tablet by mouth daily Take active pills daily                                FLUoxetine 40 MG capsule   Commonly known as:  PROzac   Take 1 capsule (40 mg) by mouth daily                                guaiFENesin-codeine 100-10 MG/5ML Soln solution   Commonly known as:  ROBITUSSIN AC   Take 5 mLs by mouth every 4 hours as needed for cough                                LANsoprazole 30 MG CR capsule   Commonly known as:  PREVACID   Take 30 mg by mouth daily                                levothyroxine 200 MCG tablet   Commonly known as:  SYNTHROID/LEVOTHROID   Take 200 mcg X 6 days a week and 100 mcg/day X 1 day a week.                                * propranolol 60 MG 24 hr capsule   Commonly known as:  INDERAL LA   Take 1 capsule (60 mg) by mouth daily                                * propranolol 60 MG 24 hr capsule   Commonly known as:  INDERAL LA   TAKE ONE CAPSULE BY MOUTH EVERY DAY                                REMICADE 100 MG injection   Inject 3 mg/kg into the vein   Generic drug:  inFLIXimab                                * traZODone 100 MG tablet   Commonly known as:  DESYREL   Take 1-4 tablets (100-400 mg) by mouth At Bedtime                                * traZODone 100 MG tablet   Commonly known as:  DESYREL   TAKE 1 TO 4 TABLETS BY MOUTH AT BEDTIME                                * Notice:  This list has 4 medication(s) that are the same as other medications prescribed for you. Read the directions carefully, and ask your doctor or other care provider to review them with you.

## 2018-08-13 NOTE — IP AVS SNAPSHOT
[place] [address]    [place] [address]    Phone:  [phone]                                       After Visit Summary   8/13/2018    Meeta Rowell    MRN: [id number]           After Visit Summary Signature Page     I have received my discharge instructions, and my questions have been answered. I have discussed any challenges I see with this plan with the nurse or doctor.    ..........................................................................................................................................  Patient/Patient Representative Signature      ..........................................................................................................................................  Patient Representative Print Name and Relationship to Patient    ..................................................               ................................................  Date                                            Time    ..........................................................................................................................................  Reviewed by Signature/Title    ...................................................              ..............................................  Date                                                            Time

## 2018-08-13 NOTE — DISCHARGE INSTRUCTIONS
Thyrogen Discharge Instructions    Activity:      You may go back to your normal routine    Do not have sex for a week starting today. It is vital that you do not pass radiation to your partner, and that you prevent pregnancy.    Diet:      Stay on your low-iodine diet until after your scan - if you have been instructed to do so by you provider    Medicines:      Keep taking your regular medications     Side Effects:      You may have mild nausea, vomiting, headache, dizziness and/or fatigue    Side effects should be gone within 48 hours after your second dose of thyrogen            Follow Up Appointments:      Follow up with your primary care provider as needed    Call your primary care provider if:      Chills or a fever greater than 101 F (38 C)    Hives, a rash or unusual itching    Problems breathing    Any questions or concerns        If you have questions call:          [facility] @ [phone]

## 2018-08-13 NOTE — PROGRESS NOTES
Care Suites Discharge Summary    Discharge Criteria:   Discharge Criteria met per MD orders: Yes.   Vital signs stable.     Pt demonstrates ability to ambulate safely: Yes.  (See discharge questionnaire for additional information)    Discharge instructions & education:   Discharge instructions reviewed with patient. Patient verbalizes understanding.   Additional patient education provided:  Thyrogen injection home care instructions provided by Amina WHALEY  pre-procedure.    Medications:   Patient will be discharging on new medications- No. Patient verbalizes reason for use, start date, and side effects NA.    Items returned to patient:   Home and hospital acquired medications returned to patient NA   Listed belongings gathered and returned to patient: Yes    Patient discharged to home    Juvenal Blakely

## 2018-08-14 ENCOUNTER — HOSPITAL ENCOUNTER (OUTPATIENT)
Dept: NUCLEAR MEDICINE | Facility: CLINIC | Age: 48
Setting detail: NUCLEAR MEDICINE
End: 2018-08-14
Attending: INTERNAL MEDICINE
Payer: COMMERCIAL

## 2018-08-14 ENCOUNTER — HOSPITAL ENCOUNTER (OUTPATIENT)
Facility: CLINIC | Age: 48
Discharge: HOME | End: 2018-08-14
Attending: INTERNAL MEDICINE | Admitting: INTERNAL MEDICINE
Payer: COMMERCIAL

## 2018-08-14 VITALS
OXYGEN SATURATION: 97 % | TEMPERATURE: 96 F | DIASTOLIC BLOOD PRESSURE: 67 MMHG | RESPIRATION RATE: 14 BRPM | SYSTOLIC BLOOD PRESSURE: 128 MMHG | HEART RATE: 68 BPM

## 2018-08-14 PROCEDURE — 96372 THER/PROPH/DIAG INJ SC/IM: CPT

## 2018-08-14 PROCEDURE — 25000128 H RX IP 250 OP 636: Performed by: INTERNAL MEDICINE

## 2018-08-14 RX ADMIN — THYROTROPIN ALFA 0.9 MG: KIT at 11:17

## 2018-08-14 NOTE — PLAN OF CARE
Pt here for thyrogen injection. R injection site intact from yesterday, no signs of reaction. Injection today given in L ventrogluteal site. VSS, pt denies pain. Declined copy of discharge instructions since she received them yesterday.

## 2018-08-15 ENCOUNTER — HOSPITAL ENCOUNTER (OUTPATIENT)
Dept: NUCLEAR MEDICINE | Facility: CLINIC | Age: 48
Setting detail: NUCLEAR MEDICINE
Discharge: HOME | End: 2018-08-16
Attending: INTERNAL MEDICINE | Admitting: INTERNAL MEDICINE
Payer: COMMERCIAL

## 2018-08-15 LAB — LAB SCANNED RESULT: NORMAL

## 2018-08-15 PROCEDURE — A9509 IODINE I-123 SOD IODIDE MIL: HCPCS | Performed by: INTERNAL MEDICINE

## 2018-08-15 PROCEDURE — 34300033 ZZH RX 343: Performed by: INTERNAL MEDICINE

## 2018-08-15 RX ORDER — SODIUM IODIDE-123 3.7 MBQ
4.5 CAPSULE ORAL ONCE
Status: COMPLETED | OUTPATIENT
Start: 2018-08-15 | End: 2018-08-15

## 2018-08-15 RX ADMIN — Medication 2.2 MCI.: at 11:28

## 2018-08-16 ENCOUNTER — HOSPITAL ENCOUNTER (OUTPATIENT)
Dept: NUCLEAR MEDICINE | Facility: CLINIC | Age: 48
Setting detail: NUCLEAR MEDICINE
End: 2018-08-16
Attending: INTERNAL MEDICINE
Payer: COMMERCIAL

## 2018-08-16 PROCEDURE — 78018 THYROID MET IMAGING BODY: CPT

## 2018-08-16 NOTE — PROGRESS NOTES
Meeta,  Here's a copy of your recent lab results for your records.  We'll discuss these results in detail along with your total body scan results (if available) at your upcoming visit on 8/22.  Nancy Garcia NP  Endocrinology

## 2018-08-18 NOTE — PROGRESS NOTES
Meeta,  Here's a copy of your recent body scan for your records.  We'll discuss this in detail at your follow up visit.  Results are favorable!  I'll see you next week.  Nancy Garcia NP  Endocrinology

## 2018-08-22 ENCOUNTER — OFFICE VISIT (OUTPATIENT)
Dept: ENDOCRINOLOGY | Facility: CLINIC | Age: 48
End: 2018-08-22
Payer: COMMERCIAL

## 2018-08-22 VITALS
HEART RATE: 64 BPM | WEIGHT: 197.6 LBS | BODY MASS INDEX: 37.34 KG/M2 | DIASTOLIC BLOOD PRESSURE: 70 MMHG | TEMPERATURE: 98.2 F | SYSTOLIC BLOOD PRESSURE: 124 MMHG

## 2018-08-22 DIAGNOSIS — E89.0 S/P TOTAL THYROIDECTOMY: ICD-10-CM

## 2018-08-22 DIAGNOSIS — C73 THYROID CANCER (H): ICD-10-CM

## 2018-08-22 PROCEDURE — 99214 OFFICE O/P EST MOD 30 MIN: CPT | Performed by: CLINICAL NURSE SPECIALIST

## 2018-08-22 RX ORDER — LEVOTHYROXINE SODIUM 200 UG/1
TABLET ORAL
Qty: 90 TABLET | Refills: 3 | Status: SHIPPED | OUTPATIENT
Start: 2018-08-22 | End: 2019-04-09

## 2018-08-22 NOTE — LETTER
"    8/22/2018         RE: Meeta Rowell  [address] [place] [address]        Dear Colleague,    Thank you for referring your patient, Meeta Rowell, to the [facility]. Please see a copy of my visit note below.    Name: Meeta Rowell  F/u for postsurgical hypothyroidism and PTC (Last seen 2/15/2018 by Dr. Donis).  HPI:  Meeta Rowell is a 47 year old female with:    1.  Thyroid cancer:  She initially presented for the evaluation of thyroid nodule, first noted in 2013.  She was previously seen for this by Dr. Berry and recalls FNA biopsy which was \"benign\".  Then  she noted enlarged neck lymph nodes.  Repeat FNA biopsy of the 3.0 cm right thyroid nodule showed follicular lesion of undetermined significance. She underwent total thyroidectomy 12/11/2015 after Frozen section confirmed a follicular lesion and a small papillary carcinoma of the thyroid .  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 No abnormal activity to suggest  metastatic thyroid cancer.    Follow-up neck ultrasound 7/2016: No enlarged or abnormal-appearing lymph nodes are appreciated in the right or left neck.    TG ( suppressed ) : 1.3 ( 1/27/16)  TG ( stimulated ) : 2.7 ( 2/3/16)  TG ( TSH 1.28)  : < 0.10 (6/28/2016  TG  ( TSH 0.62) :  <0.10 (12/15/2016)  TG ( TSH = 0.23) : 0.1 (8/10/18)    - WBS:  8/13/2018 - FINDINGS: Physiologic activity noted. No abnormal activity to suggest metastatic thyroid cancer.    Postsurgical hypothyroidism:  She was started on Levothyroxine 150 mcg after surgery, dose " was increased to 175, then increased to currently dose 200 mcg x 6 days, 100 mcg x 1 day each week = average daily dose 185 mcg/day.  S/p menopause.   Taking generic levothyroxine  Wt stable.  Wt Readings from Last 2 Encounters:   08/22/18 89.6 kg (197 lb 9.6 oz)   02/15/18 89.4 kg (197 lb 3.2 oz)     Family history is positive for mother with hypothyroidism.  No family history of thyroid cancer.  No personal history of radiation exposure to the head or neck.  She has a history of Chron's disease and has been treated with Remicade for many years.  She is a nurse.        PMH/PSH:  Past Medical History:   Diagnosis Date     Cancer (H) 12/11/15    thyroid     Crohn's disease (H)      Depression      Endometriosis      GERD (gastroesophageal reflux disease)      Personal history of other genital system and obstetric disorders(V13.29)      Regional enteritis of small intestine (H) 2003    ileal disease     Thyroid disease     hypo     Past Surgical History:   Procedure Laterality Date     C NONSPECIFIC PROCEDURE  2000    s/p cholecystectomy     CHOLECYSTECTOMY       ENT SURGERY  12/2015    total thyroidectomy     THYROIDECTOMY N/A 12/11/2015    Procedure: THYROIDECTOMY;  Surgeon: Shari King MD;  Location: [facility] OR     Family Hx:  Family History   Problem Relation Age of Onset     Hypertension Mother      Cervical Cancer Mother      GASTROINTESTINAL DISEASE Mother      diverticulitis     Cardiovascular Mother      Hypothyroidism Mother      Coronary Artery Disease Mother      Hypertension Father      Diabetes Paternal Grandmother      Cerebrovascular Disease Paternal Grandmother      Thyroid disease: Yes, mother         DM2: No         Autoimmune: DM1, SLE, RA, Vitiligo:  No    Social Hx:  Social History     Social History     Marital status:      Spouse name: N/A     Number of children: 2     Years of education: N/A     Occupational History     nurse [employer]     cardiology nurse     Social  History Main Topics     Smoking status: Never Smoker     Smokeless tobacco: Never Used     Alcohol use 0.0 oz/week     0 Standard drinks or equivalent per week      Comment: once or twice a month     Drug use: No     Sexual activity: Yes     Partners: Male     Birth control/ protection: Surgical, Pill      Comment: Vasectomy     Other Topics Concern     Not on file     Social History Narrative    Older son, Atul,  , with developmental delay          MEDICATIONS:  has a current medication list which includes the following prescription(s): bupropion hcl er (xl), buspirone hcl, drospirenone-ethinyl estradiol, fluoxetine, infliximab, lansoprazole, levothyroxine, propranolol, and trazodone.    Review of Systems  10 point ROS neg other than the symptoms noted above in the HPI.    Physical Exam   VS: /70 (BP Location: Left arm, Patient Position: Chair, Cuff Size: Adult Large)  Pulse 64  Temp 98.2  F (36.8  C) (Oral)  Wt 89.6 kg (197 lb 9.6 oz)  Breastfeeding? No  BMI 37.34 kg/m2  GENERAL: NAD, well dressed, answering questions appropriately, appears stated age.  HEENT: no exophthalmos, no proptosis, no lig lag, no retraction, no scleral icterus  NECK:  Supply, thyroid bed palpably empty, no lymphadenopathy, well healed horizontal scar across the lower neck.  RESPIRATORY: Normal respiratory effort.  CARDIOVASCULAR: No peripheral edema.  PSYCH: Intact judgment and insight. A&OX3 with a cordial affect.    LABS:  TG:  TG ( suppressed ) : 1.3 ( 16)  TG ( stimulated ) : 2.7 ( 2/3/16)    2016:  TSH: 1.28  JUAN: <0.4  TG: <0.1    2016:  TSH: 0.62  JUAN: <0.4  TG: <0.1    8/10/2018:  TSH: 0.23  JUAN: < 0.4  T.1    TFTs:   !THYROID Latest Ref Rng & Units 2018 2017 6/15/2017   TSH 0.40 - 4.00 mU/L 0.23 (L) 0.02 (L) 0.27 (L)   T4 FREE 0.76 - 1.46 ng/dL 1.25 1.58 (H) 1.34     !THYROID Latest Ref Rng & Units 12/15/2016   TSH 0.40 - 4.00 mU/L 0.62   T4 FREE 0.76 - 1.46 ng/dL 1.32     FINAL  DIAGNOSIS:  A, B, and C.  Specimens: Right thyroid lobe, prelaryngeal tissue, left thyroid  lobe.  Procedure: Total thyroidectomy.  Received: Fresh.  Specimen Integrity: Divided (thyroidectomy performed as  lobectomy and completion thyroidectomy).  Tumor Focality: Four (4) foci of microcarcinomas .  Tumor Laterality: Right lobe (x2) and left lobe (x2).    Tumor Histologic Type (all tumors): Papillary microcarcinoma;  follicular variant, infiltrative.    Tumor Size:  Tumor #1 (right lobe): 0.8 cm.  Tumor #2 (right lobe): 0.6 cm.  Tumor #3 (left lobe): 0.2 cm.  Tumor #4 (left lobe): 0.25 cm.    Margins:  Tumor #1: Involved right anterolateral margin over an area of 0.4  cm.  Tumor #2: Tumor within 0.1 cm of the nearest (right anterolateral)  margin.  Tumor #3: Tumor within fraction of a mm of the left anterolateral  and medial margins.  Tumor #4: Tumor within fraction of a millimeter of the left  anterolateral margin.    Regional Lymph Nodes: No nodes submitted or found.    Pathologic Staging (pTNM): pT1a(m), pNX, pM not applicable.    Additional Pathologic Findings:  -Hyperplastic nodules (largest 2 cm wide; within right lobe).  -Single normal-appearing parathyroid gland identified within left  lobe, mid region.  -Prelaryngeal tissue (specimen C) comprises of benign fibrovascular  tissue without lymph nodes.  Prior biopsy site changes.      NM I-131 THYROID THERAPY  2/3/2016 9:38 AM     HISTORY: Thyroid cancer.     TECHNIQUE: Patient was given 100 mCi I-131orally and instructed in  home care.         IMPRESSION: Successful I-131 therapy administration.       NUCLEAR MEDICINE I-131 WHOLE BODY SCAN February 10, 2016 8:08 AM       HISTORY: Postprocedural hypothyroidism. Hypocalcemia. Malignant  neoplasm of thyroid gland,       COMPARISON: None.     TECHNIQUE: Patient was given I-131 orally prior to the scan followed  by whole body scan.       DOSE: Seven days previously the patient received 106.6 mCi  I-131.     FINDINGS: Physiologic activity noted. No abnormal activity to suggest  metastatic thyroid cancer.           IMPRESSION: No metastatic disease demonstrated.    Follow up Neck US 7/2016:  HEAD AND NECK SOFT TISSUE ULTRASOUND  7/18/2016  2:44 PM       HISTORY: Postprocedural hypothyroidism, Malignant neoplasm of thyroid  gland       FINDINGS: Anterior neck ultrasound shows no residual thyroid tissue.  Multiple bilateral normal-appearing lymph nodes are present. No  enlarged or abnormal-appearing lymph nodes are evident in the right or  left neck.                                                                       IMPRESSION: No enlarged or abnormal-appearing lymph nodes are  appreciated in the right or left neck.    NUCLEAR MEDICINE I-123 WHOLE BODY SCAN  August 16, 2018 12:06 PM      HISTORY: Postsurgical hypothyroidism. Thyroid cancer (H). Status post  total thyroidectomy.     COMPARISON: None.     TECHNIQUE: Patient was given I-123 orally followed by whole body scan.        DOSE: 2.2mci I123 in 8 capsules on 8/15/18 by [facility]     FINDINGS: Physiologic activity noted. No abnormal activity to suggest  metastatic thyroid cancer.          IMPRESSION: No metastatic disease demonstrated.    All pertinent notes, labs, and images personally reviewed by me.     A/P  Ms.Heather SERGE Rowell is a 47 year old here for the evaluation of:    1.  PTC:   FNA biopsy of the 3.0 cm right thyroid nodule showed follicular lesion of undetermined significance. She underwent total thyroidectomy 12/11/2015 after Frozen section confirmed a follicular lesion and a small papillary carcinoma of the thyroid   Final pathology report:  -Four foci of papillary carcinomas (Rx2, Lx2; largest size = 0.8cm on R)  -Margin grossly positive at tumor site extremely close to recurrent laryngeal nerve on R; other margins negative  -No nodes excised in specimen  -Therefore, path staging: pT1a, pNx, pMn/a   -Additional finding: intrathyroidal parathyroid  in mid left lobe   S/p I 131 ablation 2/3/16, received 106 mci I 131  Post therapy scan: Physiologic activity noted. No abnormal activity to suggest  metastatic thyroid cancer.  Follow up US 6/2016- no mets  -- TG appear stable  -- WBS 8/2018: No abnormal activity to suggest metastatic thyroid cancer.  Discussed precautions and protocol.  The patient indicates understanding of these issues and agrees with the plan.    #2. Postsurgical hypothyroidism:  Currently treated with Levothyroxine 200 mcg x 6 days, 100 mcg x 1 day = avg daily dose 185 mcg. Generic.   Clinically looks euthyroid. Goal TSH <1.0  Continue current dose of levothyroxine.    Follow-up:  6 months  Will verify with Dr. Donis to see when she would like to see her back in clinic for follow up.    Nancy Garcia NP  Endocrinology   [facility]  CC: Sultana Barton Sonja S                    Again, thank you for allowing me to participate in the care of your patient.        Sincerely,        SHANE Hill CNP

## 2018-08-22 NOTE — MR AVS SNAPSHOT
After Visit Summary   8/22/2018    Meeta Rowell    MRN: [id number]           Patient Information     Date Of Birth          1970        Visit Information        Provider Department      8/22/2018 2:30 PM Nancy Garcia APRN CNP [facility]        Today's Diagnoses     Thyroid cancer (H)        S/P total thyroidectomy           Follow-ups after your visit        Follow-up notes from your care team     Return in about 6 months (around 2/22/2019).      Who to contact     If you have questions or need follow up information about today's clinic visit or your schedule please contact [facility] directly at [phone].  Normal or non-critical lab and imaging results will be communicated to you by Dokogeohart, letter or phone within 4 business days after the clinic has received the results. If you do not hear from us within 7 days, please contact the clinic through Dokogeohart or phone. If you have a critical or abnormal lab result, we will notify you by phone as soon as possible.  Submit refill requests through [employer] or call your pharmacy and they will forward the refill request to us. Please allow 3 business days for your refill to be completed.          Additional Information About Your Visit        MyChart Information     [facility] gives you secure access to your electronic health record. If you see a primary care provider, you can also send messages to your care team and make appointments. If you have questions, please call your primary care clinic.  If you do not have a primary care provider, please call [phone] and they will assist you.        Care EveryWhere ID     This is your Care EveryWhere ID. This could be used by other organizations to access your [place] medical records  [id number]        Your Vitals Were     Pulse Temperature Breastfeeding? BMI (Body Mass Index)          64 98.2  F (36.8  C) (Oral) No 37.34 kg/m2         Blood Pressure from  Last 3 Encounters:   08/22/18 124/70   08/14/18 128/67   08/13/18 124/67    Weight from Last 3 Encounters:   08/22/18 89.6 kg (197 lb 9.6 oz)   02/15/18 89.4 kg (197 lb 3.2 oz)   12/12/17 88.3 kg (194 lb 11.2 oz)              Today, you had the following     No orders found for display         Where to get your medicines      [place] [facility] [address] [address]  [address] [address] [address]     Phone:  [phone]     levothyroxine 200 MCG tablet          Primary Care Provider Office Phone # Fax #    Jesenia Weinberg [phone] [address] [place] [address]        Equal Access to Services     [facility]: Hadurmila marina Sogiovanni, waaxda luqadaha, qaybta kaalmajo ann hu, christine sullivan . So [facility] [phone].    ATENCIÓN: Si habla español, tiene a domingo disposición servicios gratuitos de asistencia lingüística. Aniyah al [phone].    We comply with applicable federal civil rights laws and [place] laws. We do not discriminate on the basis of race, color, national origin, age, disability, sex, sexual orientation, or gender identity.            Thank you!     Thank you for choosing [facility]  for your care. Our goal is always to provide you with excellent care. Hearing back from our patients is one way we can continue to improve our services. Please take a few minutes to complete the written survey that you may receive in the mail after your visit with us. Thank you!             Your Updated Medication List - Protect others around you: Learn how to safely use, store and throw away your medicines at www.disposemymeds.org.          This list is accurate as of 8/22/18 11:59 PM.  Always use your most recent med list.                   Brand Name Dispense Instructions for use Diagnosis    BuPROPion HCl ER (XL) 450 MG Tb24     90 tablet    Take 450 mg  by mouth every morning Fill at Patient request. Can substitute one 150 mg and one 300 mg if needed.    Moderate recurrent major depression (H)       BusPIRone HCl 30 MG Tabs     180 tablet    Take 30 mg by mouth 2 times daily    Major depressive disorder, recurrent episode, moderate (H)       drospirenone-ethinyl estradiol 3-0.03 MG per tablet    KANA 28    112 tablet    Take 1 tablet by mouth daily Take active pills daily    Encounter for surveillance of contraceptive pills       FLUoxetine 40 MG capsule    PROzac    90 capsule    Take 1 capsule (40 mg) by mouth daily    Moderate recurrent major depression (H)       LANsoprazole 30 MG CR capsule    PREVACID     Take 30 mg by mouth daily        levothyroxine 200 MCG tablet    SYNTHROID/LEVOTHROID    90 tablet    Take 200 mcg X 6 days a week and 100 mcg/day X 1 day a week.    Thyroid cancer (H), S/P total thyroidectomy       propranolol 60 MG 24 hr capsule    INDERAL LA    90 capsule    TAKE ONE CAPSULE BY MOUTH EVERY DAY    Generalized anxiety disorder       REMICADE 100 MG injection   Generic drug:  inFLIXimab      Inject 3 mg/kg into the vein        traZODone 100 MG tablet    DESYREL    120 tablet    TAKE 1 TO 4 TABLETS BY MOUTH AT BEDTIME    Psychophysiological insomnia

## 2018-08-22 NOTE — PROGRESS NOTES
"Name: Meeta Rowell  F/u for postsurgical hypothyroidism and PTC (Last seen 2/15/2018 by Dr. Donis).  HPI:  Meeta Rowell is a 47 year old female with:    1.  Thyroid cancer:  She initially presented for the evaluation of thyroid nodule, first noted in 2013.  She was previously seen for this by Dr. Berry and recalls FNA biopsy which was \"benign\".  Then  she noted enlarged neck lymph nodes.  Repeat FNA biopsy of the 3.0 cm right thyroid nodule showed follicular lesion of undetermined significance. She underwent total thyroidectomy 12/11/2015 after Frozen section confirmed a follicular lesion and a small papillary carcinoma of the thyroid .  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 No abnormal activity to suggest  metastatic thyroid cancer.    Follow-up neck ultrasound 7/2016: No enlarged or abnormal-appearing lymph nodes are appreciated in the right or left neck.    TG ( suppressed ) : 1.3 ( 1/27/16)  TG ( stimulated ) : 2.7 ( 2/3/16)  TG ( TSH 1.28)  : < 0.10 (6/28/2016  TG  ( TSH 0.62) :  <0.10 (12/15/2016)  TG ( TSH = 0.23) : 0.1 (8/10/18)    - WBS:  8/13/2018 - FINDINGS: Physiologic activity noted. No abnormal activity to suggest metastatic thyroid cancer.    Postsurgical hypothyroidism:  She was started on Levothyroxine 150 mcg after surgery, dose was increased to 175, then increased to currently dose 200 mcg x 6 days, 100 mcg x 1 day each week = average daily dose 185 mcg/day.  S/p menopause.   Taking generic levothyroxine  Wt stable.  Wt Readings from Last 2 Encounters:   08/22/18 89.6 kg " (197 lb 9.6 oz)   02/15/18 89.4 kg (197 lb 3.2 oz)     Family history is positive for mother with hypothyroidism.  No family history of thyroid cancer.  No personal history of radiation exposure to the head or neck.  She has a history of Chron's disease and has been treated with Remicade for many years.  She is a nurse.        PMH/PSH:  Past Medical History:   Diagnosis Date     Cancer (H) 12/11/15    thyroid     Crohn's disease (H)      Depression      Endometriosis      GERD (gastroesophageal reflux disease)      Personal history of other genital system and obstetric disorders(V13.29)      Regional enteritis of small intestine (H) 2003    ileal disease     Thyroid disease     hypo     Past Surgical History:   Procedure Laterality Date     C NONSPECIFIC PROCEDURE  2000    s/p cholecystectomy     CHOLECYSTECTOMY       ENT SURGERY  12/2015    total thyroidectomy     THYROIDECTOMY N/A 12/11/2015    Procedure: THYROIDECTOMY;  Surgeon: Shari King MD;  Location: [facility] OR     Family Hx:  Family History   Problem Relation Age of Onset     Hypertension Mother      Cervical Cancer Mother      GASTROINTESTINAL DISEASE Mother      diverticulitis     Cardiovascular Mother      Hypothyroidism Mother      Coronary Artery Disease Mother      Hypertension Father      Diabetes Paternal Grandmother      Cerebrovascular Disease Paternal Grandmother      Thyroid disease: Yes, mother         DM2: No         Autoimmune: DM1, SLE, RA, Vitiligo:  No    Social Hx:  Social History     Social History     Marital status:      Spouse name: N/A     Number of children: 2     Years of education: N/A     Occupational History     nurse [employer]     cardiology nurse     Social History Main Topics     Smoking status: Never Smoker     Smokeless tobacco: Never Used     Alcohol use 0.0 oz/week     0 Standard drinks or equivalent per week      Comment: once or twice a month     Drug use: No     Sexual activity: Yes      Partners: Male     Birth control/ protection: Surgical, Pill      Comment: Vasectomy     Other Topics Concern     Not on file     Social History Narrative    Older son, Atul,  , with developmental delay          MEDICATIONS:  has a current medication list which includes the following prescription(s): bupropion hcl er (xl), buspirone hcl, drospirenone-ethinyl estradiol, fluoxetine, infliximab, lansoprazole, levothyroxine, propranolol, and trazodone.    Review of Systems  10 point ROS neg other than the symptoms noted above in the HPI.    Physical Exam   VS: /70 (BP Location: Left arm, Patient Position: Chair, Cuff Size: Adult Large)  Pulse 64  Temp 98.2  F (36.8  C) (Oral)  Wt 89.6 kg (197 lb 9.6 oz)  Breastfeeding? No  BMI 37.34 kg/m2  GENERAL: NAD, well dressed, answering questions appropriately, appears stated age.  HEENT: no exophthalmos, no proptosis, no lig lag, no retraction, no scleral icterus  NECK:  Supply, thyroid bed palpably empty, no lymphadenopathy, well healed horizontal scar across the lower neck.  RESPIRATORY: Normal respiratory effort.  CARDIOVASCULAR: No peripheral edema.  PSYCH: Intact judgment and insight. A&OX3 with a cordial affect.    LABS:  TG:  TG ( suppressed ) : 1.3 ( 16)  TG ( stimulated ) : 2.7 ( 2/3/16)    2016:  TSH: 1.28  JUAN: <0.4  TG: <0.1    2016:  TSH: 0.62  JUAN: <0.4  TG: <0.1    8/10/2018:  TSH: 0.23  JUAN: < 0.4  T.1    TFTs:   !THYROID Latest Ref Rng & Units 2018 2017 6/15/2017   TSH 0.40 - 4.00 mU/L 0.23 (L) 0.02 (L) 0.27 (L)   T4 FREE 0.76 - 1.46 ng/dL 1.25 1.58 (H) 1.34     !THYROID Latest Ref Rng & Units 12/15/2016   TSH 0.40 - 4.00 mU/L 0.62   T4 FREE 0.76 - 1.46 ng/dL 1.32     FINAL DIAGNOSIS:  A, B, and C.  Specimens: Right thyroid lobe, prelaryngeal tissue, left thyroid  lobe.  Procedure: Total thyroidectomy.  Received: Fresh.  Specimen Integrity: Divided (thyroidectomy performed as  lobectomy and completion  thyroidectomy).  Tumor Focality: Four (4) foci of microcarcinomas .  Tumor Laterality: Right lobe (x2) and left lobe (x2).    Tumor Histologic Type (all tumors): Papillary microcarcinoma;  follicular variant, infiltrative.    Tumor Size:  Tumor #1 (right lobe): 0.8 cm.  Tumor #2 (right lobe): 0.6 cm.  Tumor #3 (left lobe): 0.2 cm.  Tumor #4 (left lobe): 0.25 cm.    Margins:  Tumor #1: Involved right anterolateral margin over an area of 0.4  cm.  Tumor #2: Tumor within 0.1 cm of the nearest (right anterolateral)  margin.  Tumor #3: Tumor within fraction of a mm of the left anterolateral  and medial margins.  Tumor #4: Tumor within fraction of a millimeter of the left  anterolateral margin.    Regional Lymph Nodes: No nodes submitted or found.    Pathologic Staging (pTNM): pT1a(m), pNX, pM not applicable.    Additional Pathologic Findings:  -Hyperplastic nodules (largest 2 cm wide; within right lobe).  -Single normal-appearing parathyroid gland identified within left  lobe, mid region.  -Prelaryngeal tissue (specimen C) comprises of benign fibrovascular  tissue without lymph nodes.  Prior biopsy site changes.      NM I-131 THYROID THERAPY  2/3/2016 9:38 AM     HISTORY: Thyroid cancer.     TECHNIQUE: Patient was given 100 mCi I-131orally and instructed in  home care.         IMPRESSION: Successful I-131 therapy administration.       NUCLEAR MEDICINE I-131 WHOLE BODY SCAN February 10, 2016 8:08 AM       HISTORY: Postprocedural hypothyroidism. Hypocalcemia. Malignant  neoplasm of thyroid gland,       COMPARISON: None.     TECHNIQUE: Patient was given I-131 orally prior to the scan followed  by whole body scan.       DOSE: Seven days previously the patient received 106.6 mCi I-131.     FINDINGS: Physiologic activity noted. No abnormal activity to suggest  metastatic thyroid cancer.           IMPRESSION: No metastatic disease demonstrated.    Follow up Neck US 7/2016:  HEAD AND NECK SOFT TISSUE ULTRASOUND  7/18/2016  2:44  PM       HISTORY: Postprocedural hypothyroidism, Malignant neoplasm of thyroid  gland       FINDINGS: Anterior neck ultrasound shows no residual thyroid tissue.  Multiple bilateral normal-appearing lymph nodes are present. No  enlarged or abnormal-appearing lymph nodes are evident in the right or  left neck.                                                                       IMPRESSION: No enlarged or abnormal-appearing lymph nodes are  appreciated in the right or left neck.    NUCLEAR MEDICINE I-123 WHOLE BODY SCAN  August 16, 2018 12:06 PM      HISTORY: Postsurgical hypothyroidism. Thyroid cancer (H). Status post  total thyroidectomy.     COMPARISON: None.     TECHNIQUE: Patient was given I-123 orally followed by whole body scan.        DOSE: 2.2mci I123 in 8 capsules on 8/15/18 by LB     FINDINGS: Physiologic activity noted. No abnormal activity to suggest  metastatic thyroid cancer.          IMPRESSION: No metastatic disease demonstrated.    All pertinent notes, labs, and images personally reviewed by me.     A/P  Ms.Heather SERGE Rowell is a 47 year old here for the evaluation of:    1.  PTC:   FNA biopsy of the 3.0 cm right thyroid nodule showed follicular lesion of undetermined significance. 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 No abnormal activity to suggest  metastatic thyroid cancer.  Follow up US 6/2016- no mets  -- TG appear stable  -- WBS 8/2018: No  abnormal activity to suggest metastatic thyroid cancer.  Discussed precautions and protocol.  The patient indicates understanding of these issues and agrees with the plan.    #2. Postsurgical hypothyroidism:  Currently treated with Levothyroxine 200 mcg x 6 days, 100 mcg x 1 day = avg daily dose 185 mcg. Generic.   Clinically looks euthyroid. Goal TSH <1.0  Continue current dose of levothyroxine.    Follow-up:  6 months  Will verify with Dr. Donis to see when she would like to see her back in clinic for follow up.    Nancy Garcia NP  Endocrinology   [facility]  CC: Sultana Barton Sonja S

## 2018-08-23 ASSESSMENT — ANXIETY QUESTIONNAIRES
3. WORRYING TOO MUCH ABOUT DIFFERENT THINGS: NOT AT ALL
1. FEELING NERVOUS, ANXIOUS, OR ON EDGE: NOT AT ALL
IF YOU CHECKED OFF ANY PROBLEMS ON THIS QUESTIONNAIRE, HOW DIFFICULT HAVE THESE PROBLEMS MADE IT FOR YOU TO DO YOUR WORK, TAKE CARE OF THINGS AT HOME, OR GET ALONG WITH OTHER PEOPLE: SOMEWHAT DIFFICULT
7. FEELING AFRAID AS IF SOMETHING AWFUL MIGHT HAPPEN: NOT AT ALL
5. BEING SO RESTLESS THAT IT IS HARD TO SIT STILL: NOT AT ALL
7. FEELING AFRAID AS IF SOMETHING AWFUL MIGHT HAPPEN: NOT AT ALL
GAD7 TOTAL SCORE: 3
6. BECOMING EASILY ANNOYED OR IRRITABLE: SEVERAL DAYS
6. BECOMING EASILY ANNOYED OR IRRITABLE: SEVERAL DAYS
3. WORRYING TOO MUCH ABOUT DIFFERENT THINGS: NOT AT ALL
GAD7 TOTAL SCORE: 3
2. NOT BEING ABLE TO STOP OR CONTROL WORRYING: NOT AT ALL
5. BEING SO RESTLESS THAT IT IS HARD TO SIT STILL: NOT AT ALL
2. NOT BEING ABLE TO STOP OR CONTROL WORRYING: NOT AT ALL
IF YOU CHECKED OFF ANY PROBLEMS ON THIS QUESTIONNAIRE, HOW DIFFICULT HAVE THESE PROBLEMS MADE IT FOR YOU TO DO YOUR WORK, TAKE CARE OF THINGS AT HOME, OR GET ALONG WITH OTHER PEOPLE: SOMEWHAT DIFFICULT
1. FEELING NERVOUS, ANXIOUS, OR ON EDGE: NOT AT ALL

## 2018-08-23 ASSESSMENT — PATIENT HEALTH QUESTIONNAIRE - PHQ9
5. POOR APPETITE OR OVEREATING: MORE THAN HALF THE DAYS
5. POOR APPETITE OR OVEREATING: MORE THAN HALF THE DAYS

## 2018-08-24 ASSESSMENT — PATIENT HEALTH QUESTIONNAIRE - PHQ9: SUM OF ALL RESPONSES TO PHQ QUESTIONS 1-9: 9

## 2018-08-24 ASSESSMENT — ANXIETY QUESTIONNAIRES: GAD7 TOTAL SCORE: 3

## 2018-08-30 DIAGNOSIS — F51.04 PSYCHOPHYSIOLOGICAL INSOMNIA: ICD-10-CM

## 2018-08-30 RX ORDER — TRAZODONE HYDROCHLORIDE 100 MG/1
TABLET ORAL
Qty: 120 TABLET | Refills: 2 | Status: CANCELLED | OUTPATIENT
Start: 2018-08-30

## 2018-08-30 NOTE — TELEPHONE ENCOUNTER
"Requested Prescriptions   Pending Prescriptions Disp Refills     traZODone (DESYREL) 100 MG tablet    Last Written Prescription Date:  5/1/2018  Last Fill Quantity: 120,  # refills: 2   Last office visit: 12/12/2017 with prescribing provider:  Jesenia Weinberg     Future Office Visit:     120 tablet 2    Serotonin Modulators Passed    8/30/2018  8:24 AM       Passed - Recent (12 mo) or future (30 days) visit within the authorizing provider's specialty    Patient had office visit in the last 12 months or has a visit in the next 30 days with authorizing provider or within the authorizing provider's specialty.  See \"Patient Info\" tab in inbasket, or \"Choose Columns\" in Meds & Orders section of the refill encounter.           Passed - Patient is age 18 or older       Passed - No active pregnancy on record       Passed - No positive pregnancy test in past 12 months          "

## 2018-08-31 ENCOUNTER — MYC REFILL (OUTPATIENT)
Dept: PEDIATRICS | Facility: CLINIC | Age: 48
End: 2018-08-31

## 2018-08-31 DIAGNOSIS — F51.04 PSYCHOPHYSIOLOGICAL INSOMNIA: ICD-10-CM

## 2018-08-31 RX ORDER — TRAZODONE HYDROCHLORIDE 100 MG/1
100-400 TABLET ORAL AT BEDTIME
Qty: 360 TABLET | Refills: 0 | Status: SHIPPED | OUTPATIENT
Start: 2018-08-31 | End: 2018-12-05

## 2018-08-31 NOTE — TELEPHONE ENCOUNTER
Message from [employer]:  Original authorizing provider: MD Meeta Rodgers would like a refill of the following medications:  traZODone (DESYREL) 100 MG tablet [Jesenia Weinberg MD]    Preferred pharmacy: [facility] [id number] [facility]    Comment:  Is there any chance this could be renewed today? I didn't realize I do not have any refills left. Thanks. Meeta Rowell

## 2018-09-14 ENCOUNTER — MEDICAL CORRESPONDENCE (OUTPATIENT)
Dept: HEALTH INFORMATION MANAGEMENT | Facility: CLINIC | Age: 48
End: 2018-09-14

## 2018-11-06 ENCOUNTER — TRANSFERRED RECORDS (OUTPATIENT)
Dept: HEALTH INFORMATION MANAGEMENT | Facility: CLINIC | Age: 48
End: 2018-11-06

## 2018-12-19 ENCOUNTER — MYC REFILL (OUTPATIENT)
Dept: PEDIATRICS | Facility: CLINIC | Age: 48
End: 2018-12-19

## 2018-12-19 DIAGNOSIS — F33.1 MAJOR DEPRESSIVE DISORDER, RECURRENT EPISODE, MODERATE (H): ICD-10-CM

## 2018-12-20 RX ORDER — BUSPIRONE HYDROCHLORIDE 30 MG/1
30 TABLET ORAL 2 TIMES DAILY
Qty: 180 TABLET | Refills: 3 | OUTPATIENT
Start: 2018-12-20

## 2018-12-20 NOTE — TELEPHONE ENCOUNTER
Duplicate request.  Filled yesterday with requesting pharmacy  Meeta ZARAGOZA RN - Triage  [facility]

## 2019-01-15 ENCOUNTER — OFFICE VISIT (OUTPATIENT)
Dept: PEDIATRICS | Facility: CLINIC | Age: 49
End: 2019-01-15
Payer: COMMERCIAL

## 2019-01-15 VITALS
BODY MASS INDEX: 38.14 KG/M2 | HEART RATE: 68 BPM | OXYGEN SATURATION: 98 % | SYSTOLIC BLOOD PRESSURE: 114 MMHG | TEMPERATURE: 98.8 F | DIASTOLIC BLOOD PRESSURE: 76 MMHG | WEIGHT: 202 LBS | HEIGHT: 61 IN

## 2019-01-15 DIAGNOSIS — E89.0 S/P TOTAL THYROIDECTOMY: ICD-10-CM

## 2019-01-15 DIAGNOSIS — E89.0 POSTSURGICAL HYPOTHYROIDISM: ICD-10-CM

## 2019-01-15 DIAGNOSIS — D84.9 IMMUNOSUPPRESSED STATUS (H): ICD-10-CM

## 2019-01-15 DIAGNOSIS — C73 THYROID CANCER (H): ICD-10-CM

## 2019-01-15 DIAGNOSIS — R73.01 IMPAIRED FASTING GLUCOSE: ICD-10-CM

## 2019-01-15 DIAGNOSIS — Z30.41 ENCOUNTER FOR SURVEILLANCE OF CONTRACEPTIVE PILLS: ICD-10-CM

## 2019-01-15 DIAGNOSIS — K50.00 CROHN'S DISEASE OF SMALL INTESTINE WITHOUT COMPLICATION (H): ICD-10-CM

## 2019-01-15 DIAGNOSIS — Z00.00 ROUTINE GENERAL MEDICAL EXAMINATION AT A HEALTH CARE FACILITY: Primary | ICD-10-CM

## 2019-01-15 DIAGNOSIS — K21.9 GASTROESOPHAGEAL REFLUX DISEASE WITHOUT ESOPHAGITIS: ICD-10-CM

## 2019-01-15 DIAGNOSIS — E66.01 MORBID OBESITY (H): ICD-10-CM

## 2019-01-15 DIAGNOSIS — D09.9 PAPILLARY CARCINOMA IN SITU: ICD-10-CM

## 2019-01-15 DIAGNOSIS — F33.1 MODERATE RECURRENT MAJOR DEPRESSION (H): ICD-10-CM

## 2019-01-15 DIAGNOSIS — F51.04 PSYCHOPHYSIOLOGICAL INSOMNIA: ICD-10-CM

## 2019-01-15 LAB — HBA1C MFR BLD: 5.7 % (ref 0–5.6)

## 2019-01-15 PROCEDURE — 84439 ASSAY OF FREE THYROXINE: CPT | Performed by: INTERNAL MEDICINE

## 2019-01-15 PROCEDURE — 36415 COLL VENOUS BLD VENIPUNCTURE: CPT | Performed by: INTERNAL MEDICINE

## 2019-01-15 PROCEDURE — 83036 HEMOGLOBIN GLYCOSYLATED A1C: CPT | Performed by: INTERNAL MEDICINE

## 2019-01-15 PROCEDURE — 84443 ASSAY THYROID STIM HORMONE: CPT | Performed by: INTERNAL MEDICINE

## 2019-01-15 PROCEDURE — 99213 OFFICE O/P EST LOW 20 MIN: CPT | Mod: 25 | Performed by: INTERNAL MEDICINE

## 2019-01-15 PROCEDURE — 99396 PREV VISIT EST AGE 40-64: CPT | Performed by: INTERNAL MEDICINE

## 2019-01-15 RX ORDER — BUSPIRONE HYDROCHLORIDE 30 MG/1
30 TABLET ORAL 2 TIMES DAILY
Qty: 180 TABLET | Refills: 3 | Status: SHIPPED | OUTPATIENT
Start: 2019-01-15 | End: 2020-01-16

## 2019-01-15 RX ORDER — BUPROPION HYDROCHLORIDE 450 MG/1
450 TABLET, FILM COATED, EXTENDED RELEASE ORAL EVERY MORNING
Qty: 90 TABLET | Refills: 3 | Status: SHIPPED | OUTPATIENT
Start: 2019-01-15 | End: 2020-01-16

## 2019-01-15 RX ORDER — MECOBALAMIN 5000 MCG
15 TABLET,DISINTEGRATING ORAL DAILY
Qty: 90 CAPSULE | Refills: 3 | Status: SHIPPED | OUTPATIENT
Start: 2019-01-15 | End: 2020-01-16

## 2019-01-15 RX ORDER — DROSPIRENONE AND ETHINYL ESTRADIOL 0.03MG-3MG
1 KIT ORAL DAILY
Qty: 112 TABLET | Refills: 4 | Status: SHIPPED | OUTPATIENT
Start: 2019-01-15 | End: 2020-01-21

## 2019-01-15 RX ORDER — TRAZODONE HYDROCHLORIDE 100 MG/1
100-400 TABLET ORAL AT BEDTIME
Qty: 360 TABLET | Refills: 3 | Status: SHIPPED | OUTPATIENT
Start: 2019-01-15 | End: 2020-03-11

## 2019-01-15 ASSESSMENT — ENCOUNTER SYMPTOMS
FEVER: 0
CONSTIPATION: 0
COUGH: 0
CHILLS: 0
NERVOUS/ANXIOUS: 1
ABDOMINAL PAIN: 0
DIARRHEA: 0
EYE PAIN: 0
HEMATURIA: 0
DIZZINESS: 0
HEMATOCHEZIA: 0

## 2019-01-15 ASSESSMENT — PATIENT HEALTH QUESTIONNAIRE - PHQ9
10. IF YOU CHECKED OFF ANY PROBLEMS, HOW DIFFICULT HAVE THESE PROBLEMS MADE IT FOR YOU TO DO YOUR WORK, TAKE CARE OF THINGS AT HOME, OR GET ALONG WITH OTHER PEOPLE: SOMEWHAT DIFFICULT
SUM OF ALL RESPONSES TO PHQ QUESTIONS 1-9: 14
SUM OF ALL RESPONSES TO PHQ QUESTIONS 1-9: 14

## 2019-01-15 ASSESSMENT — MIFFLIN-ST. JEOR: SCORE: 1478.65

## 2019-01-15 NOTE — PATIENT INSTRUCTIONS
Preventive Health Recommendations  Female Ages 40 to 49    Yearly exam:     See your health care provider every year in order to  1. Review health changes.   2. Discuss preventive care.    3. Review your medicines if your doctor prescribed any.      If you get Pap tests with HPV test, you only need to test every 5 years, unless you have an abnormal result. You are due in 2 yrs    Have a mammogram this fall - you can schedule on mychart or I can .      Have a colonoscopy (test for colon cancer) with [facility]      Have a cholesterol test every 3-5 years.       Have a diabetes test (fasting glucose) yearly    Shots: Get a flu shot each year. Get a tetanus shot every 10 years - you are due in 2027.     Nutrition:     Eat at least 5 servings of fruits and vegetables each day.    Eat whole-grain bread, whole-wheat pasta and brown rice instead of white grains and rice.    Get adequate Calcium and Vitamin D.      Lifestyle    Exercise at least 150 minutes a week (an average of 30 minutes a day, 5 days a week). This will help you control your weight and prevent disease.    Limit alcohol to one drink per day.    No smoking.     Wear sunscreen to prevent skin cancer.    See your dentist every six months for an exam and cleaning.    Light exposure in winter time can be helpful, starting in November through March.  The goal is a 10,000 lux full-spectrum light.  Start at 5 mins and work up to 30 minutes a morning.  Www.lighttherapyproducts.com.     Increase the prozac to 60mg daily and let me know if you are not better in 2-3 months or sooner if you are worse or side effect.     Try cutting back on the trazodone to see if that helps with the daytime sleepiness.    Schedule follow-up with endocrine for next month

## 2019-01-15 NOTE — PROGRESS NOTES
SUBJECTIVE:   CC: Meeta Rowell is an 48 year old woman who presents for preventive health visit.     Physical   Annual:     Getting at least 3 servings of Calcium per day:  NO    Bi-annual eye exam:  Yes    Dental care twice a year:  NO    Sleep apnea or symptoms of sleep apnea:  Daytime drowsiness    Diet:  Regular (no restrictions)    Frequency of exercise:  None    Taking medications regularly:  Yes    Medication side effects:  Not applicable    Additional concerns today:  No (refills , mental health )    PHQ-2 Total Score: 3  Saw [facility] today - labs done in November.  Albumin was low but otherwise normal.  No foamy urine.    Started WW a week ago.    Depression:  Today's PHQ-2 Score:   PHQ-2 ( 1999 [employer]) 1/15/2019   Q1: Little interest or pleasure in doing things 2   Q2: Feeling down, depressed or hopeless 1   PHQ-2 Score 3   Q1: Little interest or pleasure in doing things More than half the days   Q2: Feeling down, depressed or hopeless Several days   PHQ-2 Score 3     PHQ-9 SCORE 8/23/2018 8/23/2018 1/15/2019   PHQ-9 Total Score - - -   PHQ-9 Total Score MyChart - - 14 (Moderate depression)   PHQ-9 Total Score 9 9 14      concerns about medications, feels like has more days that doesn't want to do anything.  More emotional and gets angry more easily.    Abuse: Current or Past(Physical, Sexual or Emotional)- No  Do you feel safe in your environment? Yes    Social History     Tobacco Use     Smoking status: Never Smoker     Smokeless tobacco: Never Used   Substance Use Topics     Alcohol use: Yes     Alcohol/week: 0.0 oz     Comment: once or twice a month     Alcohol Use 1/15/2019   If you drink alcohol do you typically have greater than 3 drinks per day OR greater than 7 drinks per week? No       Reviewed orders with patient.  Reviewed health maintenance and updated orders accordingly - Yes  Labs reviewed in EPIC    Mammogram Screening: Patient under age 50, mutual decision reflected in health  "maintenance.      Pertinent mammograms are reviewed under the imaging tab.  History of abnormal Pap smear: NO - age 30-65 PAP every 5 years with negative HPV co-testing recommended  PAP / HPV Latest Ref Rng & Units 12/8/2015   PAP - NIL   HPV 16 DNA NEG Negative   HPV 18 DNA NEG Negative   OTHER HR HPV NEG Negative     Reviewed and updated as needed this visit by clinical staff  Tobacco  Allergies  Meds  Problems  Med Hx  Surg Hx  Fam Hx  Soc Hx          Reviewed and updated as needed this visit by Provider  Tobacco  Allergies  Meds  Problems  Med Hx  Surg Hx  Fam Hx            Review of Systems   Constitutional: Negative for chills and fever.   HENT: Positive for congestion. Negative for ear pain.    Eyes: Negative for pain.   Respiratory: Negative for cough.    Cardiovascular: Negative for chest pain.   Gastrointestinal: Negative for abdominal pain, constipation, diarrhea and hematochezia.   Genitourinary: Negative for hematuria.   Neurological: Negative for dizziness.   Psychiatric/Behavioral: The patient is nervous/anxious.         OBJECTIVE:   /76 (BP Location: Right arm, Patient Position: Right side, Cuff Size: Adult Large)   Pulse 68   Temp 98.8  F (37.1  C) (Tympanic)   Ht 1.549 m (5' 1\")   Wt 91.6 kg (202 lb)   SpO2 98%   BMI 38.17 kg/m    Physical Exam  GENERAL: alert, no distress and over weight  EYES: Eyes grossly normal to inspection, PERRL and conjunctivae and sclerae normal  HENT: ear canals and TM's normal, nose and mouth without ulcers or lesions  NECK: no adenopathy, no asymmetry, masses, or scars and thyroid normal to palpation  RESP: lungs clear to auscultation - no rales, rhonchi or wheezes  BREAST: normal without masses, tenderness or nipple discharge and no palpable axillary masses or adenopathy  CV: regular rate and rhythm, normal S1 S2, no S3 or S4, no murmur, click or rub, no peripheral edema and peripheral pulses strong  ABDOMEN: soft, nontender, no " hepatosplenomegaly, no masses and bowel sounds normal  MS: no gross musculoskeletal defects noted, no edema  SKIN: no suspicious lesions or rashes  NEURO: Normal strength and tone, mentation intact and speech normal  PSYCH: mentation appears normal, affect flat        ASSESSMENT/PLAN:   1. Routine general medical examination at a health care facility  Routine health education discussed: calcium, diet, exercise, weight, safety.     2. Morbid obesity (H)  Starting WW    3. Moderate recurrent major depression (H)  Uncontrolled, bump up fluoxetine per orders, discussed stress and coping.  Follow-up in 2-3 months, sooner if worsening  - BuPROPion HCl ER, XL, 450 MG TB24; Take 450 mg by mouth every morning Fill at Patient request. Can substitute one 150 mg and one 300 mg if needed.  Dispense: 90 tablet; Refill: 3  - busPIRone HCl (BUSPAR) 30 MG tablet; Take 1 tablet (30 mg) by mouth 2 times daily  Dispense: 180 tablet; Refill: 3  - FLUoxetine (PROZAC) 20 MG capsule; Take 3 capsules (60 mg) by mouth daily  Dispense: 270 capsule; Refill: 0    4. Immunosuppressed status (H)  Continue medications per GI.  Discussed infection avoidance    5. Encounter for surveillance of contraceptive pills  refilled  - drospirenone-ethinyl estradiol (KANA 28) 3-0.03 MG tablet; Take 1 tablet by mouth daily Take active pills daily  Dispense: 112 tablet; Refill: 4    6. Psychophysiological insomnia  Refilled but recommended trial of cutting back to help with daytime alertness, also trial morning full spectrum light  - traZODone (DESYREL) 100 MG tablet; Take 1-4 tablets (100-400 mg) by mouth At Bedtime  Dispense: 360 tablet; Refill: 3    7. Postsurgical hypothyroidism  recheck  - TSH  - T4 free    8. Thyroid cancer (H)  Recheck labs and follow-up with endo  - TSH  - T4 free    9. S/P total thyroidectomy    - TSH  - T4 free    10. Crohn's disease of small intestine without complication (H)  Follow-up with GI, continue medications     11.  "Impaired fasting glucose  Discussed glucose elevation.  Discuss this is a pre-diabetic condition.  Recommended eating healthily, exercising and maintaining a healthy weight to prevent the development of diabetes.  Recommended blood sugar checks at least yearly to monitor this.  Recommended dietary consultation which the patient declined.   - HEMOGLOBIN A1C    12. Papillary carcinoma in situ  Follow-up with endo    13. Gastroesophageal reflux disease without esophagitis  Refill   - LANsoprazole (PREVACID) 15 MG DR capsule; Take 1 capsule (15 mg) by mouth daily  Dispense: 90 capsule; Refill: 3    COUNSELING:  Reviewed preventive health counseling, as reflected in patient instructions    BP Readings from Last 1 Encounters:   01/15/19 114/76     Estimated body mass index is 38.17 kg/m  as calculated from the following:    Height as of this encounter: 1.549 m (5' 1\").    Weight as of this encounter: 91.6 kg (202 lb).      Weight management plan: Discussed healthy diet and exercise guidelines     reports that  has never smoked. she has never used smokeless tobacco.      Counseling Resources:  ATP IV Guidelines  Pooled Cohorts Equation Calculator  Breast Cancer Risk Calculator  FRAX Risk Assessment  ICSI Preventive Guidelines  Dietary Guidelines for Americans, 2010  USDA's MyPlate  ASA Prophylaxis  Lung CA Screening    Jesenia Weinberg MD  [facility]  "

## 2019-01-16 ENCOUNTER — TELEPHONE (OUTPATIENT)
Dept: ENDOCRINOLOGY | Facility: CLINIC | Age: 49
End: 2019-01-16

## 2019-01-16 LAB
T4 FREE SERPL-MCNC: 1.57 NG/DL (ref 0.76–1.46)
TSH SERPL DL<=0.005 MIU/L-ACNC: 0.17 MU/L (ref 0.4–4)

## 2019-01-16 ASSESSMENT — PATIENT HEALTH QUESTIONNAIRE - PHQ9: SUM OF ALL RESPONSES TO PHQ QUESTIONS 1-9: 14

## 2019-01-17 NOTE — TELEPHONE ENCOUNTER
ENDO THYROID LABS-[facility] Latest Ref Rng & Units 1/15/2019 8/8/2018   TSH 0.40 - 4.00 mU/L 0.17 (L) 0.23 (L)   T4 FREE 0.76 - 1.46 ng/dL 1.57 (H) 1.25     History of thyroid cancer  Last clinic visit with me February 2018  Saw Nancy Garcia in August 2018  Given history of thyroid cancer TSH is in acceptable range  Please asked patient to schedule follow-up in next 1 month so that we can discuss if any concerning symptoms and consider dose adjustment if indicated  Continue current dose for now if patient is not having any major symptoms.  Symptoms to watch for will be palpitations, tremors, unintentional weight loss, diarrhea, menstrual irregularities.    Please call patient with above information.    Alice Donis MD  Endocrinology   [facility]  January 16, 2019      
I left a message for the patient to return our call.  Shweta Lozada CMA    Message sent via [employer].  Shweta Lozada CMA      
I left a message for the patient to return our call.  Shweta Lozada, CMA      
Last read by Meeta Rowell at 9:02 AM on 1/17/2019.  appt on 04/04.  Shweta Lozada CMA      
Not applicable

## 2019-03-20 ENCOUNTER — MYC MEDICAL ADVICE (OUTPATIENT)
Dept: PEDIATRICS | Facility: CLINIC | Age: 49
End: 2019-03-20

## 2019-03-28 ENCOUNTER — MYC MEDICAL ADVICE (OUTPATIENT)
Dept: PEDIATRICS | Facility: CLINIC | Age: 49
End: 2019-03-28

## 2019-03-29 ENCOUNTER — E-VISIT (OUTPATIENT)
Dept: PEDIATRICS | Facility: CLINIC | Age: 49
End: 2019-03-29
Payer: COMMERCIAL

## 2019-03-29 DIAGNOSIS — F41.9 ANXIETY: ICD-10-CM

## 2019-03-29 DIAGNOSIS — F40.243 FEAR OF FLYING: Primary | ICD-10-CM

## 2019-03-29 PROCEDURE — 99444 ZZC PHYSICIAN ONLINE EVALUATION & MANAGEMENT SERVICE: CPT | Performed by: INTERNAL MEDICINE

## 2019-03-29 RX ORDER — ALPRAZOLAM 0.25 MG
.25-.5 TABLET ORAL 2 TIMES DAILY PRN
Qty: 8 TABLET | Refills: 0 | Status: SHIPPED | OUTPATIENT
Start: 2019-03-29 | End: 2019-09-25

## 2019-03-29 ASSESSMENT — ANXIETY QUESTIONNAIRES
2. NOT BEING ABLE TO STOP OR CONTROL WORRYING: SEVERAL DAYS
5. BEING SO RESTLESS THAT IT IS HARD TO SIT STILL: NOT AT ALL
GAD7 TOTAL SCORE: 4
6. BECOMING EASILY ANNOYED OR IRRITABLE: SEVERAL DAYS
4. TROUBLE RELAXING: NOT AT ALL
3. WORRYING TOO MUCH ABOUT DIFFERENT THINGS: SEVERAL DAYS
7. FEELING AFRAID AS IF SOMETHING AWFUL MIGHT HAPPEN: NOT AT ALL
GAD7 TOTAL SCORE: 4
7. FEELING AFRAID AS IF SOMETHING AWFUL MIGHT HAPPEN: NOT AT ALL
1. FEELING NERVOUS, ANXIOUS, OR ON EDGE: SEVERAL DAYS

## 2019-03-29 NOTE — PATIENT INSTRUCTIONS
Patient Education     Patient Education    Alprazolam Oral disintegrating tablet    Alprazolam Oral solution    Alprazolam Oral tablet    Alprazolam Oral tablet, extended-release  Alprazolam Oral tablet  What is this medicine?  ALPRAZOLAM (al PRAY owen patten) is a benzodiazepine. It is used to treat anxiety and panic attacks.  This medicine may be used for other purposes; ask your health care provider or pharmacist if you have questions.  What should I tell my health care provider before I take this medicine?  They need to know if you have any of these conditions:    an alcohol or drug abuse problem    bipolar disorder, depression, psychosis or other mental health conditions    glaucoma    kidney or liver disease    lung or breathing disease    myasthenia gravis    Parkinson's disease    porphyria    seizures or a history of seizures    suicidal thoughts    an unusual or allergic reaction to alprazolam, other benzodiazepines, foods, dyes, or preservatives    pregnant or trying to get pregnant    breast-feeding  How should I use this medicine?  Take this medicine by mouth with a glass of water. Follow the directions on the prescription label. Take your medicine at regular intervals. Do not take it more often than directed. If you have been taking this medicine regularly for some time, do not suddenly stop taking it. You must gradually reduce the dose or you may get severe side effects. Ask your doctor or health care professional for advice. Even after you stop taking this medicine it can still affect your body for several days.  Talk to your pediatrician regarding the use of this medicine in children. Special care may be needed.  Overdosage: If you think you have taken too much of this medicine contact a poison control center or emergency room at once.  NOTE: This medicine is only for you. Do not share this medicine with others.  What if I miss a dose?  If you miss a dose, take it as soon as you can. If it is almost  time for your next dose, take only that dose. Do not take double or extra doses.  What may interact with this medicine?  Do not take this medicine with any of the following medications:    certain medicines for HIV infection or AIDS    ketoconazole    itraconazole  This medicine may also interact with the following medications:    birth control pills    certain macrolide antibiotics like clarithromycin, erythromycin, troleandomycin    cimetidine    cyclosporine    ergotamine    grapefruit juice    herbal or dietary supplements like kava kava, melatonin, dehydroepiandrosterone, DHEA, Pryor's Wort or valerian    imatinib, STI-571    isoniazid    levodopa    medicines for depression, anxiety, or psychotic disturbances    prescription pain medicines    rifampin, rifapentine, or rifabutin    some medicines for blood pressure or heart problems    some medicines for seizures like carbamazepine, oxcarbazepine, phenobarbital, phenytoin, primidone  This list may not describe all possible interactions. Give your health care provider a list of all the medicines, herbs, non-prescription drugs, or dietary supplements you use. Also tell them if you smoke, drink alcohol, or use illegal drugs. Some items may interact with your medicine.  What should I watch for while using this medicine?  Visit your doctor or health care professional for regular checks on your progress. Your body can become dependent on this medicine. Ask your doctor or health care professional if you still need to take it.  You may get drowsy or dizzy. Do not drive, use machinery, or do anything that needs mental alertness until you know how this medicine affects you. To reduce the risk of dizzy and fainting spells, do not stand or sit up quickly, especially if you are an older patient. Alcohol may increase dizziness and drowsiness. Avoid alcoholic drinks.  Do not treat yourself for coughs, colds or allergies without asking your doctor or health care  professional for advice. Some ingredients can increase possible side effects.  What side effects may I notice from receiving this medicine?  Side effects that you should report to your doctor or health care professional as soon as possible:    allergic reactions like skin rash, itching or hives, swelling of the face, lips, or tongue    confusion, forgetfulness    depression    difficulty sleeping    difficulty speaking    feeling faint or lightheaded, falls    mood changes, excitability or aggressive behavior    muscle cramps    trouble passing urine or change in the amount of urine    unusually weak or tired  Side effects that usually do not require medical attention (report to your doctor or health care professional if they continue or are bothersome):    change in sex drive or performance    changes in appetite  This list may not describe all possible side effects. Call your doctor for medical advice about side effects. You may report side effects to FDA at 6-165-FDA-0369.  Where should I keep my medicine?  Keep out of the reach of children. This medicine can be abused. Keep your medicine in a safe place to protect it from theft. Do not share this medicine with anyone. Selling or giving away this medicine is dangerous and against the law.  Store at room temperature between 20 and 25 degrees C (68 and 77 degrees F). This medicine may cause accidental overdose and death if taken by other adults, children, or pets. Mix any unused medicine with a substance like cat litter or coffee grounds. Then throw the medicine away in a sealed container like a sealed bag or a coffee can with a lid. Do not use the medicine after the expiration date.  NOTE: This sheet is a summary. It may not cover all possible information. If you have questions about this medicine, talk to your doctor, pharmacist, or health care provider.  NOTE:This sheet is a summary. It may not cover all possible information. If you have questions about this  medicine, talk to your doctor, pharmacist, or health care provider. Copyright  2016 Gold Standard

## 2019-03-30 ASSESSMENT — ANXIETY QUESTIONNAIRES: GAD7 TOTAL SCORE: 4

## 2019-04-04 ENCOUNTER — OFFICE VISIT (OUTPATIENT)
Dept: ENDOCRINOLOGY | Facility: CLINIC | Age: 49
End: 2019-04-04
Payer: COMMERCIAL

## 2019-04-04 VITALS
WEIGHT: 187.7 LBS | HEIGHT: 61 IN | BODY MASS INDEX: 35.44 KG/M2 | OXYGEN SATURATION: 96 % | TEMPERATURE: 98.5 F | HEART RATE: 81 BPM | DIASTOLIC BLOOD PRESSURE: 74 MMHG | SYSTOLIC BLOOD PRESSURE: 116 MMHG

## 2019-04-04 DIAGNOSIS — C73 THYROID CANCER (H): Primary | ICD-10-CM

## 2019-04-04 DIAGNOSIS — E89.0 S/P TOTAL THYROIDECTOMY: ICD-10-CM

## 2019-04-04 PROCEDURE — 36415 COLL VENOUS BLD VENIPUNCTURE: CPT | Performed by: INTERNAL MEDICINE

## 2019-04-04 PROCEDURE — 84439 ASSAY OF FREE THYROXINE: CPT | Performed by: INTERNAL MEDICINE

## 2019-04-04 PROCEDURE — 99214 OFFICE O/P EST MOD 30 MIN: CPT | Performed by: INTERNAL MEDICINE

## 2019-04-04 PROCEDURE — 84443 ASSAY THYROID STIM HORMONE: CPT | Performed by: INTERNAL MEDICINE

## 2019-04-04 RX ORDER — LEVOTHYROXINE SODIUM 200 UG/1
TABLET ORAL
Qty: 90 TABLET | Refills: 3 | Status: CANCELLED | OUTPATIENT
Start: 2019-04-04

## 2019-04-04 ASSESSMENT — MIFFLIN-ST. JEOR: SCORE: 1413.78

## 2019-04-04 NOTE — LETTER
"    4/4/2019         RE: Meeta Rowell  [address] [place] [address]        Dear Colleague,    Thank you for referring your patient, Meeta Rowell, to the [facility]. Please see a copy of my visit note below.    Name: Meeta Rowell  F/u for postsurgical hypothyroidism and PTC. IRINA  and Nancy Garcia.  HPI:  Meeta Rowell is a 48 year old female with:    1.  Thyroid cancer:  She initially presented for the evaluation of thyroid nodule, first noted in 2013.  She was previously seen for this by Dr. Berry and recalls FNA biopsy which was \"benign\".  Then  she noted enlarged neck lymph nodes.  Repeat FNA biopsy of the 3.0 cm right thyroid nodule showed follicular lesion of undetermined significance. She underwent total thyroidectomy 12/11/2015 after Frozen section confirmed a follicular lesion and a small papillary carcinoma of the thyroid .  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 No abnormal activity to suggest  metastatic thyroid cancer.  Follow-up neck ultrasound 7/2016: No enlarged or abnormal-appearing lymph nodes are appreciated in the right or left neck.  TG ( suppressed ) : 1.3 ( 1/27/16)  TG ( stimulated ) : 2.7 ( 2/3/16)  F/u : 8/2018- no mets identified    Postsurgical hypothyroidism:  She was started on Levothyroxine 150 mcg after surgery, dose was increased to 175, then increased to  dose 200 mcg qd.  CUrrent  Dose: 200 mcg 6 days a week and 100 mcg 1 day a week no major concerns today.  Feeling fine  Denies  S/p menopause.   Taking generic " levothyroxine.  Due for labs.  Chest pain, palpitations, tremors.  History of Crohn's disease and has on and off diarrhea.  Wt loss- intensional- 16 lbs in 3 months on weight watchers.  Wt Readings from Last 2 Encounters:   04/04/19 85.1 kg (187 lb 11.2 oz)   01/15/19 91.6 kg (202 lb)     Family history is positive for mother with hypothyroidism.    No family history of thyroid cancer.    No personal history of radiation exposure to the head or neck.    She has a history of Chron's disease and has been treated with Remicade for many years.    She is a nurse.        PMH/PSH:  Past Medical History:   Diagnosis Date     Cancer (H) 12/11/15    thyroid     Crohn's disease (H)      Depression      Endometriosis      GERD (gastroesophageal reflux disease)      Personal history of other genital system and obstetric disorders(V13.29)      Regional enteritis of small intestine (H) 2003    ileal disease     Thyroid disease     hypo     Past Surgical History:   Procedure Laterality Date     C NONSPECIFIC PROCEDURE  2000    s/p cholecystectomy     CHOLECYSTECTOMY       ENT SURGERY  12/2015    total thyroidectomy     THYROIDECTOMY N/A 12/11/2015    Procedure: THYROIDECTOMY;  Surgeon: Shari King MD;  Location: [facility] OR     Family Hx:  Family History   Problem Relation Age of Onset     Hypertension Mother      Cervical Cancer Mother      Gastrointestinal Disease Mother         diverticulitis     Cardiovascular Mother      Hypothyroidism Mother      Coronary Artery Disease Mother      Hypertension Father      Diabetes Paternal Grandmother      Cerebrovascular Disease Paternal Grandmother      Thyroid disease: Yes, mother         DM2: No         Autoimmune: DM1, SLE, RA, Vitiligo:  No    Social Hx:  Social History     Socioeconomic History     Marital status:      Spouse name: Not on file     Number of children: 2     Years of education: Not on file     Highest education level: Not on file   Occupational History      "Occupation: nurse     Employer: [employer]     Comment: cardiology nurse   Social Needs     Financial resource strain: Not on file     Food insecurity:     Worry: Not on file     Inability: Not on file     Transportation needs:     Medical: Not on file     Non-medical: Not on file   Tobacco Use     Smoking status: Never Smoker     Smokeless tobacco: Never Used   Substance and Sexual Activity     Alcohol use: Yes     Alcohol/week: 0.0 oz     Comment: once or twice a month     Drug use: No     Sexual activity: Yes     Partners: Male     Birth control/protection: Surgical, Pill     Comment: Vasectomy   Lifestyle     Physical activity:     Days per week: Not on file     Minutes per session: Not on file     Stress: Not on file   Relationships     Social connections:     Talks on phone: Not on file     Gets together: Not on file     Attends Caodaism service: Not on file     Active member of club or organization: Not on file     Attends meetings of clubs or organizations: Not on file     Relationship status: Not on file     Intimate partner violence:     Fear of current or ex partner: Not on file     Emotionally abused: Not on file     Physically abused: Not on file     Forced sexual activity: Not on file   Other Topics Concern     Parent/sibling w/ CABG, MI or angioplasty before 65F 55M? Not Asked   Social History Narrative    Older son, Atul,  , with developmental delay          MEDICATIONS:  has a current medication list which includes the following prescription(s): alprazolam, bupropion hcl er (xl), buspirone hcl, drospirenone-ethinyl estradiol, fluoxetine, infliximab, lansoprazole, levothyroxine, propranolol er, and trazodone.    Review of Systems  10 point ROS neg other than the symptoms noted above in the HPI.    Physical Exam   VS: /74 (BP Location: Right arm, Patient Position: Chair, Cuff Size: Adult Large)   Pulse 81   Temp 98.5  F (36.9  C) (Oral)   Ht 1.549 m (5' 1\")   Wt 85.1 " kg (187 lb 11.2 oz)   SpO2 96%   Breastfeeding? No   BMI 35.47 kg/m     GENERAL: NAD, well dressed, answering questions appropriately, appears stated age.  HEENT: OP clear, no exophthalmos, no proptosis, EOMI, no lig lag, no retraction, no scleral icterus  NECK:  Supply, thyroid bed palpably empty, no lymphadenopathy, well healed horizontal scar across the lower neck.  RESPIRATORY: Normal respiratory effort.  CARDIOVASCULAR: No peripheral edema.  PSYCH: Intact judgment and insight. A&OX3 with a cordial affect.    LABS:  TG:  TG ( suppressed ) : 1.3 ( 1/27/16), TSH 7.83  TG ( stimulated ) : 2.7 ( 2/3/16), TSH 1.28    6/2016:  TSH: 1.28  JUAN: <0.4  TG: <0.1    12/2016:  TSH: 0.62  JUAN: <0.4  TG: <0.1    [facility]  [facility] 1/15/2019 8/8/2018   TSH 0.40 - 4.00 mU/L 0.17 (L) 0.23 (L)   T4 FREE 0.76 - 1.46 ng/dL 1.57 (H) 1.25     [facility] 12/28/2017 6/15/2017   TSH 0.40 - 4.00 mU/L 0.02 (L) 0.27 (L)   T4 FREE 0.76 - 1.46 ng/dL 1.58 (H) 1.34   FREE T3 2.3 - 4.2 pg/mL 2.9      [facility] 12/15/2016   TSH 0.40 - 4.00 mU/L 0.62   T4 FREE 0.76 - 1.46 ng/dL 1.32   FREE T3 2.3 - 4.2 pg/mL 2.2 (L)     [facility] 6/28/2016   TSH 0.40 - 4.00 mU/L 1.28   T4 FREE 0.76 - 1.46 ng/dL 1.13     !THYROID Latest Ref Rng 4/25/2016 1/27/2016 10/14/2015 9/1/2015   TSH 0.40 - 4.00 mU/L 3.74 7.83 (H) 3.79 4.77 (H)   T4 FREE 0.76 - 1.46 ng/dL 1.07 1.10 0.94 0.95     !THYROID Latest Ref Rng 5/22/2015   TSH 0.40 - 4.00 mU/L 5.92 (H)   T4 FREE 0.76 - 1.46 ng/dL 1.52 (H)       FINAL DIAGNOSIS:  A, B, and C.  Specimens: Right thyroid lobe, prelaryngeal tissue, left thyroid  lobe.  Procedure: Total thyroidectomy.  Received: Fresh.  Specimen Integrity: Divided (thyroidectomy performed as  lobectomy and completion thyroidectomy).  Tumor Focality: Four (4) foci of microcarcinomas .  Tumor Laterality: Right lobe (x2) and left  lobe (x2).    Tumor Histologic Type (all tumors): Papillary microcarcinoma;  follicular variant, infiltrative.    Tumor Size:  Tumor #1 (right lobe): 0.8 cm.  Tumor #2 (right lobe): 0.6 cm.  Tumor #3 (left lobe): 0.2 cm.  Tumor #4 (left lobe): 0.25 cm.    Margins:  Tumor #1: Involved right anterolateral margin over an area of 0.4  cm.  Tumor #2: Tumor within 0.1 cm of the nearest (right anterolateral)  margin.  Tumor #3: Tumor within fraction of a mm of the left anterolateral  and medial margins.  Tumor #4: Tumor within fraction of a millimeter of the left  anterolateral margin.    Regional Lymph Nodes: No nodes submitted or found.    Pathologic Staging (pTNM): pT1a(m), pNX, pM not applicable.    Additional Pathologic Findings:  -Hyperplastic nodules (largest 2 cm wide; within right lobe).  -Single normal-appearing parathyroid gland identified within left  lobe, mid region.  -Prelaryngeal tissue (specimen C) comprises of benign fibrovascular  tissue without lymph nodes.  Prior biopsy site changes.      NM I-131 THYROID THERAPY  2/3/2016 9:38 AM     HISTORY: Thyroid cancer.     TECHNIQUE: Patient was given 100 mCi I-131orally and instructed in  home care.         IMPRESSION: Successful I-131 therapy administration.       NUCLEAR MEDICINE I-131 WHOLE BODY SCAN February 10, 2016 8:08 AM       HISTORY: Postprocedural hypothyroidism. Hypocalcemia. Malignant  neoplasm of thyroid gland, follow-up as     COMPARISON: None.     TECHNIQUE: Patient was given I-131 orally prior to the scan followed  by whole body scan.       DOSE: Seven days previously the patient received 106.6 mCi I-131.     FINDINGS: Physiologic activity noted. No abnormal activity to suggest  metastatic thyroid cancer.           IMPRESSION: No metastatic disease demonstrated.    Follow up Neck US 7/2016:  HEAD AND NECK SOFT TISSUE ULTRASOUND  7/18/2016  2:44 PM       HISTORY: Postprocedural hypothyroidism, Malignant neoplasm of thyroid  gland       FINDINGS:  Anterior neck ultrasound shows no residual thyroid tissue.  Multiple bilateral normal-appearing lymph nodes are present. No  enlarged or abnormal-appearing lymph nodes are evident in the right or  left neck.                                                                       IMPRESSION: No enlarged or abnormal-appearing lymph nodes are  appreciated in the right or left neck.    WBC I123:  NUCLEAR MEDICINE I-123 WHOLE BODY SCAN  August 16, 2018 12:06 PM      HISTORY: Postsurgical hypothyroidism. Thyroid cancer (H). Status post  total thyroidectomy.     COMPARISON: None.     TECHNIQUE: Patient was given I-123 orally followed by whole body scan.        DOSE: 2.2mci I123 in 8 capsules on 8/15/18 by [facility]     FINDINGS: Physiologic activity noted. No abnormal activity to suggest  metastatic thyroid cancer.                                                                       IMPRESSION: No metastatic disease demonstrated.        All pertinent notes, labs and images personally reviewed by me.     A/P  Ms.Heather SERGE Rowell is a 45 year old here for the evaluation of:    1.  PTC:   FNA biopsy of the 3.0 cm right thyroid nodule showed follicular lesion of undetermined significance. 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 No abnormal activity to suggest metastatic thyroid cancer.  Follow up US 6/2016- no mets  F/u I123 scan 8/2018- no mets  -- TG appear stable  Plan:   Discussed  diagnosis, pathophysiology, management and treatment options of condition with pt.  Plan to get TG and Neck US in 4-6 months    #2. Postsurgical hypothyroidism:    CUrrent  Dose: 200 mcg 6 days a week and 100 mcg 1 day a week no major concerns today.  Generic.   Clinically looks euthyroid. Goal TSH <1.0  + intentional wt loss  Plan:  Get labs  Dose change based on that  New Rx based on that.    Discussed s/s of hypothyroidism and hyperthyroidism to watch for.  The patient indicates understanding of these issues and agrees with the plan.      Follow-up:  After US in 4-6 months    Alice Donis MD  Endocrinology   [facility]    Addendum to above note and clinic visit:    Labs reviewed.    See result note/telephone encounter.                Again, thank you for allowing me to participate in the care of your patient.        Sincerely,        Alice Donis MD

## 2019-04-04 NOTE — NURSING NOTE
ENDOCRINOLOGY INTAKE FORM    Patient Name:  Meeta Rowell  :  1970    Is patient Diabetic?   No  Does patient have non-diabetic or other endocrine issues?  Yes: thyroid ca., s/p total thyroidectomy    Vitals: There were no vitals taken for this visit.  BMI= There is no height or weight on file to calculate BMI.    Flu vaccine:  Yes: 18  Pneumonia vaccine:  Yes: both    Smoking and Alcohol use:  Social History     Tobacco Use     Smoking status: Never Smoker     Smokeless tobacco: Never Used   Substance Use Topics     Alcohol use: Yes     Alcohol/week: 0.0 oz     Comment: once or twice a month     Drug use: No       Shweta Lozada CMA  [facility]  [place]

## 2019-04-04 NOTE — PATIENT INSTRUCTIONS
[facility]   Dr Donis, Endocrinology Department      [facility]   [address]  [place] [address]  Appointment [phone]  Fax: [phone]  [place]: Monday and Tuesday         [facility]   [address]  [place] [address]  Appointment [phone]  Fax: [phone]  [place]: Wednesday and Thursday            Labs today  Dose change based on labs  Will send a new Rx based on that    Repeat labs and ULtrasound neck in August-Sep 2019  Follow up after that     [facility] radiology scheduleing   [place]  [phone]   [facility] Radiology scheduling  [place]  [phone]     Please call and schedule the recommended test as discussed in clinic visit. These are the numbers to call.    Take Levothyroxine on an empty stomach. Take it with a full glass of water at least 30 minutes to 1 hour before eating breakfast.   This medicine should be taken at least 4 hours before or 4 hours after these medicines: antacids (Maalox , Mylanta , Tums ), calcium supplements, cholestyramine (Prevalite , Questran ), colestipol (Colestid ), iron supplements, orlistat (Arjun , Xenical ), simethicone (Gas-X , Mylicon ), and sucralfate (Carafate ).   Swallow the capsule whole. Do not cut or crush it.

## 2019-04-04 NOTE — PROGRESS NOTES
"Name: Meeta Rowell  F/u for postsurgical hypothyroidism and PTC. IRINA  and Nancy Garcia.  HPI:  Meeta Rowell is a 48 year old female with:    1.  Thyroid cancer:  She initially presented for the evaluation of thyroid nodule, first noted in 2013.  She was previously seen for this by Dr. Berry and recalls FNA biopsy which was \"benign\".  Then  she noted enlarged neck lymph nodes.  Repeat FNA biopsy of the 3.0 cm right thyroid nodule showed follicular lesion of undetermined significance. She underwent total thyroidectomy 12/11/2015 after Frozen section confirmed a follicular lesion and a small papillary carcinoma of the thyroid .  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 No abnormal activity to suggest  metastatic thyroid cancer.  Follow-up neck ultrasound 7/2016: No enlarged or abnormal-appearing lymph nodes are appreciated in the right or left neck.  TG ( suppressed ) : 1.3 ( 1/27/16)  TG ( stimulated ) : 2.7 ( 2/3/16)  F/u : 8/2018- no mets identified    Postsurgical hypothyroidism:  She was started on Levothyroxine 150 mcg after surgery, dose was increased to 175, then increased to  dose 200 mcg qd.  CUrrent  Dose: 200 mcg 6 days a week and 100 mcg 1 day a week no major concerns today.  Feeling fine  Denies  S/p menopause.   Taking generic levothyroxine.  Due for labs.  Chest pain, palpitations, tremors.  History of Crohn's disease and has on and off diarrhea.  Wt loss- intensional- 16 lbs in 3 months on weight watchers.  Wt Readings from Last 2 Encounters:   04/04/19 85.1 kg (187 lb " 11.2 oz)   01/15/19 91.6 kg (202 lb)     Family history is positive for mother with hypothyroidism.    No family history of thyroid cancer.    No personal history of radiation exposure to the head or neck.    She has a history of Chron's disease and has been treated with Remicade for many years.    She is a nurse.        PMH/PSH:  Past Medical History:   Diagnosis Date     Cancer (H) 12/11/15    thyroid     Crohn's disease (H)      Depression      Endometriosis      GERD (gastroesophageal reflux disease)      Personal history of other genital system and obstetric disorders(V13.29)      Regional enteritis of small intestine (H) 2003    ileal disease     Thyroid disease     hypo     Past Surgical History:   Procedure Laterality Date     C NONSPECIFIC PROCEDURE  2000    s/p cholecystectomy     CHOLECYSTECTOMY       ENT SURGERY  12/2015    total thyroidectomy     THYROIDECTOMY N/A 12/11/2015    Procedure: THYROIDECTOMY;  Surgeon: Shari King MD;  Location: [facility] OR     Family Hx:  Family History   Problem Relation Age of Onset     Hypertension Mother      Cervical Cancer Mother      Gastrointestinal Disease Mother         diverticulitis     Cardiovascular Mother      Hypothyroidism Mother      Coronary Artery Disease Mother      Hypertension Father      Diabetes Paternal Grandmother      Cerebrovascular Disease Paternal Grandmother      Thyroid disease: Yes, mother         DM2: No         Autoimmune: DM1, SLE, RA, Vitiligo:  No    Social Hx:  Social History     Socioeconomic History     Marital status:      Spouse name: Not on file     Number of children: 2     Years of education: Not on file     Highest education level: Not on file   Occupational History     Occupation: nurse     Employer: [employer]     Comment: cardiology nurse   Social Needs     Financial resource strain: Not on file     Food insecurity:     Worry: Not on file     Inability: Not on file     Transportation needs:      "Medical: Not on file     Non-medical: Not on file   Tobacco Use     Smoking status: Never Smoker     Smokeless tobacco: Never Used   Substance and Sexual Activity     Alcohol use: Yes     Alcohol/week: 0.0 oz     Comment: once or twice a month     Drug use: No     Sexual activity: Yes     Partners: Male     Birth control/protection: Surgical, Pill     Comment: Vasectomy   Lifestyle     Physical activity:     Days per week: Not on file     Minutes per session: Not on file     Stress: Not on file   Relationships     Social connections:     Talks on phone: Not on file     Gets together: Not on file     Attends Sikhism service: Not on file     Active member of club or organization: Not on file     Attends meetings of clubs or organizations: Not on file     Relationship status: Not on file     Intimate partner violence:     Fear of current or ex partner: Not on file     Emotionally abused: Not on file     Physically abused: Not on file     Forced sexual activity: Not on file   Other Topics Concern     Parent/sibling w/ CABG, MI or angioplasty before 65F 55M? Not Asked   Social History Narrative    Older son, Atul,  , with developmental delay          MEDICATIONS:  has a current medication list which includes the following prescription(s): alprazolam, bupropion hcl er (xl), buspirone hcl, drospirenone-ethinyl estradiol, fluoxetine, infliximab, lansoprazole, levothyroxine, propranolol er, and trazodone.    Review of Systems  10 point ROS neg other than the symptoms noted above in the HPI.    Physical Exam   VS: /74 (BP Location: Right arm, Patient Position: Chair, Cuff Size: Adult Large)   Pulse 81   Temp 98.5  F (36.9  C) (Oral)   Ht 1.549 m (5' 1\")   Wt 85.1 kg (187 lb 11.2 oz)   SpO2 96%   Breastfeeding? No   BMI 35.47 kg/m    GENERAL: NAD, well dressed, answering questions appropriately, appears stated age.  HEENT: OP clear, no exophthalmos, no proptosis, EOMI, no lig lag, no retraction, no " scleral icterus  NECK:  Supply, thyroid bed palpably empty, no lymphadenopathy, well healed horizontal scar across the lower neck.  RESPIRATORY: Normal respiratory effort.  CARDIOVASCULAR: No peripheral edema.  PSYCH: Intact judgment and insight. A&OX3 with a cordial affect.    LABS:  TG:  TG ( suppressed ) : 1.3 ( 1/27/16), TSH 7.83  TG ( stimulated ) : 2.7 ( 2/3/16), TSH 1.28    6/2016:  TSH: 1.28  JUAN: <0.4  TG: <0.1    12/2016:  TSH: 0.62  JUAN: <0.4  TG: <0.1    [facility]:  [facility] 1/15/2019 8/8/2018   TSH 0.40 - 4.00 mU/L 0.17 (L) 0.23 (L)   T4 FREE 0.76 - 1.46 ng/dL 1.57 (H) 1.25     [facility] 12/28/2017 6/15/2017   TSH 0.40 - 4.00 mU/L 0.02 (L) 0.27 (L)   T4 FREE 0.76 - 1.46 ng/dL 1.58 (H) 1.34   FREE T3 2.3 - 4.2 pg/mL 2.9      [facility] 12/15/2016   TSH 0.40 - 4.00 mU/L 0.62   T4 FREE 0.76 - 1.46 ng/dL 1.32   FREE T3 2.3 - 4.2 pg/mL 2.2 (L)     [facility] 6/28/2016   TSH 0.40 - 4.00 mU/L 1.28   T4 FREE 0.76 - 1.46 ng/dL 1.13     !THYROID Latest Ref Rng 4/25/2016 1/27/2016 10/14/2015 9/1/2015   TSH 0.40 - 4.00 mU/L 3.74 7.83 (H) 3.79 4.77 (H)   T4 FREE 0.76 - 1.46 ng/dL 1.07 1.10 0.94 0.95     !THYROID Latest Ref Rng 5/22/2015   TSH 0.40 - 4.00 mU/L 5.92 (H)   T4 FREE 0.76 - 1.46 ng/dL 1.52 (H)       FINAL DIAGNOSIS:  A, B, and C.  Specimens: Right thyroid lobe, prelaryngeal tissue, left thyroid  lobe.  Procedure: Total thyroidectomy.  Received: Fresh.  Specimen Integrity: Divided (thyroidectomy performed as  lobectomy and completion thyroidectomy).  Tumor Focality: Four (4) foci of microcarcinomas .  Tumor Laterality: Right lobe (x2) and left lobe (x2).    Tumor Histologic Type (all tumors): Papillary microcarcinoma;  follicular variant, infiltrative.    Tumor Size:  Tumor #1 (right lobe): 0.8 cm.  Tumor #2 (right lobe): 0.6 cm.  Tumor #3 (left lobe): 0.2 cm.  Tumor #4 (left lobe):  0.25 cm.    Margins:  Tumor #1: Involved right anterolateral margin over an area of 0.4  cm.  Tumor #2: Tumor within 0.1 cm of the nearest (right anterolateral)  margin.  Tumor #3: Tumor within fraction of a mm of the left anterolateral  and medial margins.  Tumor #4: Tumor within fraction of a millimeter of the left  anterolateral margin.    Regional Lymph Nodes: No nodes submitted or found.    Pathologic Staging (pTNM): pT1a(m), pNX, pM not applicable.    Additional Pathologic Findings:  -Hyperplastic nodules (largest 2 cm wide; within right lobe).  -Single normal-appearing parathyroid gland identified within left  lobe, mid region.  -Prelaryngeal tissue (specimen C) comprises of benign fibrovascular  tissue without lymph nodes.  Prior biopsy site changes.      NM I-131 THYROID THERAPY  2/3/2016 9:38 AM     HISTORY: Thyroid cancer.     TECHNIQUE: Patient was given 100 mCi I-131orally and instructed in  home care.         IMPRESSION: Successful I-131 therapy administration.       NUCLEAR MEDICINE I-131 WHOLE BODY SCAN February 10, 2016 8:08 AM       HISTORY: Postprocedural hypothyroidism. Hypocalcemia. Malignant  neoplasm of thyroid gland, follow-up as     COMPARISON: None.     TECHNIQUE: Patient was given I-131 orally prior to the scan followed  by whole body scan.       DOSE: Seven days previously the patient received 106.6 mCi I-131.     FINDINGS: Physiologic activity noted. No abnormal activity to suggest  metastatic thyroid cancer.           IMPRESSION: No metastatic disease demonstrated.    Follow up Neck US 7/2016:  HEAD AND NECK SOFT TISSUE ULTRASOUND  7/18/2016  2:44 PM       HISTORY: Postprocedural hypothyroidism, Malignant neoplasm of thyroid  gland       FINDINGS: Anterior neck ultrasound shows no residual thyroid tissue.  Multiple bilateral normal-appearing lymph nodes are present. No  enlarged or abnormal-appearing lymph nodes are evident in the right or  left  neck.                                                                       IMPRESSION: No enlarged or abnormal-appearing lymph nodes are  appreciated in the right or left neck.    WBC I123:  NUCLEAR MEDICINE I-123 WHOLE BODY SCAN  August 16, 2018 12:06 PM      HISTORY: Postsurgical hypothyroidism. Thyroid cancer (H). Status post  total thyroidectomy.     COMPARISON: None.     TECHNIQUE: Patient was given I-123 orally followed by whole body scan.        DOSE: 2.2mci I123 in 8 capsules on 8/15/18 by LB     FINDINGS: Physiologic activity noted. No abnormal activity to suggest  metastatic thyroid cancer.                                                                       IMPRESSION: No metastatic disease demonstrated.        All pertinent notes, labs and images personally reviewed by me.     A/P  Ms.Heather SERGE Rowell is a 45 year old here for the evaluation of:    1.  PTC:   FNA biopsy of the 3.0 cm right thyroid nodule showed follicular lesion of undetermined significance. 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 No abnormal activity to suggest metastatic thyroid cancer.  Follow up US 6/2016- no mets  F/u I123 scan 8/2018- no mets  -- TG appear stable  Plan:   Discussed diagnosis, pathophysiology, management and treatment options of condition with pt.  Plan to get TG and Neck US in 4-6 months    #2. Postsurgical hypothyroidism:    CUrrent  Dose: 200 mcg 6 days a week  and 100 mcg 1 day a week no major concerns today.  Generic.   Clinically looks euthyroid. Goal TSH <1.0  + intentional wt loss  Plan:  Get labs  Dose change based on that  New Rx based on that.    Discussed s/s of hypothyroidism and hyperthyroidism to watch for.  The patient indicates understanding of these issues and agrees with the plan.      Follow-up:  After US in 4-6 months    Alice Donis MD  Endocrinology   [facility]    Addendum to above note and clinic visit:    Labs reviewed.    See result note/telephone encounter.

## 2019-04-05 LAB
T4 FREE SERPL-MCNC: 1.61 NG/DL (ref 0.76–1.46)
TSH SERPL DL<=0.005 MIU/L-ACNC: 0.1 MU/L (ref 0.4–4)

## 2019-04-09 ENCOUNTER — TELEPHONE (OUTPATIENT)
Dept: ENDOCRINOLOGY | Facility: CLINIC | Age: 49
End: 2019-04-09

## 2019-04-09 DIAGNOSIS — C73 THYROID CANCER (H): ICD-10-CM

## 2019-04-09 DIAGNOSIS — E89.0 S/P TOTAL THYROIDECTOMY: ICD-10-CM

## 2019-04-09 RX ORDER — LEVOTHYROXINE SODIUM 200 UG/1
TABLET ORAL
Qty: 90 TABLET | Refills: 1 | Status: SHIPPED | OUTPATIENT
Start: 2019-04-09 | End: 2019-12-23

## 2019-04-09 NOTE — TELEPHONE ENCOUNTER
ENDO THYROID LABS-[facility] Latest Ref Rng & Units 4/4/2019 1/15/2019   TSH 0.40 - 4.00 mU/L 0.10 (L) 0.17 (L)   T4 FREE 0.76 - 1.46 ng/dL 1.61 (H) 1.57 (H)     In the setting of thyroid cancer and no other clinical symptoms of over replacement plan to continue current dose of levothyroxine.  Rx sent.

## 2019-04-19 DIAGNOSIS — F33.1 MODERATE RECURRENT MAJOR DEPRESSION (H): ICD-10-CM

## 2019-04-19 NOTE — TELEPHONE ENCOUNTER
"Requested Prescriptions   Pending Prescriptions Disp Refills     FLUoxetine (PROZAC) 20 MG capsule [Pharmacy Med Name: FLUOXETINE HCL 20MG CAPS]  Last Fill Quantity: 270 capsules,  # refills: 0    Last office visit: 1/15/2019 with prescribing provider: Jesenia Weinberg MD    Future Office Visit:     270 capsule 0     Sig: TAKE THREE CAPSULES BY MOUTH ONCE DAILY       SSRIs Protocol Failed - 4/19/2019  5:50 PM        Failed - PHQ-9 score less than 5 in past 6 months     Please review last PHQ-9 score.   PHQ-9 SCORE 8/23/2018 8/23/2018 1/15/2019   PHQ-9 Total Score - - -   PHQ-9 Total Score MyChart - - 14 (Moderate depression)   PHQ-9 Total Score 9 9 14     JANIS-7 SCORE 8/23/2018 8/23/2018 3/29/2019   Total Score - - -   Total Score - - 4 (minimal anxiety)   Total Score 3 3 4               Passed - Medication is active on med list        Passed - Patient is age 18 or older        Passed - No active pregnancy on record        Passed - No positive pregnancy test in last 12 months        Passed - Recent (6 mo) or future (30 days) visit within the authorizing provider's specialty     Patient had office visit in the last 6 months or has a visit in the next 30 days with authorizing provider or within the authorizing provider's specialty.  See \"Patient Info\" tab in inbasket, or \"Choose Columns\" in Meds & Orders section of the refill encounter.              "

## 2019-04-22 ENCOUNTER — MYC MEDICAL ADVICE (OUTPATIENT)
Dept: PEDIATRICS | Facility: CLINIC | Age: 49
End: 2019-04-22

## 2019-04-22 ASSESSMENT — PATIENT HEALTH QUESTIONNAIRE - PHQ9
10. IF YOU CHECKED OFF ANY PROBLEMS, HOW DIFFICULT HAVE THESE PROBLEMS MADE IT FOR YOU TO DO YOUR WORK, TAKE CARE OF THINGS AT HOME, OR GET ALONG WITH OTHER PEOPLE: SOMEWHAT DIFFICULT
SUM OF ALL RESPONSES TO PHQ QUESTIONS 1-9: 6
SUM OF ALL RESPONSES TO PHQ QUESTIONS 1-9: 6

## 2019-04-22 NOTE — TELEPHONE ENCOUNTER
PHQ-9 score:    PHQ-9 SCORE 1/15/2019   PHQ-9 Total Score -   PHQ-9 Total Score MyChart 14 (Moderate depression)   PHQ-9 Total Score 14     PHQ-9 sent to patient.  Adwoa Lopez RN  Message handled by Nurse Triage.

## 2019-04-23 ASSESSMENT — PATIENT HEALTH QUESTIONNAIRE - PHQ9: SUM OF ALL RESPONSES TO PHQ QUESTIONS 1-9: 6

## 2019-04-23 NOTE — TELEPHONE ENCOUNTER
PHQ-9 score:    PHQ-9 SCORE 4/22/2019   PHQ-9 Total Score -   PHQ-9 Total Score MyChart 6 (Mild depression)   PHQ-9 Total Score 6     Addressed in the refill encounter and routed to provider per protocol.  Adwoa Lopez RN  Message handled by Nurse Triage.

## 2019-04-23 NOTE — TELEPHONE ENCOUNTER
PHQ-9 score:    PHQ-9 SCORE 4/22/2019   PHQ-9 Total Score -   PHQ-9 Total Score MyChart 6 (Mild depression)   PHQ-9 Total Score 6     Routing refill request to provider for review/approval because:  Score of PHQ-9    Adwoa Lopez RN  Message handled by Nurse Triage.

## 2019-05-15 ENCOUNTER — TRANSFERRED RECORDS (OUTPATIENT)
Dept: HEALTH INFORMATION MANAGEMENT | Facility: CLINIC | Age: 49
End: 2019-05-15

## 2019-07-25 DIAGNOSIS — F41.1 GENERALIZED ANXIETY DISORDER: ICD-10-CM

## 2019-07-25 NOTE — TELEPHONE ENCOUNTER
Pending Prescriptions:                       Disp   Refills    propranolol ER (INDERAL LA) 60 MG 24 hr c*90 cap*3            Sig: TAKE ONE CAPSULE BY MOUTH EVERY DAY    Routing refill request to provider for review/approval because:  A break in medication    Yane Lechuga RN, BSN

## 2019-07-25 NOTE — TELEPHONE ENCOUNTER
Please call pt - should have run out 6 months ago.  Is she only remembering every other day or was she off of them?  How is her anxiety?

## 2019-07-29 RX ORDER — PROPRANOLOL HCL 60 MG
CAPSULE, EXTENDED RELEASE 24HR ORAL
Qty: 90 CAPSULE | Refills: 1 | Status: SHIPPED | OUTPATIENT
Start: 2019-07-29 | End: 2020-01-21

## 2019-07-29 ASSESSMENT — ANXIETY QUESTIONNAIRES
IF YOU CHECKED OFF ANY PROBLEMS ON THIS QUESTIONNAIRE, HOW DIFFICULT HAVE THESE PROBLEMS MADE IT FOR YOU TO DO YOUR WORK, TAKE CARE OF THINGS AT HOME, OR GET ALONG WITH OTHER PEOPLE: NOT DIFFICULT AT ALL
6. BECOMING EASILY ANNOYED OR IRRITABLE: SEVERAL DAYS
GAD7 TOTAL SCORE: 2
7. FEELING AFRAID AS IF SOMETHING AWFUL MIGHT HAPPEN: NOT AT ALL
2. NOT BEING ABLE TO STOP OR CONTROL WORRYING: NOT AT ALL
3. WORRYING TOO MUCH ABOUT DIFFERENT THINGS: SEVERAL DAYS
1. FEELING NERVOUS, ANXIOUS, OR ON EDGE: NOT AT ALL
5. BEING SO RESTLESS THAT IT IS HARD TO SIT STILL: NOT AT ALL

## 2019-07-29 ASSESSMENT — PATIENT HEALTH QUESTIONNAIRE - PHQ9: 5. POOR APPETITE OR OVEREATING: NOT AT ALL

## 2019-07-29 NOTE — TELEPHONE ENCOUNTER
Spoke to patient and she stated that she has been taking this medciation daily.   GAD7 Complete.     PHQ-9 and GAD7 Scores 3/29/2019 4/22/2019 7/29/2019   PHQ9 TOTAL SCORE  6 (Mild depression)    PHQ-9 Total Score  6    JANIS-7 Total Score 4  2       Josie Schmitt RN Flex

## 2019-07-30 ASSESSMENT — ANXIETY QUESTIONNAIRES: GAD7 TOTAL SCORE: 2

## 2019-08-30 ENCOUNTER — MEDICAL CORRESPONDENCE (OUTPATIENT)
Dept: HEALTH INFORMATION MANAGEMENT | Facility: CLINIC | Age: 49
End: 2019-08-30

## 2019-09-25 ENCOUNTER — MYC REFILL (OUTPATIENT)
Dept: PEDIATRICS | Facility: CLINIC | Age: 49
End: 2019-09-25

## 2019-09-25 DIAGNOSIS — F41.9 ANXIETY: ICD-10-CM

## 2019-09-25 DIAGNOSIS — F40.243 FEAR OF FLYING: ICD-10-CM

## 2019-09-27 NOTE — TELEPHONE ENCOUNTER
Refill request: Xanax 0.25 mg.     Last Written Prescription Date:  3/29/19  Last Fill Quantity: 8 tablets,  # refills: 0   Last office visit: 1/15/2019 with prescribing provider:  1/15/19   Future Office Visit:  None scheduled     checked.  Medication last written: 3/29/19, 8 tablets.  Medication last filled: 3/29/19, 8 tablets  MD please review.     Jonathan Santiago RN on 9/27/2019 at 10:53 AM

## 2019-09-30 RX ORDER — ALPRAZOLAM 0.25 MG
.25-.5 TABLET ORAL 2 TIMES DAILY PRN
Qty: 8 TABLET | Refills: 0 | Status: SHIPPED | OUTPATIENT
Start: 2019-09-30 | End: 2019-12-23

## 2019-09-30 NOTE — TELEPHONE ENCOUNTER
Call placed to patient, she reports the medication is for flying.    Joan Mosqueda RN BSN   [facility]

## 2019-10-01 ENCOUNTER — HEALTH MAINTENANCE LETTER (OUTPATIENT)
Age: 49
End: 2019-10-01

## 2019-10-25 ENCOUNTER — TRANSFERRED RECORDS (OUTPATIENT)
Dept: HEALTH INFORMATION MANAGEMENT | Facility: CLINIC | Age: 49
End: 2019-10-25

## 2019-10-25 ENCOUNTER — MEDICAL CORRESPONDENCE (OUTPATIENT)
Dept: HEALTH INFORMATION MANAGEMENT | Facility: CLINIC | Age: 49
End: 2019-10-25

## 2019-12-15 ENCOUNTER — HEALTH MAINTENANCE LETTER (OUTPATIENT)
Age: 49
End: 2019-12-15

## 2019-12-21 DIAGNOSIS — E89.0 S/P TOTAL THYROIDECTOMY: ICD-10-CM

## 2019-12-21 DIAGNOSIS — C73 THYROID CANCER (H): ICD-10-CM

## 2019-12-23 ENCOUNTER — TELEPHONE (OUTPATIENT)
Dept: PEDIATRICS | Facility: CLINIC | Age: 49
End: 2019-12-23

## 2019-12-23 ENCOUNTER — MYC MEDICAL ADVICE (OUTPATIENT)
Dept: PEDIATRICS | Facility: CLINIC | Age: 49
End: 2019-12-23

## 2019-12-23 ENCOUNTER — MYC REFILL (OUTPATIENT)
Dept: PEDIATRICS | Facility: CLINIC | Age: 49
End: 2019-12-23

## 2019-12-23 DIAGNOSIS — F41.9 ANXIETY: ICD-10-CM

## 2019-12-23 DIAGNOSIS — F40.243 FEAR OF FLYING: ICD-10-CM

## 2019-12-23 RX ORDER — LEVOTHYROXINE SODIUM 200 UG/1
TABLET ORAL
Qty: 90 TABLET | Refills: 1 | Status: SHIPPED | OUTPATIENT
Start: 2019-12-23 | End: 2020-05-21

## 2019-12-23 ASSESSMENT — PATIENT HEALTH QUESTIONNAIRE - PHQ9
SUM OF ALL RESPONSES TO PHQ QUESTIONS 1-9: 7
SUM OF ALL RESPONSES TO PHQ QUESTIONS 1-9: 7
10. IF YOU CHECKED OFF ANY PROBLEMS, HOW DIFFICULT HAVE THESE PROBLEMS MADE IT FOR YOU TO DO YOUR WORK, TAKE CARE OF THINGS AT HOME, OR GET ALONG WITH OTHER PEOPLE: SOMEWHAT DIFFICULT

## 2019-12-23 NOTE — TELEPHONE ENCOUNTER
"Requested Prescriptions   Pending Prescriptions Disp Refills     levothyroxine (SYNTHROID/LEVOTHROID) 200 MCG tablet [Pharmacy Med Name: LEVOTHYROXINE SODIUM 200MCG TABS] 90 tablet 1     Sig: TAKE ONE TABLET (200MCG) BY MOUTH ONCE DAILY SIX DAYS PER WEEK AND TAKE ONE-HALF TABLET (100MCG) ONE DAY PER WEEK   Last Written Prescription Date:  04/09/19  Last Fill Quantity: 90,  # refills: 1   Last office visit: 4/4/2019 with prescribing provider:  yes   Future Office Visit:        Thyroid Protocol Failed - 12/21/2019 12:21 PM        Failed - Normal TSH on file in past 12 months     Recent Labs   Lab Test 04/04/19  1528   TSH 0.10*              Passed - Patient is 12 years or older        Passed - Recent (12 mo) or future (30 days) visit within the authorizing provider's specialty     Patient has had an office visit with the authorizing provider or a provider within the authorizing providers department within the previous 12 mos or has a future within next 30 days. See \"Patient Info\" tab in inbasket, or \"Choose Columns\" in Meds & Orders section of the refill encounter.              Passed - Medication is active on med list        Passed - No active pregnancy on record     If patient is pregnant or has had a positive pregnancy test, please check TSH.          Passed - No positive pregnancy test in past 12 months     If patient is pregnant or has had a positive pregnancy test, please check TSH.            "

## 2019-12-23 NOTE — TELEPHONE ENCOUNTER
Rx sent.    H/o thyroid cancer.  Recommend labs in 2-3 months and f/u.  Please make a lab appointment for blood work and follow up clinic appointment in 1 week after that to discuss results.    Please inform patient.

## 2019-12-23 NOTE — TELEPHONE ENCOUNTER
Type of outreach:  Sent [employer] message.  Health Maintenance Due   Topic Date Due     ANNUAL REVIEW OF HM ORDERS  1970     MAMMO SCREENING  1970     COLONOSCOPY  10/17/1979     INFLUENZA VACCINE (1) 09/01/2019     PHQ-9  10/22/2019     ZOSTER IMMUNIZATION (1 of 2) 10/17/2019     PREVENTIVE CARE VISIT  01/15/2020     Marla Melton LPN

## 2019-12-23 NOTE — TELEPHONE ENCOUNTER
Message sent via [employer].      Shweta Lozada CMA  [facility]  [place]

## 2019-12-24 ENCOUNTER — MYC MEDICAL ADVICE (OUTPATIENT)
Dept: PEDIATRICS | Facility: CLINIC | Age: 49
End: 2019-12-24

## 2019-12-24 RX ORDER — ALPRAZOLAM 0.25 MG
.25-.5 TABLET ORAL 2 TIMES DAILY PRN
Qty: 6 TABLET | Refills: 0 | Status: SHIPPED | OUTPATIENT
Start: 2019-12-24 | End: 2020-04-28

## 2019-12-24 ASSESSMENT — PATIENT HEALTH QUESTIONNAIRE - PHQ9: SUM OF ALL RESPONSES TO PHQ QUESTIONS 1-9: 7

## 2019-12-24 NOTE — TELEPHONE ENCOUNTER
PHQ-9 SCORE 1/15/2019 4/22/2019 12/23/2019   PHQ-9 Total Score - - -   PHQ-9 Total Score Nahum 14 (Moderate depression) 6 (Mild depression) 7 (Mild depression)   PHQ-9 Total Score 14 6 7      Due for physical, please call to schedule

## 2019-12-24 NOTE — TELEPHONE ENCOUNTER
ALPRAZolam (XANAX) 0.25 MG tablet  Last Written Prescription Date:  9/30/19  Last Fill Quantity: 8,  # refills: 0   Last office visit: 1/15/2019 with prescribing provider:  Dr. Weinberg   Future Office Visit:  N/A    Controlled Substance Refill Request for ALPRAZolam (XANAX) 0.25 MG tablet  Problem List Complete:  No     PROVIDER TO CONSIDER COMPLETION OF PROBLEM LIST AND OVERVIEW/CONTROLLED SUBSTANCE AGREEMENT    Last Written Prescription Date:  9/30/19  Last Fill Quantity: 8,   # refills: 0    THE MOST RECENT OFFICE VISIT MUST BE WITHIN THE PAST 3 MONTHS. AT LEAST ONE FACE TO FACE VISIT MUST OCCUR EVERY 6 MONTHS. ADDITIONAL VISITS CAN BE VIRTUAL.  (THIS STATEMENT SHOULD BE DELETED.)    Last Office Visit with [facility] [place] provider: 1/15/19    Future Office visit:     Controlled substance agreement:   Encounter-Level CSA:    There are no encounter-level csa.     Patient-Level CSA:    There are no patient-level csa.         Last Urine Drug Screen: No results found for: CDAUT, No results found for: COMDAT, No results found for: THC13, PCP13, COC13, MAMP13, OPI13, AMP13, BZO13, TCA13, MTD13, BAR13, OXY13, PPX13, BUP13     Processing:  Rx to be electronically transmitted to pharmacy by provider      [email or web address]       checked in past 3 months?  Yes No Concerns Noted    Ivon DIETZ RN, BSN

## 2019-12-24 NOTE — TELEPHONE ENCOUNTER
The pt is aware and scheduled for her upcoming appointment.   Barbara Lozada on 12/24/2019 at 11:44 AM

## 2019-12-30 ENCOUNTER — TRANSFERRED RECORDS (OUTPATIENT)
Dept: HEALTH INFORMATION MANAGEMENT | Facility: CLINIC | Age: 49
End: 2019-12-30

## 2020-01-14 DIAGNOSIS — K21.9 GASTROESOPHAGEAL REFLUX DISEASE WITHOUT ESOPHAGITIS: ICD-10-CM

## 2020-01-14 DIAGNOSIS — F33.1 MODERATE RECURRENT MAJOR DEPRESSION (H): ICD-10-CM

## 2020-01-16 DIAGNOSIS — F33.1 MODERATE RECURRENT MAJOR DEPRESSION (H): ICD-10-CM

## 2020-01-16 RX ORDER — MECOBALAMIN 5000 MCG
TABLET,DISINTEGRATING ORAL
Qty: 90 CAPSULE | Refills: 0 | Status: SHIPPED | OUTPATIENT
Start: 2020-01-16 | End: 2022-04-12

## 2020-01-16 RX ORDER — BUSPIRONE HYDROCHLORIDE 30 MG/1
TABLET ORAL
Qty: 180 TABLET | Refills: 0 | Status: SHIPPED | OUTPATIENT
Start: 2020-01-16 | End: 2020-04-09

## 2020-01-16 RX ORDER — BUPROPION HYDROCHLORIDE 150 MG/1
450 TABLET ORAL EVERY MORNING
Qty: 270 TABLET | Refills: 0 | Status: SHIPPED | OUTPATIENT
Start: 2020-01-16 | End: 2020-04-22

## 2020-01-16 RX ORDER — BUPROPION HYDROCHLORIDE 150 MG/1
TABLET ORAL
Qty: 270 TABLET | Refills: 3 | OUTPATIENT
Start: 2020-01-16

## 2020-01-16 NOTE — TELEPHONE ENCOUNTER
Prescription approved per [facility] Refill Protocol.- Lansoprazole esther given patient has appointment 3/31/20.     Apologies Rx for Bupropion originally refused in error. Correction Buspirone was sent today. This request is for Bupropion. (see below- provider needs to review)    Routing refill request to provider for review/approval because:  Out of range:  PHQ-9 >4 - Pharmacy requesting  buPROPion (WELLBUTRIN XL) 150 MG 24 hr tablet          Sig: TAKE THREE TABLETS (450MG) BY MOUTH EVERY MORNING    Disp:  270 tablet    Refills:  3     Future OV not scheduled until 3/31/20.    Meeta Torres RN  [facility]

## 2020-01-16 NOTE — TELEPHONE ENCOUNTER
"Requested Prescriptions   Pending Prescriptions Disp Refills     busPIRone HCl (BUSPAR) 30 MG tablet [Pharmacy Med Name: BUSPIRONE HCL 30MG TABS] 180 tablet 3     Sig: TAKE ONE TABLET BY MOUTH TWICE A DAY       Atypical Antidepressants Protocol Failed - 1/16/2020 12:29 AM        Failed - Patient has PHQ-9 score less than 5 in past 6 months.     Please review last PHQ-9 score.           Failed - Recent (6 mo) or future (30 days) visit within the authorizing provider's specialty     Patient had office visit in the last 6 months or has a visit in the next 30 days with authorizing provider or within the authorizing provider's specialty.  See \"Patient Info\" tab in inbasket, or \"Choose Columns\" in Meds & Orders section of the refill encounter.            Passed - Medication active on med list        Passed - Patient is age 18 or older        Passed - No active pregnancy on record        Passed - No positive pregnancy test in past 12 mos        PHQ-9 SCORE 1/15/2019 4/22/2019 12/23/2019   PHQ-9 Total Score - - -   PHQ-9 Total Score MyChart 14 (Moderate depression) 6 (Mild depression) 7 (Mild depression)   PHQ-9 Total Score 14 6 7     Routing refill request to provider for review/approval because:  Preethi given x1 and patient did not follow up, please advise  out of range:  phq9 >4        "

## 2020-01-17 DIAGNOSIS — F33.1 MODERATE RECURRENT MAJOR DEPRESSION (H): ICD-10-CM

## 2020-01-17 DIAGNOSIS — Z30.41 ENCOUNTER FOR SURVEILLANCE OF CONTRACEPTIVE PILLS: ICD-10-CM

## 2020-01-17 DIAGNOSIS — F41.1 GENERALIZED ANXIETY DISORDER: ICD-10-CM

## 2020-01-21 RX ORDER — DROSPIRENONE AND ETHINYL ESTRADIOL 0.03MG-3MG
KIT ORAL
Qty: 28 TABLET | Refills: 0 | Status: SHIPPED | OUTPATIENT
Start: 2020-01-21 | End: 2020-02-17

## 2020-01-21 RX ORDER — PROPRANOLOL HCL 60 MG
CAPSULE, EXTENDED RELEASE 24HR ORAL
Qty: 90 CAPSULE | Refills: 0 | Status: SHIPPED | OUTPATIENT
Start: 2020-01-21 | End: 2020-05-19

## 2020-01-21 NOTE — TELEPHONE ENCOUNTER
Medication is being filled for 1 time refill only due to:  Patient needs to be seen because it has been more than one year since last visit. - Yeny    Please call patient to schedule an appointment.      Routing refill request to provider for review/approval because:  Labs out of range:  phq9- prozac  A break in medication- eriberto Ortiz RN on 1/21/2020 at 8:32 AM

## 2020-01-21 NOTE — TELEPHONE ENCOUNTER
"Requested Prescriptions   Pending Prescriptions Disp Refills     FLUoxetine (PROZAC) 20 MG capsule [Pharmacy Med Name: FLUOXETINE HCL 20MG CAPS] 270 capsule 2     Sig: TAKE THREE CAPSULES BY MOUTH ONCE DAILY   Last Written Prescription Date:  4/23/19  Last Fill Quantity: 270,  # refills: 2   Last office visit: 1/15/2019 with prescribing provider:  Short   Future Office Visit:   Next 5 appointments (look out 90 days)    Mar 31, 2020  2:25 PM CDT  (Arrive by 2:05 PM)  Office Visit with Jesenia Weinberg MD, Ea Rn Pal 3a  [facility] [address] [place] [address]  [address]  [facility] [address]  [phone]             SSRIs Protocol Failed - 1/17/2020  6:50 PM        Failed - PHQ-9 score less than 5 in past 6 months     Please review last PHQ-9 score.   PHQ-9 score:    PHQ-9 SCORE 12/23/2019   PHQ-9 Total Score -   PHQ-9 Total Score MyChart 7 (Mild depression)   PHQ-9 Total Score 7           Failed - Recent (6 mo) or future (30 days) visit within the authorizing provider's specialty     Patient had office visit in the last 6 months or has a visit in the next 30 days with authorizing provider or within the authorizing provider's specialty.  See \"Patient Info\" tab in inbasket, or \"Choose Columns\" in Meds & Orders section of the refill encounter.            Passed - Medication is active on med list        Passed - Patient is age 18 or older        Passed - No active pregnancy on record        Passed - No positive pregnancy test in last 12 months        propranolol ER (INDERAL LA) 60 MG 24 hr capsule [Pharmacy Med Name: PROPRANOLOL HCL ER 60MG CP24] 90 capsule 1     Sig: TAKE ONE CAPSULE BY MOUTH EVERY DAY   Last Written Prescription Date:  7/25/18  Last Fill Quantity: 90,  # refills: 1   Last office visit: 1/15/2019 with prescribing provider:  Short   Future Office Visit:   Next 5 appointments (look out 90 days)    Mar 31, 2020  2:25 PM CDT  (Arrive by 2:05 PM)  Office Visit with Jesenia" "AALIYAH Weinberg MD, Ricardo Rn Pal 3a  [facility] [address] [place] [address]  [address]  [place] [address] [phone]             Beta-Blockers Protocol Failed - 1/17/2020  6:50 PM        Failed - Recent (12 mo) or future (30 days) visit within the authorizing provider's specialty     Patient has had an office visit with the authorizing provider or a provider within the authorizing providers department within the previous 12 mos or has a future within next 30 days. See \"Patient Info\" tab in inbasket, or \"Choose Columns\" in Meds & Orders section of the refill encounter.              Passed - Blood pressure under 140/90 in past 12 months     BP Readings from Last 3 Encounters:   04/04/19 116/74   01/15/19 114/76   08/22/18 124/70           Passed - Patient is age 6 or older        Passed - Medication is active on med list        drospirenone-ethinyl estradiol (KANA) 3-0.03 MG tablet [Pharmacy Med Name: DROSPIRENONE-ETHINYL ES 3-0.03 TABS] 112 tablet 4     Sig: TAKE ONE ACTIVE TABLET BY MOUTH ONCE DAILY. TAKE CONTINUOUSLY.   Last Written Prescription Date:  1/15/19  Last Fill Quantity: 112,  # refills: 4   Last office visit: 1/15/2019 with prescribing provider:  Short   Future Office Visit:   Next 5 appointments (look out 90 days)    Mar 31, 2020  2:25 PM CDT  (Arrive by 2:05 PM)  Office Visit with Jesenia Weinberg MD, Ea Rn Pal 3a  [facility]) [address] [place] [address]  [address]  [place] [address] [phone]             Contraceptives Protocol Failed - 1/17/2020  6:50 PM        Failed - Recent (12 mo) or future (30 days) visit within the authorizing provider's specialty     Patient has had an office visit with the authorizing provider or a provider within the authorizing providers department within the previous 12 mos or has a future within next 30 days. See \"Patient Info\" tab in inbasket, or \"Choose Columns\" in Meds & Orders section of " the refill encounter.              Passed - Patient is not a current smoker if age is 35 or older        Passed - Medication is active on med list        Passed - No active pregnancy on record        Passed - No positive pregnancy test in past 12 months

## 2020-02-16 DIAGNOSIS — Z30.41 ENCOUNTER FOR SURVEILLANCE OF CONTRACEPTIVE PILLS: ICD-10-CM

## 2020-02-17 ENCOUNTER — TRANSFERRED RECORDS (OUTPATIENT)
Dept: HEALTH INFORMATION MANAGEMENT | Facility: CLINIC | Age: 50
End: 2020-02-17

## 2020-02-17 RX ORDER — DROSPIRENONE AND ETHINYL ESTRADIOL 0.03MG-3MG
KIT ORAL
Qty: 28 TABLET | Refills: 1 | Status: SHIPPED | OUTPATIENT
Start: 2020-02-17 | End: 2020-03-23

## 2020-02-17 NOTE — TELEPHONE ENCOUNTER
"    Requested Prescriptions   Pending Prescriptions Disp Refills     drospirenone-ethinyl estradiol (KANA) 3-0.03 MG tablet [Pharmacy Med Name: DROSPIRENONE-ETHINYL ES 3-0.03 TABS] 28 tablet 0     Sig: TAKE ONE ACTIVE TABLET BY MOUTH ONCE DAILY CONTINUOUSLY. (NEED TO BE SEEN IN CLINIC FOR FURTHER REFILLS)       Contraceptives Protocol Failed - 2/16/2020  4:16 PM        Failed - Recent (12 mo) or future (30 days) visit within the authorizing provider's specialty     Patient has had an office visit with the authorizing provider or a provider within the authorizing providers department within the previous 12 mos or has a future within next 30 days. See \"Patient Info\" tab in inbasket, or \"Choose Columns\" in Meds & Orders section of the refill encounter.              Passed - Patient is not a current smoker if age is 35 or older        Passed - Medication is active on med list        Passed - No active pregnancy on record        Passed - No positive pregnancy test in past 12 months        Last Written Prescription Date:    Last Fill Quantity: ,  # refills:    Last office visit: 1/15/2019 with prescribing provider:    Future Office Visit:   Next 5 appointments (look out 90 days)    Mar 31, 2020  2:25 PM CDT  (Arrive by 2:05 PM)  Office Visit with Jesenia Weinberg MD, Ricardo Rn Pal 3a  [facility] [address] [place] [address] [phone]         a  "

## 2020-03-10 DIAGNOSIS — F51.04 PSYCHOPHYSIOLOGICAL INSOMNIA: ICD-10-CM

## 2020-03-11 RX ORDER — TRAZODONE HYDROCHLORIDE 100 MG/1
TABLET ORAL
Qty: 360 TABLET | Refills: 0 | Status: SHIPPED | OUTPATIENT
Start: 2020-03-11 | End: 2020-05-19

## 2020-03-11 NOTE — TELEPHONE ENCOUNTER
Prescription refilled x 1 per [facility] Refill Protocol.    Next 5 appointments (look out 90 days)    Mar 31, 2020  2:25 PM CDT  (Arrive by 2:05 PM)  Office Visit with Jesenia Weinberg MD, Ricardo Rn Pal 3a  [facility] [address] [place] [address] [address]  [facility] [address]  [phone]   May 13, 2020  3:00 PM CDT  Return Visit with Alice Donis MD  [facility] [address]  [facility] [address] [phone]

## 2020-03-21 DIAGNOSIS — Z30.41 ENCOUNTER FOR SURVEILLANCE OF CONTRACEPTIVE PILLS: ICD-10-CM

## 2020-03-22 ENCOUNTER — HEALTH MAINTENANCE LETTER (OUTPATIENT)
Age: 50
End: 2020-03-22

## 2020-03-23 RX ORDER — DROSPIRENONE AND ETHINYL ESTRADIOL 0.03MG-3MG
KIT ORAL
Qty: 84 TABLET | Refills: 0 | Status: SHIPPED | OUTPATIENT
Start: 2020-03-23 | End: 2020-05-19

## 2020-03-24 ENCOUNTER — TELEPHONE (OUTPATIENT)
Dept: PEDIATRICS | Facility: CLINIC | Age: 50
End: 2020-03-24

## 2020-04-08 DIAGNOSIS — F33.1 MODERATE RECURRENT MAJOR DEPRESSION (H): ICD-10-CM

## 2020-04-09 RX ORDER — BUSPIRONE HYDROCHLORIDE 30 MG/1
TABLET ORAL
Qty: 180 TABLET | Refills: 0 | Status: SHIPPED | OUTPATIENT
Start: 2020-04-09 | End: 2020-04-28

## 2020-04-09 NOTE — TELEPHONE ENCOUNTER
Routing refill request to provider for review/approval because:  Patient needs to be seen because it has been more than 1 year since last office visit.  Patient over due for MH follow up, was due 1yr ago.   PHQ9 >5.

## 2020-04-13 ENCOUNTER — TRANSFERRED RECORDS (OUTPATIENT)
Dept: HEALTH INFORMATION MANAGEMENT | Facility: CLINIC | Age: 50
End: 2020-04-13

## 2020-04-13 LAB
ALT SERPL-CCNC: 17 U/L (ref 0–78)
AST SERPL-CCNC: 18 U/L (ref 0–37)

## 2020-04-21 ENCOUNTER — MYC REFILL (OUTPATIENT)
Dept: PEDIATRICS | Facility: CLINIC | Age: 50
End: 2020-04-21

## 2020-04-21 DIAGNOSIS — F33.1 MODERATE RECURRENT MAJOR DEPRESSION (H): ICD-10-CM

## 2020-04-21 RX ORDER — BUPROPION HYDROCHLORIDE 150 MG/1
450 TABLET ORAL EVERY MORNING
Qty: 270 TABLET | Refills: 0 | Status: CANCELLED | OUTPATIENT
Start: 2020-04-21

## 2020-04-22 ENCOUNTER — MYC MEDICAL ADVICE (OUTPATIENT)
Dept: PEDIATRICS | Facility: CLINIC | Age: 50
End: 2020-04-22

## 2020-04-22 ENCOUNTER — MYC REFILL (OUTPATIENT)
Dept: PEDIATRICS | Facility: CLINIC | Age: 50
End: 2020-04-22

## 2020-04-22 DIAGNOSIS — F33.1 MODERATE RECURRENT MAJOR DEPRESSION (H): ICD-10-CM

## 2020-04-22 RX ORDER — BUPROPION HYDROCHLORIDE 150 MG/1
450 TABLET ORAL EVERY MORNING
Qty: 270 TABLET | Refills: 0 | Status: CANCELLED | OUTPATIENT
Start: 2020-04-22

## 2020-04-27 NOTE — TELEPHONE ENCOUNTER
Routing to MA/TC pool. Please check if the Pt has enough medication to get him through until visit tomorrow.     Toya Oakley RN   [facility] -- Triage Nurse

## 2020-04-28 ENCOUNTER — VIRTUAL VISIT (OUTPATIENT)
Dept: PEDIATRICS | Facility: CLINIC | Age: 50
End: 2020-04-28
Payer: COMMERCIAL

## 2020-04-28 DIAGNOSIS — K50.90 CROHN'S DISEASE WITHOUT COMPLICATION, UNSPECIFIED GASTROINTESTINAL TRACT LOCATION (H): ICD-10-CM

## 2020-04-28 DIAGNOSIS — F40.243 FEAR OF FLYING: ICD-10-CM

## 2020-04-28 DIAGNOSIS — F33.1 MODERATE RECURRENT MAJOR DEPRESSION (H): Primary | ICD-10-CM

## 2020-04-28 DIAGNOSIS — D84.9 IMMUNOSUPPRESSED STATUS (H): ICD-10-CM

## 2020-04-28 DIAGNOSIS — F41.1 GENERALIZED ANXIETY DISORDER: ICD-10-CM

## 2020-04-28 PROCEDURE — 96127 BRIEF EMOTIONAL/BEHAV ASSMT: CPT | Performed by: INTERNAL MEDICINE

## 2020-04-28 PROCEDURE — 99213 OFFICE O/P EST LOW 20 MIN: CPT | Mod: 95 | Performed by: INTERNAL MEDICINE

## 2020-04-28 RX ORDER — BUSPIRONE HYDROCHLORIDE 30 MG/1
30 TABLET ORAL 2 TIMES DAILY
Qty: 180 TABLET | Refills: 3 | Status: SHIPPED | OUTPATIENT
Start: 2020-04-28 | End: 2021-02-23

## 2020-04-28 RX ORDER — ALPRAZOLAM 0.25 MG
.25-.5 TABLET ORAL 2 TIMES DAILY PRN
Qty: 8 TABLET | Refills: 0 | Status: SHIPPED | OUTPATIENT
Start: 2020-04-28 | End: 2020-09-04

## 2020-04-28 RX ORDER — BUPROPION HYDROCHLORIDE 150 MG/1
TABLET ORAL
Qty: 90 TABLET | Refills: 3 | Status: SHIPPED | OUTPATIENT
Start: 2020-04-28 | End: 2020-09-03

## 2020-04-28 RX ORDER — PROPRANOLOL HCL 60 MG
60 CAPSULE, EXTENDED RELEASE 24HR ORAL DAILY
Qty: 90 CAPSULE | Refills: 3 | Status: SHIPPED | OUTPATIENT
Start: 2020-04-28 | End: 2020-05-19

## 2020-04-28 ASSESSMENT — ANXIETY QUESTIONNAIRES
5. BEING SO RESTLESS THAT IT IS HARD TO SIT STILL: NOT AT ALL
1. FEELING NERVOUS, ANXIOUS, OR ON EDGE: NOT AT ALL
GAD7 TOTAL SCORE: 1
7. FEELING AFRAID AS IF SOMETHING AWFUL MIGHT HAPPEN: NOT AT ALL
3. WORRYING TOO MUCH ABOUT DIFFERENT THINGS: NOT AT ALL
2. NOT BEING ABLE TO STOP OR CONTROL WORRYING: SEVERAL DAYS
IF YOU CHECKED OFF ANY PROBLEMS ON THIS QUESTIONNAIRE, HOW DIFFICULT HAVE THESE PROBLEMS MADE IT FOR YOU TO DO YOUR WORK, TAKE CARE OF THINGS AT HOME, OR GET ALONG WITH OTHER PEOPLE: NOT DIFFICULT AT ALL
6. BECOMING EASILY ANNOYED OR IRRITABLE: NOT AT ALL

## 2020-04-28 ASSESSMENT — PATIENT HEALTH QUESTIONNAIRE - PHQ9
5. POOR APPETITE OR OVEREATING: NOT AT ALL
SUM OF ALL RESPONSES TO PHQ QUESTIONS 1-9: 2

## 2020-04-28 NOTE — LETTER
[facility]  [address]  [address]  [place] [address] [phone]        April 28, 2020      Meeta Rowell  [id number] [place] [address] [place] [address]       Dear Meeta,    As your health care provider, I am concerned that your age and/or underlying medical condition puts you at particularly high risk for serious complications should you contract a COVID-19 infection.     Specifically, you have the following conditions/diagnoses, included as high risk conditions per the Centers for Disease Control and Prevention at CDC.gov.     Inflammatory bowel disease on immunosuppressant medication       COVID-19 is a new disease and there is limited information regarding risk factors for severe disease. Based on currently available information and clinical expertise, older adults and people of any age who have serious underlying medical conditions might be at higher risk for severe illness from COVID-19.     It is my medical opinion that you should avoid close contact with others and work from home if possible during this COVID-19 pandemic.     Jesenia Weinberg MD    [facility]

## 2020-04-28 NOTE — PROGRESS NOTES
"Meeta Rowell is a 49 year old female who is being evaluated via a billable video visit.      The patient has been notified of following:     \"This video visit will be conducted via a call between you and your physician/provider. We have found that certain health care needs can be provided without the need for an in-person physical exam.  This service lets us provide the care you need with a video conversation.  If a prescription is necessary we can send it directly to your pharmacy.  If lab work is needed we can place an order for that and you can then stop by our lab to have the test done at a later time.    Video visits are billed at different rates depending on your insurance coverage.  Please reach out to your insurance provider with any questions.    If during the course of the call the physician/provider feels a video visit is not appropriate, you will not be charged for this service.\"    Patient has given verbal consent for Video visit? Yes    How would you like to obtain your AVS? [facility]    Patient would like the video invitation sent by: Text to cell phone: [phone]    Will anyone else be joining your video visit? No        Subjective     Meeta Rowell is a 49 year old female who presents to clinic today for the following health issues:    HPI  Depression and Anxiety Follow-Up    How are you doing with your depression since your last visit? Improved     How are you doing with your anxiety since your last visit?  No change    Are you having other symptoms that might be associated with depression or anxiety? No    Have you had a significant life event? OTHER: its a little hard with COVID due to stay at home order   Do you have any concerns with your use of alcohol or other drugs? No   Feeling good - added vitamin D and feels that makes a difference.  Has been more active and feeling good.  Anxiety - uses alprazolam only for flying.    Has not been working during pandemic, son is home so that has " "been added.  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  Is immunosuppressed on remicade due to IBD.      Social History     Tobacco Use     Smoking status: Never Smoker     Smokeless tobacco: Never Used   Substance Use Topics     Alcohol use: Yes     Alcohol/week: 0.0 standard drinks     Comment: once or twice a month     Drug use: No     PHQ 4/22/2019 12/23/2019 4/28/2020   PHQ-9 Total Score 6 7 2   Q9: Thoughts of better off dead/self-harm past 2 weeks Not at all Not at all Not at all     JANIS-7 SCORE 3/29/2019 7/29/2019 4/28/2020   Total Score - - -   Total Score 4 (minimal anxiety) - -   Total Score 4 2 1         Suicide Assessment Five-step Evaluation and Treatment (SAFE-T)      How many servings of fruits and vegetables do you eat daily?  2-3    On average, how many sweetened beverages do you drink each day (Examples: soda, juice, sweet tea, etc.  Do NOT count diet or artificially sweetened beverages)?   0    How many days per week do you exercise enough to make your heart beat faster? 3 or less    How many minutes a day do you exercise enough to make your heart beat faster? 10 - 19    How many days per week do you miss taking your medication? 0         Video Start Time: 3:15          Reviewed and updated as needed this visit by Provider  Meds         Review of Systems         Objective    There were no vitals taken for this visit.  Estimated body mass index is 35.47 kg/m  as calculated from the following:    Height as of 4/4/19: 1.549 m (5' 1\").    Weight as of 4/4/19: 85.1 kg (187 lb 11.2 oz).  Physical Exam     GENERAL: healthy, alert and no distress  EYES: Eyes grossly normal to inspection, conjunctivae and sclerae normal  RESP: no audible wheeze, cough, or visible cyanosis.  No visible retractions or increased work of breathing.  Able to speak fully in complete sentences.  NEURO: Cranial nerves grossly intact, mentation intact and speech normal  PSYCH: mentation appears " normal, affect normal/bright, judgement and insight intact, normal speech and appearance well-groomed      Diagnostic Test Results:  Labs reviewed in Epic        Assessment & Plan     1. Moderate recurrent major depression (H)  Controlled.  Discussed option of tapering medication but in discussion decided that will stay the same for now given current stressors  - buPROPion (WELLBUTRIN XL) 150 MG 24 hr tablet; TAKE THREE TABLETS BY MOUTH EVERY MORNING  Dispense: 90 tablet; Refill: 3  - busPIRone HCl (BUSPAR) 30 MG tablet; Take 1 tablet (30 mg) by mouth 2 times daily  Dispense: 180 tablet; Refill: 3  - FLUoxetine (PROZAC) 20 MG capsule; Take 3 capsules (60 mg) by mouth daily  Dispense: 270 capsule; Refill: 3    2. Fear of flying  Refilled for as needed use, medication use discussed  - ALPRAZolam (XANAX) 0.25 MG tablet; Take 1-2 tablets (0.25-0.5 mg) by mouth 2 times daily as needed for anxiety (anxiety due to flying)  Dispense: 8 tablet; Refill: 0    3. Generalized anxiety disorder  Refill.  Stress and coping discussed  - propranolol ER (INDERAL LA) 60 MG 24 hr capsule; Take 1 capsule (60 mg) by mouth daily  Dispense: 90 capsule; Refill: 3    4. Crohn's disease without complication, unspecified gastrointestinal tract location (H) and immunosuppressed state  Discussed risk of serious illness if gets coronavirus and precautions discussed, letter for work done  Follow-up with GI for colonoscopy when able         See Patient Instructions    Return in about 9 months (around 1/28/2021) for Physical Exam.    Jes[facility] Weinberg MD  [facility]      Video-Visit Details    Type of service:  Video Visit    Video End Time:3:37 PM    Originating Location (pt. Location): Home    Distant Location (provider location):  [facility]     Mode of Communication:  Video Conference via [employer]    Return in about 9 months (around 1/28/2021) for Physical Exam.       Jesenia Weinberg MD

## 2020-04-29 ASSESSMENT — ANXIETY QUESTIONNAIRES: GAD7 TOTAL SCORE: 1

## 2020-04-30 ENCOUNTER — MYC MEDICAL ADVICE (OUTPATIENT)
Dept: ENDOCRINOLOGY | Facility: CLINIC | Age: 50
End: 2020-04-30

## 2020-04-30 NOTE — TELEPHONE ENCOUNTER
appt cx.    Message sent via [employer]      Shweta Lozada CMA  [facility]  [place]

## 2020-05-06 DIAGNOSIS — C73 THYROID CANCER (H): ICD-10-CM

## 2020-05-06 DIAGNOSIS — E89.0 S/P TOTAL THYROIDECTOMY: ICD-10-CM

## 2020-05-06 PROCEDURE — 99000 SPECIMEN HANDLING OFFICE-LAB: CPT | Performed by: INTERNAL MEDICINE

## 2020-05-06 PROCEDURE — 84432 ASSAY OF THYROGLOBULIN: CPT | Mod: 90 | Performed by: INTERNAL MEDICINE

## 2020-05-06 PROCEDURE — 84439 ASSAY OF FREE THYROXINE: CPT | Mod: 59 | Performed by: INTERNAL MEDICINE

## 2020-05-06 PROCEDURE — 84439 ASSAY OF FREE THYROXINE: CPT | Mod: 90 | Performed by: INTERNAL MEDICINE

## 2020-05-06 PROCEDURE — 84443 ASSAY THYROID STIM HORMONE: CPT | Performed by: INTERNAL MEDICINE

## 2020-05-06 PROCEDURE — 36415 COLL VENOUS BLD VENIPUNCTURE: CPT | Performed by: INTERNAL MEDICINE

## 2020-05-06 PROCEDURE — 00000344 ZZHCL STATISTIC REMEASURE THYROGLOBULIN: Mod: 90 | Performed by: INTERNAL MEDICINE

## 2020-05-06 PROCEDURE — 86800 THYROGLOBULIN ANTIBODY: CPT | Mod: 90 | Performed by: INTERNAL MEDICINE

## 2020-05-07 LAB
T4 FREE SERPL-MCNC: 1.53 NG/DL (ref 0.76–1.46)
TSH SERPL DL<=0.005 MIU/L-ACNC: 0.07 MU/L (ref 0.4–4)

## 2020-05-12 ENCOUNTER — TELEPHONE (OUTPATIENT)
Dept: ENDOCRINOLOGY | Facility: CLINIC | Age: 50
End: 2020-05-12

## 2020-05-12 LAB — T4 FREE SERPL DIALY-MCNC: 2.2 NG/DL (ref 1.1–2.4)

## 2020-05-12 NOTE — TELEPHONE ENCOUNTER
ENDO THYROID LABS-[facility] Latest Ref Rng & Units 5/6/2020 4/4/2019   TSH 0.40 - 4.00 mU/L 0.07 (L) 0.10 (L)   T4 FREE 0.76 - 1.46 ng/dL 1.53 (H) 1.61 (H)     Last visit 4/2019.  2 more labs are pending.    Recommend virtual visit in next 2 weeks ( labs will be back by 2 weeks) to discuss labs and dose adjustment.

## 2020-05-13 LAB — LAB SCANNED RESULT: NORMAL

## 2020-05-19 ENCOUNTER — MYC MEDICAL ADVICE (OUTPATIENT)
Dept: ENDOCRINOLOGY | Facility: CLINIC | Age: 50
End: 2020-05-19

## 2020-05-19 ENCOUNTER — MYC REFILL (OUTPATIENT)
Dept: PEDIATRICS | Facility: CLINIC | Age: 50
End: 2020-05-19

## 2020-05-19 DIAGNOSIS — F51.04 PSYCHOPHYSIOLOGICAL INSOMNIA: ICD-10-CM

## 2020-05-19 DIAGNOSIS — F41.1 GENERALIZED ANXIETY DISORDER: ICD-10-CM

## 2020-05-19 DIAGNOSIS — Z30.41 ENCOUNTER FOR SURVEILLANCE OF CONTRACEPTIVE PILLS: ICD-10-CM

## 2020-05-19 RX ORDER — TRAZODONE HYDROCHLORIDE 100 MG/1
TABLET ORAL
Qty: 360 TABLET | Refills: 1 | Status: SHIPPED | OUTPATIENT
Start: 2020-05-19 | End: 2020-10-29

## 2020-05-19 RX ORDER — TRAZODONE HYDROCHLORIDE 100 MG/1
TABLET ORAL
Qty: 360 TABLET | Refills: 3 | Status: CANCELLED | OUTPATIENT
Start: 2020-05-19

## 2020-05-19 RX ORDER — DROSPIRENONE AND ETHINYL ESTRADIOL 0.03MG-3MG
KIT ORAL
Qty: 84 TABLET | Refills: 1 | Status: SHIPPED | OUTPATIENT
Start: 2020-05-19 | End: 2020-09-10

## 2020-05-19 RX ORDER — PROPRANOLOL HCL 60 MG
60 CAPSULE, EXTENDED RELEASE 24HR ORAL DAILY
Qty: 90 CAPSULE | Refills: 1 | Status: SHIPPED | OUTPATIENT
Start: 2020-05-19 | End: 2021-01-18

## 2020-05-19 RX ORDER — DROSPIRENONE AND ETHINYL ESTRADIOL 0.03MG-3MG
KIT ORAL
Qty: 84 TABLET | Refills: 3 | Status: CANCELLED | OUTPATIENT
Start: 2020-05-19

## 2020-05-19 NOTE — TELEPHONE ENCOUNTER
Routing refill request to provider for review/approval because:  Outdated BP    Alana Uribe, RN on 5/19/2020 at 3:30 PM

## 2020-05-19 NOTE — TELEPHONE ENCOUNTER
Message sent via [employer].      Shweta Lozada CMA  [facility]  [place]

## 2020-05-19 NOTE — TELEPHONE ENCOUNTER
appt guy.    Message sent via [employer].      Shweta Lozada CMA  [facility]  [place]

## 2020-05-21 ENCOUNTER — VIRTUAL VISIT (OUTPATIENT)
Dept: ENDOCRINOLOGY | Facility: CLINIC | Age: 50
End: 2020-05-21
Payer: COMMERCIAL

## 2020-05-21 DIAGNOSIS — E89.0 POSTSURGICAL HYPOTHYROIDISM: ICD-10-CM

## 2020-05-21 DIAGNOSIS — E89.0 S/P TOTAL THYROIDECTOMY: ICD-10-CM

## 2020-05-21 DIAGNOSIS — C73 THYROID CANCER (H): Primary | ICD-10-CM

## 2020-05-21 PROCEDURE — 99214 OFFICE O/P EST MOD 30 MIN: CPT | Mod: 95 | Performed by: INTERNAL MEDICINE

## 2020-05-21 RX ORDER — LEVOTHYROXINE SODIUM 200 UG/1
TABLET ORAL
Qty: 90 TABLET | Refills: 0 | Status: SHIPPED | OUTPATIENT
Start: 2020-05-21 | End: 2020-10-12

## 2020-05-21 NOTE — PATIENT INSTRUCTIONS
[facility] [place] locations   Dr Donis, Endocrinology Department      [facility]   [address]  [place] [address]  Appointment [phone]  Fax: [phone]  [place]: Monday and Tuesday         [facility]   [address]  [place] [address]  Appointment [phone]  Fax: [phone]  [place]: Wednesday and Thursday            Please check the cost coverage and copay with insurance before recommended tests, services and medications (especially if new medications are prescribed).     If ordered, please get blood work done 1 week prior to your next appointment so they will be available to Dr. Donis at your visit.    Decrease dose of levothyroxine to 200 mcg/day X 6 days a week and skip X 1 day a week.  Neck Ultrasound and labs in 2 months.  Please make a lab appointment for blood work and follow up clinic appointment in 1 week after that to discuss results.     [facility] radiology scheduleing   [place]  [phone]   [facility] Radiology scheduling  [place]  [phone]     Please call and schedule the recommended test as discussed in clinic visit. These are the numbers to call.

## 2020-05-21 NOTE — PROGRESS NOTES
"THIS IS A VIDEO VISIT:    Phone call visit/virtual visit encounter:    Name of patient: Meeta Rowell    Date of encounter: 5/21/2020    Time of start of video visit: 9:38    Video started: 9:43    Video ended: 9:50    Time visit video ended: 9:55    Provider location: working from [employer]/ [facility]    Patient location: patients home.    Mode of transmission: video/ Doximity    Verbal consent: obtained before starting visit. Pt is agreeable.      The patient has been notified of following:      \"This VIDEO visit will be conducted via a call between you and your physician/provider. We have found that certain health care needs can be provided without the need for a physical exam.  This service lets us provide the care you need with a short phone conversation.  If a prescription is necessary we can send it directly to your pharmacy.  If lab work is needed we can place an order for that and you can then stop by our lab to have the test done at a later time.     With new updates with corona virus patient might be billed as clinic visit.     If during the course of the call the physician/provider feels a telephone visit is not appropriate, you will not be charged for this service.\"      Past medical history, social history, family history, allergy and medications were reviewed and updated as appropriate.  Reviewed pertinent labs, notes, imaging studies personally.    Name: Meeta Rowell  F/u for postsurgical hypothyroidism and PTC. IRINA  and Nancy Garcia.  Last visit 4/2019  HPI:  Meeta Rowell is a 49 year old female with:    1.  Thyroid cancer:  She initially presented for the evaluation of thyroid nodule, first noted in 2013.  She was previously seen for this by Dr. Berry and recalls FNA biopsy which was \"benign\".  Then  she noted enlarged neck lymph nodes.  Repeat FNA biopsy of the 3.0 cm right thyroid nodule showed follicular lesion of undetermined significance. She underwent total " thyroidectomy 12/11/2015 after Frozen section confirmed a follicular lesion and a small papillary carcinoma of the thyroid .  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 No abnormal activity to suggest  metastatic thyroid cancer.  Follow-up neck ultrasound 7/2016: No enlarged or abnormal-appearing lymph nodes are appreciated in the right or left neck.  TG ( suppressed ) : 1.3 ( 1/27/16)  TG ( stimulated ) : 2.7 ( 2/3/16)  F/u : 8/2018- no mets identified.  TG appears suppressed as noted below.  No f/u US to review. Ordered but not done by pt.    Postsurgical hypothyroidism:  She was started on Levothyroxine 150 mcg after surgery, dose was increased to 175, then increased to  dose 200 mcg qd.  CUrrent  Dose: 200 mcg 6 days a week and 100 mcg 1 day a week no major concerns today.  Feeling fine  Denies  S/p menopause.   Taking generic levothyroxine.    Chest pain, palpitations, tremors.  History of Crohn's disease and has on and off diarrhea.  Trying to loose wt. Current wt 197 lbs per her report at home.  Wt Readings from Last 2 Encounters:   04/04/19 85.1 kg (187 lb 11.2 oz)   01/15/19 91.6 kg (202 lb)     Family history is positive for mother with hypothyroidism.    No family history of thyroid cancer.    No personal history of radiation exposure to the head or neck.    She has a history of Chron's disease and has been treated with Remicade for many years.    She is a nurse.        PMH/PSH:  Past Medical History:   Diagnosis Date     Cancer (H) 12/11/15    thyroid     Crohn's disease (H)      Depression       Endometriosis      GERD (gastroesophageal reflux disease)      Personal history of other genital system and obstetric disorders(V13.29)      Regional enteritis of small intestine (H) 2003    ileal disease     Thyroid disease     hypo     Past Surgical History:   Procedure Laterality Date     C NONSPECIFIC PROCEDURE  2000    s/p cholecystectomy     CHOLECYSTECTOMY       ENT SURGERY  12/2015    total thyroidectomy     THYROIDECTOMY N/A 12/11/2015    Procedure: THYROIDECTOMY;  Surgeon: Shari King MD;  Location: [facility] OR     Family Hx:  Family History   Problem Relation Age of Onset     Hypertension Mother      Cervical Cancer Mother      Gastrointestinal Disease Mother         diverticulitis     Cardiovascular Mother      Hypothyroidism Mother      Coronary Artery Disease Mother      Hypertension Father      Diabetes Paternal Grandmother      Cerebrovascular Disease Paternal Grandmother      Thyroid disease: Yes, mother         DM2: No         Autoimmune: DM1, SLE, RA, Vitiligo:  No    Social Hx:  Social History     Socioeconomic History     Marital status:      Spouse name: Not on file     Number of children: 2     Years of education: Not on file     Highest education level: Not on file   Occupational History     Occupation: nurse     Employer: [employer]     Comment: cardiology nurse   Social Needs     Financial resource strain: Not on file     Food insecurity     Worry: Not on file     Inability: Not on file     Transportation needs     Medical: Not on file     Non-medical: Not on file   Tobacco Use     Smoking status: Never Smoker     Smokeless tobacco: Never Used   Substance and Sexual Activity     Alcohol use: Yes     Alcohol/week: 0.0 standard drinks     Comment: once or twice a month     Drug use: No     Sexual activity: Yes     Partners: Male     Birth control/protection: Surgical, Pill     Comment: Vasectomy   Lifestyle     Physical activity     Days per week: Not on file      Minutes per session: Not on file     Stress: Not on file   Relationships     Social connections     Talks on phone: Not on file     Gets together: Not on file     Attends Mandaeism service: Not on file     Active member of club or organization: Not on file     Attends meetings of clubs or organizations: Not on file     Relationship status: Not on file     Intimate partner violence     Fear of current or ex partner: Not on file     Emotionally abused: Not on file     Physically abused: Not on file     Forced sexual activity: Not on file   Other Topics Concern     Parent/sibling w/ CABG, MI or angioplasty before 65F 55M? Not Asked   Social History Narrative    Older son, Atul,  , with developmental delay          MEDICATIONS:  has a current medication list which includes the following prescription(s): alprazolam, bupropion, buspirone hcl, drospirenone-ethinyl estradiol, fluoxetine, infliximab, lansoprazole, levothyroxine, propranolol er, trazodone, and vitamin d.    Review of Systems  10 point ROS neg other than the symptoms noted above in the HPI.    Physical Exam   VS: LMP  (LMP Unknown)   Breastfeeding No   GENERAL: healthy, alert and no distress  EYES: Eyes grossly normal to inspection, conjunctivae and sclerae normal  RESP: no audible wheeze, cough, or visible cyanosis.  No visible retractions or increased work of breathing.  Able to speak fully in complete sentences.  NEURO: Cranial nerves grossly intact, mentation intact and speech normal  PSYCH: mentation appears normal, affect normal/bright, judgement and insight intact, normal speech and appearance well-groomed    LABS:  TG:  TG ( suppressed ) : 1.3 ( 16), TSH 7.83  TG ( stimulated ) : 2.7 ( 2/3/16), TSH 1.28    2016:  TSH: 1.28  JUAN: <0.4  TG: <0.1    2016:  TSH: 0.62  JUAN: <0.4  TG: <0.1    2020:  TSH: 0.07  JUAN: <0.4  TG: <0.1    TFTs:  ENDO THYROID LABS-[facility] Latest Ref Rng & Units 2020   TSH 0.40 - 4.00 mU/L 0.07 (L) 0.10  (L)   T4 FREE 0.76 - 1.46 ng/dL 1.53 (H) 1.61 (H)     ENDO THYROID LABS-[facility] Latest Ref Rng & Units 1/15/2019 8/8/2018   TSH 0.40 - 4.00 mU/L 0.17 (L) 0.23 (L)   T4 FREE 0.76 - 1.46 ng/dL 1.57 (H) 1.25     ENDO THYROID LABS-[facility] Latest Ref Rng & Units 12/28/2017 6/15/2017   TSH 0.40 - 4.00 mU/L 0.02 (L) 0.27 (L)   T4 FREE 0.76 - 1.46 ng/dL 1.58 (H) 1.34   FREE T3 2.3 - 4.2 pg/mL 2.9      ENDO THYROID LABS-[facility] Latest Ref Rng & Units 12/15/2016   TSH 0.40 - 4.00 mU/L 0.62   T4 FREE 0.76 - 1.46 ng/dL 1.32   FREE T3 2.3 - 4.2 pg/mL 2.2 (L)     ENDO THYROID LABS-[facility] Latest Ref Rng 6/28/2016   TSH 0.40 - 4.00 mU/L 1.28   T4 FREE 0.76 - 1.46 ng/dL 1.13     !THYROID Latest Ref Rng 4/25/2016 1/27/2016 10/14/2015 9/1/2015   TSH 0.40 - 4.00 mU/L 3.74 7.83 (H) 3.79 4.77 (H)   T4 FREE 0.76 - 1.46 ng/dL 1.07 1.10 0.94 0.95     !THYROID Latest Ref Rng 5/22/2015   TSH 0.40 - 4.00 mU/L 5.92 (H)   T4 FREE 0.76 - 1.46 ng/dL 1.52 (H)       FINAL DIAGNOSIS:  A, B, and C.  Specimens: Right thyroid lobe, prelaryngeal tissue, left thyroid  lobe.  Procedure: Total thyroidectomy.  Received: Fresh.  Specimen Integrity: Divided (thyroidectomy performed as  lobectomy and completion thyroidectomy).  Tumor Focality: Four (4) foci of microcarcinomas .  Tumor Laterality: Right lobe (x2) and left lobe (x2).    Tumor Histologic Type (all tumors): Papillary microcarcinoma;  follicular variant, infiltrative.    Tumor Size:  Tumor #1 (right lobe): 0.8 cm.  Tumor #2 (right lobe): 0.6 cm.  Tumor #3 (left lobe): 0.2 cm.  Tumor #4 (left lobe): 0.25 cm.    Margins:  Tumor #1: Involved right anterolateral margin over an area of 0.4  cm.  Tumor #2: Tumor within 0.1 cm of the nearest (right anterolateral)  margin.  Tumor #3: Tumor within fraction of a mm of the left anterolateral  and medial margins.  Tumor #4: Tumor within fraction of a millimeter of the left  anterolateral margin.    Regional Lymph Nodes: No nodes submitted or found.    Pathologic Staging  (pTNM): pT1a(m), pNX, pM not applicable.    Additional Pathologic Findings:  -Hyperplastic nodules (largest 2 cm wide; within right lobe).  -Single normal-appearing parathyroid gland identified within left  lobe, mid region.  -Prelaryngeal tissue (specimen C) comprises of benign fibrovascular  tissue without lymph nodes.  Prior biopsy site changes.      NM I-131 THYROID THERAPY  2/3/2016 9:38 AM     HISTORY: Thyroid cancer.     TECHNIQUE: Patient was given 100 mCi I-131orally and instructed in  home care.         IMPRESSION: Successful I-131 therapy administration.       NUCLEAR MEDICINE I-131 WHOLE BODY SCAN February 10, 2016 8:08 AM       HISTORY: Postprocedural hypothyroidism. Hypocalcemia. Malignant  neoplasm of thyroid gland, follow-up as     COMPARISON: None.     TECHNIQUE: Patient was given I-131 orally prior to the scan followed  by whole body scan.       DOSE: Seven days previously the patient received 106.6 mCi I-131.     FINDINGS: Physiologic activity noted. No abnormal activity to suggest  metastatic thyroid cancer.           IMPRESSION: No metastatic disease demonstrated.    Follow up Neck US 7/2016:  HEAD AND NECK SOFT TISSUE ULTRASOUND  7/18/2016  2:44 PM       HISTORY: Postprocedural hypothyroidism, Malignant neoplasm of thyroid  gland       FINDINGS: Anterior neck ultrasound shows no residual thyroid tissue.  Multiple bilateral normal-appearing lymph nodes are present. No  enlarged or abnormal-appearing lymph nodes are evident in the right or  left neck.                                                                       IMPRESSION: No enlarged or abnormal-appearing lymph nodes are  appreciated in the right or left neck.    WBC I123:  NUCLEAR MEDICINE I-123 WHOLE BODY SCAN  August 16, 2018 12:06 PM      HISTORY: Postsurgical hypothyroidism. Thyroid cancer (H). Status post  total thyroidectomy.     COMPARISON: None.     TECHNIQUE: Patient was given I-123 orally followed by whole body  scan.        DOSE: 2.2mci I123 in 8 capsules on 8/15/18 by LB     FINDINGS: Physiologic activity noted. No abnormal activity to suggest  metastatic thyroid cancer.                                                                       IMPRESSION: No metastatic disease demonstrated.        All pertinent notes, labs and images personally reviewed by me.     A/P  Ms.Heather SERGE Rowell is a 49 year old here for the evaluation of:    1.  PTC:   FNA biopsy of the 3.0 cm right thyroid nodule showed follicular lesion of undetermined significance. 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 No abnormal activity to suggest metastatic thyroid cancer.  Follow up US 6/2016- no mets  F/u I123 scan 8/2018- no mets  -- TG appear stable  Plan:   Discussed diagnosis, pathophysiology, management and treatment options of condition with pt.  Due for US.  Follow up after that.    #2. Postsurgical hypothyroidism:    CUrrent  Dose: 200 mcg 6 days a week and 100 mcg 1 day a week  No major concerns today.  Generic.   Clinically looks euthyroid. Goal TSH <1.0  Wt stable.  Plan:  Decrease dose of levothyroxine to 200 mcg/day X 6 days a week and skip X 1 day a week.  Neck Ultrasound and labs in 2 months.  Please make a lab appointment for blood work and follow up clinic appointment in 1 week after that to discuss results.    Discussed s/s of hypothyroidism and hyperthyroidism to watch for.  The patient  indicates understanding of these issues and agrees with the plan.      Follow-up:  After US and labs in 2 months.    Alice Donis MD  Endocrinology   [facility]    Addendum to above note and clinic visit:    Labs reviewed.    See result note/telephone encounter.

## 2020-05-22 ENCOUNTER — TRANSFERRED RECORDS (OUTPATIENT)
Dept: HEALTH INFORMATION MANAGEMENT | Facility: CLINIC | Age: 50
End: 2020-05-22

## 2020-06-08 ENCOUNTER — TRANSFERRED RECORDS (OUTPATIENT)
Dept: HEALTH INFORMATION MANAGEMENT | Facility: CLINIC | Age: 50
End: 2020-06-08

## 2020-07-17 ENCOUNTER — TRANSFERRED RECORDS (OUTPATIENT)
Dept: HEALTH INFORMATION MANAGEMENT | Facility: CLINIC | Age: 50
End: 2020-07-17

## 2020-08-03 ENCOUNTER — TRANSFERRED RECORDS (OUTPATIENT)
Dept: HEALTH INFORMATION MANAGEMENT | Facility: CLINIC | Age: 50
End: 2020-08-03

## 2020-08-12 ENCOUNTER — TELEPHONE (OUTPATIENT)
Dept: PEDIATRICS | Facility: CLINIC | Age: 50
End: 2020-08-12

## 2020-08-12 NOTE — TELEPHONE ENCOUNTER
Panel Management Review      Patient has the following on her problem list:     Depression / Dysthymia review    Measure:  Needs PHQ-9 score of 4 or less during index window.  Administer PHQ-9 and if score is 5 or more, send encounter to provider for next steps.    5 - 7 month window range:    PHQ-9 SCORE 4/22/2019 12/23/2019 4/28/2020   PHQ-9 Total Score - - -   PHQ-9 Total Score MyChart 6 (Mild depression) 7 (Mild depression) -   PHQ-9 Total Score 6 7 2       If PHQ-9 recheck is 5 or more, route to provider for next steps.    Patient is due for:  PHQ9      Composite cancer screening  Chart review shows that this patient is due/due soon for the following Pap Smear and Mammogram  Summary:    Patient is due/failing the following:   MAMMOGRAM, PAP and PHQ9    Action needed:   Patient needs office visit for MAMMOGRAM, PAP and Patient needs to do PHQ9.    Type of outreach:    Sent [employer] message.    Questions for provider review:    None                                                                                                                                    Lamar Vick MA     Chart routed to Care Team .

## 2020-09-03 DIAGNOSIS — F33.1 MODERATE RECURRENT MAJOR DEPRESSION (H): ICD-10-CM

## 2020-09-03 RX ORDER — BUPROPION HYDROCHLORIDE 150 MG/1
TABLET ORAL
Qty: 90 TABLET | Refills: 1 | Status: SHIPPED | OUTPATIENT
Start: 2020-09-03 | End: 2020-10-27

## 2020-09-03 NOTE — TELEPHONE ENCOUNTER
Prescription approved per [facility] Refill Protocol.    Quang ANDRES RN, BSN

## 2020-09-04 ENCOUNTER — MYC REFILL (OUTPATIENT)
Dept: PEDIATRICS | Facility: CLINIC | Age: 50
End: 2020-09-04

## 2020-09-04 DIAGNOSIS — F40.243 FEAR OF FLYING: ICD-10-CM

## 2020-09-04 RX ORDER — ALPRAZOLAM 0.25 MG
.25-.5 TABLET ORAL 2 TIMES DAILY PRN
Qty: 8 TABLET | Refills: 0 | Status: SHIPPED | OUTPATIENT
Start: 2020-09-04 | End: 2022-04-12

## 2020-09-04 NOTE — TELEPHONE ENCOUNTER
Last filled 4/28/20.  PDMP no concerns.   Has appointment   PDMP Review       Value Time User    State PDMP site checked  Yes 9/4/2020  9:32 AM Jesenia Weinberg MD

## 2020-09-04 NOTE — TELEPHONE ENCOUNTER
Routing refill request to provider for review/approval because:  Drug not on the [facility] refill protocol     Toya Oakley RN   [facility] -- Triage Nurse

## 2020-09-09 DIAGNOSIS — Z30.41 ENCOUNTER FOR SURVEILLANCE OF CONTRACEPTIVE PILLS: ICD-10-CM

## 2020-09-10 RX ORDER — DROSPIRENONE AND ETHINYL ESTRADIOL 0.03MG-3MG
KIT ORAL
Qty: 84 TABLET | Refills: 0 | Status: SHIPPED | OUTPATIENT
Start: 2020-09-10 | End: 2020-12-10

## 2020-09-10 NOTE — TELEPHONE ENCOUNTER
Prescription approved per [facility] Refill Protocol.  Vivian Elizabeth RN

## 2020-09-28 ENCOUNTER — TRANSFERRED RECORDS (OUTPATIENT)
Dept: HEALTH INFORMATION MANAGEMENT | Facility: CLINIC | Age: 50
End: 2020-09-28

## 2020-09-28 LAB
ALT SERPL-CCNC: 19 U/L (ref 0–78)
AST SERPL-CCNC: 17 U/L (ref 0–37)

## 2020-10-09 ENCOUNTER — MYC REFILL (OUTPATIENT)
Dept: ENDOCRINOLOGY | Facility: CLINIC | Age: 50
End: 2020-10-09

## 2020-10-09 DIAGNOSIS — E89.0 S/P TOTAL THYROIDECTOMY: ICD-10-CM

## 2020-10-09 DIAGNOSIS — C73 THYROID CANCER (H): ICD-10-CM

## 2020-10-09 NOTE — TELEPHONE ENCOUNTER
"Requested Prescriptions   Pending Prescriptions Disp Refills     levothyroxine (SYNTHROID/LEVOTHROID) 200 MCG tablet  Last Written Prescription Date:  05/21/20  Last Fill Quantity: 90,  # refills: 0   Last office visit: 4/4/2019 with prescribing provider:  JEFF 05/21/20   Future Office Visit:   Next 5 appointments (look out 90 days)    Dec 01, 2020  2:55 PM  (Arrive by 2:30 PM)  Adult Preventative Visit with SHANE [facility] ([facility] [address] [place] [address]  [address]  [facility] [address]  [phone]                90 tablet 0     Sig: TAKE ONE TABLET (200MCG) BY MOUTH ONCE DAILY SIX DAYS PER WEEK AND skip ONE DAY PER WEEK       Thyroid Protocol Failed - 10/9/2020 11:53 AM        Failed - Normal TSH on file in past 12 months     Recent Labs   Lab Test 05/06/20  1347   TSH 0.07*              Passed - Patient is 12 years or older        Passed - Recent (12 mo) or future (30 days) visit within the authorizing provider's specialty     Patient has had an office visit with the authorizing provider or a provider within the authorizing providers department within the previous 12 mos or has a future within next 30 days. See \"Patient Info\" tab in inbasket, or \"Choose Columns\" in Meds & Orders section of the refill encounter.              Passed - Medication is active on med list        Passed - No active pregnancy on record     If patient is pregnant or has had a positive pregnancy test, please check TSH.          Passed - No positive pregnancy test in past 12 months     If patient is pregnant or has had a positive pregnancy test, please check TSH.               "

## 2020-10-12 RX ORDER — LEVOTHYROXINE SODIUM 200 UG/1
TABLET ORAL
Qty: 90 TABLET | Refills: 0 | Status: SHIPPED | OUTPATIENT
Start: 2020-10-12 | End: 2021-03-25

## 2020-10-13 ENCOUNTER — MYC MEDICAL ADVICE (OUTPATIENT)
Dept: ENDOCRINOLOGY | Facility: CLINIC | Age: 50
End: 2020-10-13

## 2020-10-13 NOTE — TELEPHONE ENCOUNTER
Message sent via [employer].      Shweta Lozada CMA  [facility]  Zainab/Kandice

## 2020-10-13 NOTE — TELEPHONE ENCOUNTER
200 mcg: Take one tablet by mouth once daily six days a week and skip one day per week (Wednesday).    Per my chart message.    Shweta Lozada [facility]  Zainab/Kandice

## 2020-10-14 RX ORDER — LEVOTHYROXINE SODIUM 200 UG/1
TABLET ORAL
Qty: 90 TABLET | Refills: 0 | Status: SHIPPED | OUTPATIENT
Start: 2020-10-14 | End: 2021-01-18

## 2020-10-27 ENCOUNTER — MYC REFILL (OUTPATIENT)
Dept: PEDIATRICS | Facility: CLINIC | Age: 50
End: 2020-10-27

## 2020-10-27 DIAGNOSIS — F33.1 MODERATE RECURRENT MAJOR DEPRESSION (H): ICD-10-CM

## 2020-10-28 RX ORDER — BUPROPION HYDROCHLORIDE 150 MG/1
TABLET ORAL
Qty: 270 TABLET | Refills: 1 | Status: SHIPPED | OUTPATIENT
Start: 2020-10-28 | End: 2021-03-11

## 2020-10-28 NOTE — TELEPHONE ENCOUNTER
Routing refill request to provider for review/approval because:  Labs out of range:  PHQ  Patient needs to be seen because:  Patient due for visit    Quang ANDRES RN, BSN

## 2020-10-29 DIAGNOSIS — F51.04 PSYCHOPHYSIOLOGICAL INSOMNIA: ICD-10-CM

## 2020-10-29 RX ORDER — TRAZODONE HYDROCHLORIDE 100 MG/1
TABLET ORAL
Qty: 360 TABLET | Refills: 1 | Status: SHIPPED | OUTPATIENT
Start: 2020-10-29 | End: 2021-05-11

## 2020-10-29 NOTE — TELEPHONE ENCOUNTER
PHQ 4/28/2020 9/4/2020 10/28/2020   PHQ-9 Total Score 2 5 5   Q9: Thoughts of better off dead/self-harm past 2 weeks Not at all Not at all Not at all     Pt has an apt. On 12/1/2020. Marla Melton LPN

## 2020-10-29 NOTE — TELEPHONE ENCOUNTER
Routing refill request to provider for review/approval because:  Drug interaction warning    Toya Oakley RN   [facility] -- Triage Nurse

## 2020-11-16 ENCOUNTER — E-VISIT (OUTPATIENT)
Dept: FAMILY MEDICINE | Facility: CLINIC | Age: 50
End: 2020-11-16
Payer: COMMERCIAL

## 2020-11-16 DIAGNOSIS — Z20.822 SUSPECTED COVID-19 VIRUS INFECTION: Primary | ICD-10-CM

## 2020-11-16 PROCEDURE — 99421 OL DIG E/M SVC 5-10 MIN: CPT | Performed by: NURSE PRACTITIONER

## 2020-11-16 NOTE — PATIENT INSTRUCTIONS
"  Dear Meeta Rowell,    Your symptoms show that you may have coronavirus (COVID-19). This illness can cause fever, cough and trouble breathing. Many people get a mild case and get better on their own. Some people can get very sick.    Will I be tested for COVID-19?  We would like to test you for this virus. I have placed an order for this test and please call [phone] to schedule testing. [employer] employees please call [phone]. [employer] employees call [phone].     When it's time for your COVID test:  Stay at least 6 feet away from others. (If someone will drive you to your test, stay in the backseat, as far away from the  as you can.)  Cover your mouth and nose with a mask, tissue or washcloth.  Go straight to the testing site. Don't make any stops on the way there or back.    Starting now:     Do not go to work.   o If you receive a negative COVID-19 test result and were NOT exposed to someone with a known positive COVID-19 test, you can return to work once you're free of fever for 24 hours without fever-reducing medication and your symptoms are improving or resolved.  o If you receive a positive COVID-19 test result, you must be cleared by Employee Occupational Health and Safety to return to work.   o If you were exposed to someone who has tested positive for COVID-19, you can return to work 14 days after your last contact with the positive individual, provided you do not have symptoms at all during that time. In some cases, your manager may ask you to come back sooner than 14 days.     During this time, don't leave the house except for testing or medical care.  o Stay in your own room, even for meals. Use your own bathroom if you can.  o Stay away from others in your home. No hugging, kissing or shaking hands. No visitors.  o Don't go to work, school or anywhere else.    Clean \"high touch\" surfaces often (doorknobs, counters, handles, etc.). Use a household cleaning spray or " wipes. You'll find a full list of  on the EPA website: www.epa.gov/pesticide-registration/list-n-disinfectants-use-against-sars-cov-2.    Cover your mouth and nose with a mask, tissue or washcloth to avoid spreading germs.    Wash your hands and face often. Use soap and water.    People in these groups are at risk for severe illness due to COVID-19:  o People 65 years and older  o People who live in a nursing home or long-term care facility  o People with chronic disease (lung, heart, cancer, diabetes, kidney, liver, immunologic)  o People who have a weakened immune system, including those who:  - Are in cancer treatment  - Take medicine that weakens the immune system, such as corticosteroids  - Had a bone marrow or organ transplant  - Have an immune deficiency  - Have poorly controlled HIV or AIDS  - Are obese (body mass index of 40 or higher)  - Smoke regularly      Caregivers should wear gloves while washing dishes, handling laundry and cleaning bedrooms and bathrooms.    Use caution when washing and drying laundry: Don't shake dirty laundry, and use the warmest water setting that you can.    For more tips, go to www.cdc.gov/coronavirus/2019-ncov/downloads/10Things.pdf.    Sign up for [employer]. We know it's scary to hear that you might have COVID-19. We want to track your symptoms to make sure you're okay over the next 2 weeks. Please look for an email from [employer]--this is a free, online program that we'll use to keep in touch. To sign up, follow the link in the email you will receive. Learn more at [email or web address]    How can I take care of myself?    Get lots of rest. Drink extra fluids (unless a doctor has told you not to)    Take Tylenol (acetaminophen) for fever or pain. If you have liver or kidney problems, ask your family doctor if it's okay to take Tylenol.  Adults can take either:    650 mg (two 325 mg pills) every 4 to 6 hours, or     1,000 mg (two 500 mg pills) every  8 hours as needed.    Note: Don't take more than 3,000 mg in one day. Acetaminophen is found in many medicines (both prescribed and over-the-counter medicines). Read all labels to be sure you don't take too much.  For children, check the Tylenol bottle for the right dose. The dose is based on the child's age or weight.    If you have other health problems (like cancer, heart failure, an organ transplant or severe kidney disease): Call your specialty clinic if you don't feel better in the next 2 days.    Know when to call 911. Emergency warning signs include:  Trouble breathing or shortness of breath  Pain or pressure in the chest that doesn't go away  Feeling confused like you haven't felt before, or not being able to wake up  Bluish-colored lips or face    Where can I get more information?     [facility] - About COVID-19: [email or web address]  CDC - What to Do If You're Sick: www.cdc.gov/coronavirus/2019-ncov/about/steps-when-sick.html  November 16, 2020    RE:  Meeta Rowell                                                                                                                                                       [id number] [place] [address]        To whom it may concern:    I evaluated Meeta Rowell on 11/16/20. Meeta Rowell should be excused from work/school.     Do not go to work.      If you receive a negative COVID-19 test result and were NOT exposed to someone with a known positive COVID-19 test, you can return to work once you're free of fever for 24 hours without fever-reducing medication and your symptoms are improving or resolved.    If you receive a positive COVID-19 test result, you must be cleared by Employee Occupational Health and Safety to return to work.     If you were exposed to someone who has tested positive for COVID-19, you can return to work 14 days after your last contact with the positive individual, provided you do not have symptoms at  all during that time. In some cases, your manager may ask you to come back sooner than 14 days.       Sincerely,  Marva Mesa NP

## 2020-11-17 ENCOUNTER — OFFICE VISIT (OUTPATIENT)
Dept: URGENT CARE | Facility: URGENT CARE | Age: 50
End: 2020-11-17
Attending: NURSE PRACTITIONER
Payer: COMMERCIAL

## 2020-11-17 DIAGNOSIS — Z20.822 SUSPECTED COVID-19 VIRUS INFECTION: ICD-10-CM

## 2020-11-17 PROCEDURE — U0003 INFECTIOUS AGENT DETECTION BY NUCLEIC ACID (DNA OR RNA); SEVERE ACUTE RESPIRATORY SYNDROME CORONAVIRUS 2 (SARS-COV-2) (CORONAVIRUS DISEASE [COVID-19]), AMPLIFIED PROBE TECHNIQUE, MAKING USE OF HIGH THROUGHPUT TECHNOLOGIES AS DESCRIBED BY CMS-2020-01-R: HCPCS | Performed by: NURSE PRACTITIONER

## 2020-11-18 LAB
SARS-COV-2 RNA SPEC QL NAA+PROBE: NORMAL
SPECIMEN SOURCE: NORMAL

## 2020-11-19 LAB
LABORATORY COMMENT REPORT: NORMAL
SARS-COV-2 RNA SPEC QL NAA+PROBE: NEGATIVE
SPECIMEN SOURCE: NORMAL

## 2020-11-20 ENCOUNTER — TRANSFERRED RECORDS (OUTPATIENT)
Dept: HEALTH INFORMATION MANAGEMENT | Facility: CLINIC | Age: 50
End: 2020-11-20

## 2020-12-09 DIAGNOSIS — Z30.41 ENCOUNTER FOR SURVEILLANCE OF CONTRACEPTIVE PILLS: ICD-10-CM

## 2020-12-10 RX ORDER — DROSPIRENONE AND ETHINYL ESTRADIOL 0.03MG-3MG
KIT ORAL
Qty: 84 TABLET | Refills: 0 | Status: SHIPPED | OUTPATIENT
Start: 2020-12-10 | End: 2021-02-09

## 2020-12-10 NOTE — TELEPHONE ENCOUNTER
Routing refill request to provider for review/approval because:  Patient needs to be seen because it has been more than 1 year since last office visit.- Last PE 1/15/19.  Cancelled 12/1/20.    Caroline Rodriguez RN

## 2021-01-14 DIAGNOSIS — E89.0 S/P TOTAL THYROIDECTOMY: ICD-10-CM

## 2021-01-14 DIAGNOSIS — C73 THYROID CANCER (H): ICD-10-CM

## 2021-01-15 ENCOUNTER — HEALTH MAINTENANCE LETTER (OUTPATIENT)
Age: 51
End: 2021-01-15

## 2021-01-15 NOTE — TELEPHONE ENCOUNTER
Pending Prescriptions:                       Disp   Refills    levothyroxine (SYNTHROID/LEVOTHROID) 200 M*90 tab*0        Sig: TAKE ONE TABLET (200MCG) BY MOUTH ONCE DAILY SIX DAYS           PER WEEK AND skip ONE DAY PER WEEK    Routing refill request to provider for review/approval because:  Patient fails protocol    TSH   Date Value Ref [facility] Status   05/06/2020 0.07 (L) 0.40 - 4.00 mU/L Final

## 2021-01-16 DIAGNOSIS — F41.1 GENERALIZED ANXIETY DISORDER: ICD-10-CM

## 2021-01-18 RX ORDER — PROPRANOLOL HCL 60 MG
CAPSULE, EXTENDED RELEASE 24HR ORAL
Qty: 90 CAPSULE | Refills: 0 | Status: SHIPPED | OUTPATIENT
Start: 2021-01-18 | End: 2021-02-23

## 2021-01-18 RX ORDER — LEVOTHYROXINE SODIUM 200 UG/1
TABLET ORAL
Qty: 90 TABLET | Refills: 0 | Status: SHIPPED | OUTPATIENT
Start: 2021-01-18 | End: 2023-07-13

## 2021-01-18 NOTE — TELEPHONE ENCOUNTER
Routing refill request to provider for review/approval because:  Failing bp    Caroline Rodriguez, RN

## 2021-01-23 ENCOUNTER — HEALTH MAINTENANCE LETTER (OUTPATIENT)
Age: 51
End: 2021-01-23

## 2021-02-10 ENCOUNTER — TRANSFERRED RECORDS (OUTPATIENT)
Dept: HEALTH INFORMATION MANAGEMENT | Facility: CLINIC | Age: 51
End: 2021-02-10

## 2021-02-23 ENCOUNTER — OFFICE VISIT (OUTPATIENT)
Dept: PEDIATRICS | Facility: CLINIC | Age: 51
End: 2021-02-23
Payer: COMMERCIAL

## 2021-02-23 VITALS
HEART RATE: 65 BPM | HEIGHT: 61 IN | DIASTOLIC BLOOD PRESSURE: 74 MMHG | RESPIRATION RATE: 16 BRPM | SYSTOLIC BLOOD PRESSURE: 126 MMHG | WEIGHT: 204.5 LBS | OXYGEN SATURATION: 97 % | TEMPERATURE: 98 F | BODY MASS INDEX: 38.61 KG/M2

## 2021-02-23 DIAGNOSIS — N76.0 ACUTE VAGINITIS: ICD-10-CM

## 2021-02-23 DIAGNOSIS — E66.01 MORBID OBESITY (H): ICD-10-CM

## 2021-02-23 DIAGNOSIS — E89.0 POSTSURGICAL HYPOTHYROIDISM: ICD-10-CM

## 2021-02-23 DIAGNOSIS — Z30.41 ENCOUNTER FOR SURVEILLANCE OF CONTRACEPTIVE PILLS: ICD-10-CM

## 2021-02-23 DIAGNOSIS — K50.90 CROHN'S DISEASE WITHOUT COMPLICATION, UNSPECIFIED GASTROINTESTINAL TRACT LOCATION (H): ICD-10-CM

## 2021-02-23 DIAGNOSIS — Z11.59 NEED FOR HEPATITIS C SCREENING TEST: ICD-10-CM

## 2021-02-23 DIAGNOSIS — Z12.4 SCREENING FOR MALIGNANT NEOPLASM OF CERVIX: ICD-10-CM

## 2021-02-23 DIAGNOSIS — F33.1 MODERATE RECURRENT MAJOR DEPRESSION (H): ICD-10-CM

## 2021-02-23 DIAGNOSIS — Z12.31 VISIT FOR SCREENING MAMMOGRAM: ICD-10-CM

## 2021-02-23 DIAGNOSIS — R73.01 IMPAIRED FASTING GLUCOSE: ICD-10-CM

## 2021-02-23 DIAGNOSIS — Z00.00 ROUTINE GENERAL MEDICAL EXAMINATION AT A HEALTH CARE FACILITY: Primary | ICD-10-CM

## 2021-02-23 DIAGNOSIS — F41.1 GENERALIZED ANXIETY DISORDER: ICD-10-CM

## 2021-02-23 DIAGNOSIS — C73 THYROID CANCER (H): ICD-10-CM

## 2021-02-23 DIAGNOSIS — E89.0 S/P TOTAL THYROIDECTOMY: ICD-10-CM

## 2021-02-23 LAB
ERYTHROCYTE [DISTWIDTH] IN BLOOD BY AUTOMATED COUNT: 16.2 % (ref 10–15)
HBA1C MFR BLD: 5.7 % (ref 0–5.6)
HCT VFR BLD AUTO: 37.1 % (ref 35–47)
HGB BLD-MCNC: 11.4 G/DL (ref 11.7–15.7)
MCH RBC QN AUTO: 25.3 PG (ref 26.5–33)
MCHC RBC AUTO-ENTMCNC: 30.7 G/DL (ref 31.5–36.5)
MCV RBC AUTO: 82 FL (ref 78–100)
PLATELET # BLD AUTO: 341 10E9/L (ref 150–450)
RBC # BLD AUTO: 4.5 10E12/L (ref 3.8–5.2)
SPECIMEN SOURCE: NORMAL
WBC # BLD AUTO: 8.1 10E9/L (ref 4–11)
WET PREP SPEC: NORMAL

## 2021-02-23 PROCEDURE — 84439 ASSAY OF FREE THYROXINE: CPT | Performed by: INTERNAL MEDICINE

## 2021-02-23 PROCEDURE — 84443 ASSAY THYROID STIM HORMONE: CPT | Performed by: INTERNAL MEDICINE

## 2021-02-23 PROCEDURE — 99213 OFFICE O/P EST LOW 20 MIN: CPT | Mod: 25 | Performed by: INTERNAL MEDICINE

## 2021-02-23 PROCEDURE — 85027 COMPLETE CBC AUTOMATED: CPT | Performed by: INTERNAL MEDICINE

## 2021-02-23 PROCEDURE — 83036 HEMOGLOBIN GLYCOSYLATED A1C: CPT | Performed by: INTERNAL MEDICINE

## 2021-02-23 PROCEDURE — 99396 PREV VISIT EST AGE 40-64: CPT | Performed by: INTERNAL MEDICINE

## 2021-02-23 PROCEDURE — G0145 SCR C/V CYTO,THINLAYER,RESCR: HCPCS | Performed by: INTERNAL MEDICINE

## 2021-02-23 PROCEDURE — 96127 BRIEF EMOTIONAL/BEHAV ASSMT: CPT | Performed by: INTERNAL MEDICINE

## 2021-02-23 PROCEDURE — 87624 HPV HI-RISK TYP POOLED RSLT: CPT | Performed by: INTERNAL MEDICINE

## 2021-02-23 PROCEDURE — 86803 HEPATITIS C AB TEST: CPT | Performed by: INTERNAL MEDICINE

## 2021-02-23 PROCEDURE — 87210 SMEAR WET MOUNT SALINE/INK: CPT | Performed by: INTERNAL MEDICINE

## 2021-02-23 PROCEDURE — 36415 COLL VENOUS BLD VENIPUNCTURE: CPT | Performed by: INTERNAL MEDICINE

## 2021-02-23 RX ORDER — BUSPIRONE HYDROCHLORIDE 30 MG/1
30 TABLET ORAL 2 TIMES DAILY
Qty: 180 TABLET | Refills: 3 | Status: SHIPPED | OUTPATIENT
Start: 2021-02-23 | End: 2022-03-10

## 2021-02-23 RX ORDER — FLUOXETINE 40 MG/1
80 CAPSULE ORAL DAILY
Qty: 180 CAPSULE | Refills: 0 | Status: SHIPPED | OUTPATIENT
Start: 2021-02-23 | End: 2021-05-19

## 2021-02-23 RX ORDER — PROPRANOLOL HCL 60 MG
CAPSULE, EXTENDED RELEASE 24HR ORAL
Qty: 90 CAPSULE | Refills: 3 | Status: SHIPPED | OUTPATIENT
Start: 2021-02-23 | End: 2021-12-20

## 2021-02-23 RX ORDER — DROSPIRENONE AND ETHINYL ESTRADIOL 0.03MG-3MG
1 KIT ORAL DAILY
Qty: 112 TABLET | Refills: 3 | Status: SHIPPED | OUTPATIENT
Start: 2021-02-23 | End: 2022-02-10

## 2021-02-23 SDOH — HEALTH STABILITY: MENTAL HEALTH: HOW OFTEN DO YOU HAVE A DRINK CONTAINING ALCOHOL?: MONTHLY OR LESS

## 2021-02-23 SDOH — SOCIAL STABILITY: SOCIAL NETWORK: HOW OFTEN DO YOU GET TOGETHER WITH FRIENDS OR RELATIVES?: NEVER

## 2021-02-23 SDOH — HEALTH STABILITY: PHYSICAL HEALTH: ON AVERAGE, HOW MANY MINUTES DO YOU ENGAGE IN EXERCISE AT THIS LEVEL?: 0 MIN

## 2021-02-23 SDOH — ECONOMIC STABILITY: FOOD INSECURITY: WITHIN THE PAST 12 MONTHS, THE FOOD YOU BOUGHT JUST DIDN'T LAST AND YOU DIDN'T HAVE MONEY TO GET MORE.: NEVER TRUE

## 2021-02-23 SDOH — HEALTH STABILITY: MENTAL HEALTH: HOW MANY STANDARD DRINKS CONTAINING ALCOHOL DO YOU HAVE ON A TYPICAL DAY?: 1 OR 2

## 2021-02-23 SDOH — SOCIAL STABILITY: SOCIAL NETWORK: HOW OFTEN DO YOU ATTENT MEETINGS OF THE CLUB OR ORGANIZATION YOU BELONG TO?: NEVER

## 2021-02-23 SDOH — ECONOMIC STABILITY: TRANSPORTATION INSECURITY
IN THE PAST 12 MONTHS, HAS LACK OF TRANSPORTATION KEPT YOU FROM MEETINGS, WORK, OR FROM GETTING THINGS NEEDED FOR DAILY LIVING?: NO

## 2021-02-23 SDOH — HEALTH STABILITY: MENTAL HEALTH
STRESS IS WHEN SOMEONE FEELS TENSE, NERVOUS, ANXIOUS, OR CAN'T SLEEP AT NIGHT BECAUSE THEIR MIND IS TROUBLED. HOW STRESSED ARE YOU?: ONLY A LITTLE

## 2021-02-23 SDOH — ECONOMIC STABILITY: INCOME INSECURITY: HOW HARD IS IT FOR YOU TO PAY FOR THE VERY BASICS LIKE FOOD, HOUSING, MEDICAL CARE, AND HEATING?: SOMEWHAT HARD

## 2021-02-23 SDOH — SOCIAL STABILITY: SOCIAL NETWORK: HOW OFTEN DO YOU ATTEND CHURCH OR RELIGIOUS SERVICES?: NEVER

## 2021-02-23 SDOH — ECONOMIC STABILITY: FOOD INSECURITY: WITHIN THE PAST 12 MONTHS, YOU WORRIED THAT YOUR FOOD WOULD RUN OUT BEFORE YOU GOT MONEY TO BUY MORE.: NEVER TRUE

## 2021-02-23 SDOH — HEALTH STABILITY: MENTAL HEALTH: HOW OFTEN DO YOU HAVE 6 OR MORE DRINKS ON ONE OCCASION?: NOT ASKED

## 2021-02-23 SDOH — HEALTH STABILITY: PHYSICAL HEALTH: ON AVERAGE, HOW MANY DAYS PER WEEK DO YOU ENGAGE IN MODERATE TO STRENUOUS EXERCISE (LIKE A BRISK WALK)?: 0 DAYS

## 2021-02-23 SDOH — SOCIAL STABILITY: SOCIAL NETWORK: IN A TYPICAL WEEK, HOW MANY TIMES DO YOU TALK ON THE PHONE WITH FAMILY, FRIENDS, OR NEIGHBORS?: ONCE A WEEK

## 2021-02-23 SDOH — ECONOMIC STABILITY: TRANSPORTATION INSECURITY
IN THE PAST 12 MONTHS, HAS THE LACK OF TRANSPORTATION KEPT YOU FROM MEDICAL APPOINTMENTS OR FROM GETTING MEDICATIONS?: NO

## 2021-02-23 SDOH — SOCIAL STABILITY: SOCIAL NETWORK
DO YOU BELONG TO ANY CLUBS OR ORGANIZATIONS SUCH AS CHURCH GROUPS UNIONS, FRATERNAL OR ATHLETIC GROUPS, OR SCHOOL GROUPS?: NO

## 2021-02-23 SDOH — ECONOMIC STABILITY: INCOME INSECURITY: IN THE LAST 12 MONTHS, WAS THERE A TIME WHEN YOU WERE NOT ABLE TO PAY THE MORTGAGE OR RENT ON TIME?: NO

## 2021-02-23 SDOH — SOCIAL STABILITY: SOCIAL NETWORK: ARE YOU MARRIED, WIDOWED, DIVORCED, SEPARATED, NEVER MARRIED, OR LIVING WITH A PARTNER?: MARRIED

## 2021-02-23 ASSESSMENT — ENCOUNTER SYMPTOMS
ARTHRALGIAS: 0
DIZZINESS: 0
HEMATURIA: 0
NERVOUS/ANXIOUS: 1
WEAKNESS: 0
COUGH: 0
HEMATOCHEZIA: 0
SORE THROAT: 0
HEARTBURN: 0
HEADACHES: 1
PALPITATIONS: 0
CONSTIPATION: 0
ABDOMINAL PAIN: 1
DIARRHEA: 0
CHILLS: 0
DYSURIA: 0
BREAST MASS: 0
PARESTHESIAS: 0
JOINT SWELLING: 0
EYE PAIN: 0
FEVER: 0
FREQUENCY: 0
SHORTNESS OF BREATH: 0
NAUSEA: 0

## 2021-02-23 ASSESSMENT — MIFFLIN-ST. JEOR: SCORE: 1472.05

## 2021-02-23 NOTE — PROGRESS NOTES
"   SUBJECTIVE:   CC: Meeta Rowell is an 50 year old woman who presents for preventive health visit.       Patient has been advised of split billing requirements and indicates understanding: Yes  Healthy Habits:     Getting at least 3 servings of Calcium per day:  NO    Bi-annual eye exam:  Yes    Dental care twice a year:  NO    Sleep apnea or symptoms of sleep apnea:  None    Diet:  Regular (no restrictions)    Frequency of exercise:  None    Duration of exercise:  N/A    Taking medications regularly:  Yes    Barriers to taking medications:  None    Medication side effects:  Muscle aches    PHQ-2 Total Score: 2    Additional concerns today:  No      Today's PHQ-2 Score:   PHQ-2 ( 1999 Pfizer) 2/23/2021   Q1: Little interest or pleasure in doing things 1   Q2: Feeling down, depressed or hopeless 1   PHQ-2 Score 2   Q1: Little interest or pleasure in doing things Several days   Q2: Feeling down, depressed or hopeless Several days   PHQ-2 Score 2     PHQ-9 SCORE 4/28/2020 9/4/2020 10/28/2020   PHQ-9 Total Score - - -   PHQ-9 Total Score MyChart - 5 (Mild depression) 5 (Mild depression)   PHQ-9 Total Score 2 5 5      JANIS-7 SCORE 4/28/2020 9/4/2020 10/28/2020   Total Score - - -   Total Score - 2 (minimal anxiety) 4 (minimal anxiety)   Total Score 1 2 4     Has been more \"crabby\" recently.  Is not interested in counseling    Abuse: Current or Past (Physical, Sexual or Emotional) - No  Do you feel safe in your environment? Yes    Have you ever done Advance Care Planning? (For example, a Health Directive, POLST, or a discussion with a medical provider or your loved ones about your wishes): No, advance care planning information given to patient to review.  Patient plans to discuss their wishes with loved ones or provider.      Social History     Tobacco Use     Smoking status: Never Smoker     Smokeless tobacco: Never Used   Substance Use Topics     Alcohol use: Yes     Frequency: Monthly or less     Drinks per " session: 1 or 2     If you drink alcohol do you typically have >3 drinks per day or >7 drinks per week? No    Alcohol Use 2/23/2021   Prescreen: >3 drinks/day or >7 drinks/week? No   Prescreen: >3 drinks/day or >7 drinks/week? -       Any new diagnosis of family breast, ovarian, or bowel cancer? No     Reviewed orders with patient.  Reviewed health maintenance and updated orders accordingly - Yes  Lab work is in process    Breast CA Risk Screening:  No flowsheet data found. no family history cancer  Mammogram Screening: Recommended annual mammography  Pertinent mammograms are reviewed under the imaging tab.    History of abnormal Pap smear: NO - age 30-65 PAP every 5 years with negative HPV co-testing recommended  PAP / HPV Latest Ref Rng & Units 12/8/2015   PAP - NIL   HPV 16 DNA NEG Negative   HPV 18 DNA NEG Negative   OTHER HR HPV NEG Negative     Reviewed and updated as needed this visit by clinical staff  Tobacco  Allergies  Meds  Problems  Med Hx  Surg Hx  Fam Hx  Soc Hx          Reviewed and updated as needed this visit by Provider  Tobacco  Allergies  Meds  Problems  Med Hx  Surg Hx  Fam Hx             Review of Systems   Constitutional: Negative for chills and fever.   HENT: Positive for congestion. Negative for ear pain, hearing loss and sore throat.    Eyes: Negative for pain and visual disturbance.   Respiratory: Negative for cough and shortness of breath.    Cardiovascular: Negative for chest pain, palpitations and peripheral edema.   Gastrointestinal: Positive for abdominal pain. Negative for constipation, diarrhea, heartburn, hematochezia and nausea.   Breasts:  Negative for breast mass and discharge.   Genitourinary: Positive for vaginal bleeding and vaginal discharge. Negative for dysuria, frequency, genital sores, hematuria, pelvic pain and urgency.   Musculoskeletal: Negative for arthralgias and joint swelling.   Skin: Negative for rash.   Neurological: Positive for headaches.  "Negative for dizziness, weakness and paresthesias.   Psychiatric/Behavioral: Positive for mood changes. The patient is nervous/anxious.    Forgot to schedule remicade so got dose late.  That caused abdomen pain.  Colonoscopy last summer was good.  Chronic nasal congestion, doesn't bother her enough to do nasal spray  Vaginal odor - fishy  Is not having periods but having break through bleeding for 3-4 days out of the month, not heavy.  Not sure when it started.  Headaches  - not unusual       OBJECTIVE:   /74 (BP Location: Right arm, Patient Position: Chair, Cuff Size: Adult Regular)   Pulse 65   Temp 98  F (36.7  C) (Tympanic)   Resp 16   Ht 1.537 m (5' 0.5\")   Wt 92.8 kg (204 lb 8 oz)   SpO2 97%   BMI 39.28 kg/m    Physical Exam  GENERAL: healthy, alert and no distress  EYES: Eyes grossly normal to inspection, PERRL and conjunctivae and sclerae normal  HENT: ear canals and TM's normal, nose and mouth without ulcers or lesions  NECK: no adenopathy, no asymmetry, masses, or scars and thyroid normal to palpation  RESP: lungs clear to auscultation - no rales, rhonchi or wheezes  CV: regular rate and rhythm, normal S1 S2, no S3 or S4, no murmur, click or rub, no peripheral edema and peripheral pulses strong  ABDOMEN: soft, nontender, no hepatosplenomegaly, no masses and bowel sounds normal   (female): normal female external genitalia, normal urethral meatus , vaginal mucosa pink, moist, well rugated and normal cervix,  without masses.  Slight white discharge  MS: no gross musculoskeletal defects noted, no edema  SKIN: no suspicious lesions or rashes  NEURO: Normal strength and tone, mentation intact and speech normal  PSYCH: mentation appears normal, affect normal/bright    Diagnostic Test Results:  Labs reviewed in Epic    ASSESSMENT/PLAN:   1. Routine general medical examination at a health care facility  Routine health education discussed: calcium, diet, exercise, weight, safety.     2. Need for " hepatitis C screening test    - Hepatitis C Screen Reflex to HCV RNA Quant and Genotype    3. Screening for malignant neoplasm of cervix    - Pap imaged thin layer screen with HPV - recommended age 30 - 65 years (select HPV order below)    4. Moderate recurrent major depression (H)  Increase dose per orders and recheck on mychart in 4-6 weeks.  Consider counseling, declines at this time  - FLUoxetine (PROZAC) 40 MG capsule; Take 2 capsules (80 mg) by mouth daily  Dispense: 180 capsule; Refill: 0  - busPIRone HCl (BUSPAR) 30 MG tablet; Take 1 tablet (30 mg) by mouth 2 times daily  Dispense: 180 tablet; Refill: 3    5. Generalized anxiety disorder  refill  - propranolol ER (INDERAL LA) 60 MG 24 hr capsule; TAKE 1 CAPSULE BY MOUTH EVERY DAY  Dispense: 90 capsule; Refill: 3    6. Encounter for surveillance of contraceptive pills  Stop for a week to have period then resume.  If still having spotting, notify for a pelvis ultrasound  - drospirenone-ethinyl estradiol (KANA) 3-0.03 MG tablet; Take 1 tablet by mouth daily Take active pill daily  Dispense: 112 tablet; Refill: 3    7. Thyroid cancer (H)  Recheck labs  - TSH  - T4 free    8. S/P total thyroidectomy    - TSH  - T4 free    9. Postsurgical hypothyroidism    - TSH  - T4 free    10. Acute vaginitis    - Wet prep    11. Visit for screening mammogram    - MA SCREENING DIGITAL BILAT - Future  (s+30); Future    12. Impaired fasting glucose    - HEMOGLOBIN A1C    13. Crohn's disease without complication, unspecified gastrointestinal tract location (H)  Follow-up with gi and continue remicade  - CBC with platelets    14. Morbid obesity (H)  Discussed, refer to weight loss clinic  - COMPREHENSIVE WEIGHT MANAGEMENT    Patient has been advised of split billing requirements and indicates understanding: No  COUNSELING:  Reviewed preventive health counseling, as reflected in patient instructions    Estimated body mass index is 39.28 kg/m  as calculated from the following:     "Height as of this encounter: 1.537 m (5' 0.5\").    Weight as of this encounter: 92.8 kg (204 lb 8 oz).    Weight management plan: Patient referred to endocrine and/or weight management specialty    She reports that she has never smoked. She has never used smokeless tobacco.      Counseling Resources:  ATP IV Guidelines  Pooled Cohorts Equation Calculator  Breast Cancer Risk Calculator  BRCA-Related Cancer Risk Assessment: FHS-7 Tool  FRAX Risk Assessment  ICSI Preventive Guidelines  Dietary Guidelines for Americans, 2010  USDA's MyPlate  ASA Prophylaxis  Lung CA Screening    Jesenia Weinberg MD  [facility]  "

## 2021-02-23 NOTE — PATIENT INSTRUCTIONS
Preventive Health Recommendations  Female Ages 50 - 64    Yearly exam: See your health care provider every year in order to  o Review health changes.   o Discuss preventive care.    o Review your medicines if your doctor has prescribed any.      Get a Pap test every three years (unless you have an abnormal result and your provider advises testing more often).    If you get Pap tests with HPV test, you only need to test every 5 years, unless you have an abnormal result.       Have a mammogram every 1 to 2 years.    Have a colonoscopy at age 50, or have a yearly FIT test (stool test). These exams screen for colon cancer.      Have a cholesterol test every 5 years, or more often if advised.    Have a diabetes test (fasting glucose) every year.       Shots: Get a flu shot each year. Get a tetanus shot every 10 years.    Nutrition:     Eat at least 5 servings of fruits and vegetables each day.    Eat whole-grain bread, whole-wheat pasta and brown rice instead of white grains and rice.    Get adequate Calcium and Vitamin D - .     Lifestyle    Exercise at least 150 minutes a week (30 minutes a day, 5 days a week). This will help you control your weight and prevent disease.    Limit alcohol to one drink per day.    No smoking.     Wear sunscreen to prevent skin cancer.     See your dentist every six months for an exam and cleaning.    See your eye doctor every 1 to 2 years.    Stop taking the birth control pill for one week to give yourself a period.  Then start back up on continuous pills.  If you get spotting again, let me know on mychart and I will order a pelvis ultrasound.    They will call you to schedule at the weight loss clinic.

## 2021-02-24 LAB
HCV AB SERPL QL IA: NONREACTIVE
T4 FREE SERPL-MCNC: 1.4 NG/DL (ref 0.76–1.46)
TSH SERPL DL<=0.005 MIU/L-ACNC: 0.89 MU/L (ref 0.4–4)

## 2021-02-26 ENCOUNTER — TRANSFERRED RECORDS (OUTPATIENT)
Dept: HEALTH INFORMATION MANAGEMENT | Facility: CLINIC | Age: 51
End: 2021-02-26

## 2021-02-26 LAB
COPATH REPORT: NORMAL
PAP: NORMAL

## 2021-03-01 LAB
FINAL DIAGNOSIS: NORMAL
HPV HR 12 DNA CVX QL NAA+PROBE: NEGATIVE
HPV16 DNA SPEC QL NAA+PROBE: NEGATIVE
HPV18 DNA SPEC QL NAA+PROBE: NEGATIVE
SPECIMEN DESCRIPTION: NORMAL
SPECIMEN SOURCE CVX/VAG CYTO: NORMAL

## 2021-03-02 PROBLEM — Z12.4 SCREENING FOR CERVICAL CANCER: Status: ACTIVE | Noted: 2021-03-02

## 2021-03-09 DIAGNOSIS — F33.1 MODERATE RECURRENT MAJOR DEPRESSION (H): ICD-10-CM

## 2021-03-11 ENCOUNTER — TELEPHONE (OUTPATIENT)
Dept: PEDIATRICS | Facility: CLINIC | Age: 51
End: 2021-03-11

## 2021-03-11 RX ORDER — BUPROPION HYDROCHLORIDE 150 MG/1
TABLET ORAL
Qty: 90 TABLET | Refills: 1 | Status: SHIPPED | OUTPATIENT
Start: 2021-03-11 | End: 2021-06-04

## 2021-03-11 NOTE — TELEPHONE ENCOUNTER
Routing refill request to provider for review/approval because:  See 2/23/21 visit, PHQ 9 failed, not up to date  Jasmin Ignacio RN, BSN  Message handled by CLINIC NURSE.

## 2021-03-12 NOTE — TELEPHONE ENCOUNTER
Please call patient and see how much fluoxetine patient is taking per day. Her med list shows that she is taking 80mg per day. Pharmacy sent refill request for fluoxetine 20mg.     Alana Uribe RN on 3/12/2021 at 10:30 AM

## 2021-03-23 NOTE — TELEPHONE ENCOUNTER
40 mg caps were sent in on 2/23/21 for 90 day supply. Closing encounter.     Toya Oakley RN   [facility] -- Triage Nurse

## 2021-03-23 NOTE — TELEPHONE ENCOUNTER
The pt states that she is on 80mg's and that Dr. Weinberg increased it to that.   Barbara Lozada on 3/23/2021 at 11:41 AM

## 2021-04-08 ENCOUNTER — TRANSFERRED RECORDS (OUTPATIENT)
Dept: HEALTH INFORMATION MANAGEMENT | Facility: CLINIC | Age: 51
End: 2021-04-08

## 2021-04-08 LAB
ALT SERPL-CCNC: 16 U/L (ref 0–78)
AST SERPL-CCNC: 13 U/L (ref 0–37)

## 2021-04-10 DIAGNOSIS — F33.1 MODERATE RECURRENT MAJOR DEPRESSION (H): ICD-10-CM

## 2021-04-12 RX ORDER — BUPROPION HYDROCHLORIDE 150 MG/1
TABLET ORAL
Qty: 90 TABLET | Refills: 1 | OUTPATIENT
Start: 2021-04-12

## 2021-04-27 ENCOUNTER — MYC MEDICAL ADVICE (OUTPATIENT)
Dept: PEDIATRICS | Facility: CLINIC | Age: 51
End: 2021-04-27

## 2021-05-10 DIAGNOSIS — F51.04 PSYCHOPHYSIOLOGICAL INSOMNIA: ICD-10-CM

## 2021-05-11 RX ORDER — TRAZODONE HYDROCHLORIDE 100 MG/1
TABLET ORAL
Qty: 360 TABLET | Refills: 2 | Status: SHIPPED | OUTPATIENT
Start: 2021-05-11 | End: 2021-12-31

## 2021-05-11 NOTE — TELEPHONE ENCOUNTER
Routing refill request to provider for review/approval because:  Drug interaction warning    Alana Uribe RN on 5/11/2021 at 11:15 AM

## 2021-05-19 ENCOUNTER — MYC MEDICAL ADVICE (OUTPATIENT)
Dept: PEDIATRICS | Facility: CLINIC | Age: 51
End: 2021-05-19

## 2021-05-22 DIAGNOSIS — F33.1 MODERATE RECURRENT MAJOR DEPRESSION (H): ICD-10-CM

## 2021-05-24 RX ORDER — BUPROPION HYDROCHLORIDE 150 MG/1
TABLET ORAL
Qty: 90 TABLET | Refills: 1 | OUTPATIENT
Start: 2021-05-24

## 2021-05-28 ENCOUNTER — TRANSFERRED RECORDS (OUTPATIENT)
Dept: HEALTH INFORMATION MANAGEMENT | Facility: CLINIC | Age: 51
End: 2021-05-28

## 2021-06-02 DIAGNOSIS — F33.1 MODERATE RECURRENT MAJOR DEPRESSION (H): ICD-10-CM

## 2021-06-03 NOTE — TELEPHONE ENCOUNTER
Sent another my-chart message that has the forms attached. Will then ask Dr. Weinberg to give a larger amount. Marla Melton LPN

## 2021-06-04 RX ORDER — BUPROPION HYDROCHLORIDE 150 MG/1
TABLET ORAL
Qty: 270 TABLET | Refills: 1 | Status: SHIPPED | OUTPATIENT
Start: 2021-06-04 | End: 2021-11-11

## 2021-06-04 NOTE — TELEPHONE ENCOUNTER
Pt would like a larger prescription. Pt takes 3 tabs every morning rx filled on 3/11/21 was only for a total of 2 months. PHQ-9 and JANIS-7 is complete, see chart. Marla Melton LPN

## 2021-06-10 DIAGNOSIS — F33.1 MODERATE RECURRENT MAJOR DEPRESSION (H): ICD-10-CM

## 2021-06-10 RX ORDER — FLUOXETINE 40 MG/1
CAPSULE ORAL
Qty: 180 CAPSULE | Refills: 2 | Status: SHIPPED | OUTPATIENT
Start: 2021-06-10 | End: 2022-04-12

## 2021-06-10 NOTE — TELEPHONE ENCOUNTER
PHQ-9 SCORE 10/28/2020 4/9/2021 6/4/2021   PHQ-9 Total Score - - -   PHQ-9 Total Score MyChart 5 (Mild depression) 5 (Mild depression) 4 (Minimal depression)   PHQ-9 Total Score 5 5 4

## 2021-06-10 NOTE — TELEPHONE ENCOUNTER
PHQ9 completed 6-4-21, routed to Dr. Weinberg to update refills, patient requesting more than one month at a time.

## 2021-07-20 ENCOUNTER — TRANSFERRED RECORDS (OUTPATIENT)
Dept: HEALTH INFORMATION MANAGEMENT | Facility: CLINIC | Age: 51
End: 2021-07-20

## 2021-07-23 ENCOUNTER — TRANSFERRED RECORDS (OUTPATIENT)
Dept: HEALTH INFORMATION MANAGEMENT | Facility: CLINIC | Age: 51
End: 2021-07-23

## 2021-09-04 ENCOUNTER — HEALTH MAINTENANCE LETTER (OUTPATIENT)
Age: 51
End: 2021-09-04

## 2021-09-17 ENCOUNTER — TRANSFERRED RECORDS (OUTPATIENT)
Dept: HEALTH INFORMATION MANAGEMENT | Facility: CLINIC | Age: 51
End: 2021-09-17

## 2021-09-17 LAB
ALT SERPL-CCNC: 9 IU/L (ref 0–32)
AST SERPL-CCNC: 9 IU/L (ref 0–40)

## 2021-11-03 DIAGNOSIS — F33.1 MODERATE RECURRENT MAJOR DEPRESSION (H): ICD-10-CM

## 2021-11-04 RX ORDER — BUPROPION HYDROCHLORIDE 150 MG/1
TABLET ORAL
Qty: 100 TABLET | Refills: 5 | OUTPATIENT
Start: 2021-11-04

## 2021-11-09 DIAGNOSIS — C73 THYROID CANCER (H): ICD-10-CM

## 2021-11-09 DIAGNOSIS — E89.0 S/P TOTAL THYROIDECTOMY: ICD-10-CM

## 2021-11-11 ENCOUNTER — MYC MEDICAL ADVICE (OUTPATIENT)
Dept: PEDIATRICS | Facility: CLINIC | Age: 51
End: 2021-11-11
Payer: COMMERCIAL

## 2021-11-11 ENCOUNTER — MYC MEDICAL ADVICE (OUTPATIENT)
Dept: INTERNAL MEDICINE | Facility: CLINIC | Age: 51
End: 2021-11-11
Payer: COMMERCIAL

## 2021-11-11 DIAGNOSIS — F33.1 MODERATE RECURRENT MAJOR DEPRESSION (H): ICD-10-CM

## 2021-11-11 RX ORDER — LEVOTHYROXINE SODIUM 200 UG/1
TABLET ORAL
Qty: 90 TABLET | Refills: 0 | Status: SHIPPED | OUTPATIENT
Start: 2021-11-11 | End: 2022-06-13

## 2021-11-11 RX ORDER — BUPROPION HYDROCHLORIDE 150 MG/1
450 TABLET ORAL EVERY MORNING
Qty: 270 TABLET | Refills: 0 | Status: SHIPPED | OUTPATIENT
Start: 2021-11-11 | End: 2022-02-01

## 2021-11-11 NOTE — TELEPHONE ENCOUNTER
Medication is being filled for 1 time refill only due to:  Patient needs to be seen because due for follow up w/ PCP.  Routing to MA/TC pool. The Pt is due for a visit with PCP. Please call and help them schedule.    RN will issue a 90 day supply.      Miguel GARCIA RN

## 2021-11-11 NOTE — TELEPHONE ENCOUNTER
Pending Prescriptions:                       Disp   Refills    levothyroxine (SYNTHROID/LEVOTHROID) 200 *90 tab*0            Sig: TAKE ONE TABLET (200MCG) BY MOUTH ONCE DAILY SIX           DAYS PER WEEK AND skip ONE DAY PER WEEK      Medication is being filled for 1 time refill only due to:  Patient needs to be seen because it has been more than one year since last visit.     [facility] message sent to patient.     Iliana SALCEDO RN   [facility] [place]

## 2021-11-24 ENCOUNTER — TRANSFERRED RECORDS (OUTPATIENT)
Dept: HEALTH INFORMATION MANAGEMENT | Facility: CLINIC | Age: 51
End: 2021-11-24
Payer: COMMERCIAL

## 2021-12-10 ENCOUNTER — OFFICE VISIT (OUTPATIENT)
Dept: URGENT CARE | Facility: URGENT CARE | Age: 51
End: 2021-12-10
Payer: COMMERCIAL

## 2021-12-10 VITALS
HEART RATE: 75 BPM | TEMPERATURE: 98.5 F | SYSTOLIC BLOOD PRESSURE: 134 MMHG | DIASTOLIC BLOOD PRESSURE: 84 MMHG | OXYGEN SATURATION: 95 %

## 2021-12-10 DIAGNOSIS — R05.9 COUGH: ICD-10-CM

## 2021-12-10 DIAGNOSIS — Z20.822 SUSPECTED 2019 NOVEL CORONAVIRUS INFECTION: Primary | ICD-10-CM

## 2021-12-10 PROCEDURE — U0005 INFEC AGEN DETEC AMPLI PROBE: HCPCS | Performed by: FAMILY MEDICINE

## 2021-12-10 PROCEDURE — 99213 OFFICE O/P EST LOW 20 MIN: CPT | Performed by: FAMILY MEDICINE

## 2021-12-10 PROCEDURE — U0003 INFECTIOUS AGENT DETECTION BY NUCLEIC ACID (DNA OR RNA); SEVERE ACUTE RESPIRATORY SYNDROME CORONAVIRUS 2 (SARS-COV-2) (CORONAVIRUS DISEASE [COVID-19]), AMPLIFIED PROBE TECHNIQUE, MAKING USE OF HIGH THROUGHPUT TECHNOLOGIES AS DESCRIBED BY CMS-2020-01-R: HCPCS | Performed by: FAMILY MEDICINE

## 2021-12-10 RX ORDER — BENZONATATE 100 MG/1
200 CAPSULE ORAL 3 TIMES DAILY PRN
Qty: 42 CAPSULE | Refills: 3 | Status: SHIPPED | OUTPATIENT
Start: 2021-12-10 | End: 2022-04-12

## 2021-12-10 RX ORDER — CODEINE PHOSPHATE AND GUAIFENESIN 10; 100 MG/5ML; MG/5ML
1-2 SOLUTION ORAL EVERY 6 HOURS PRN
Qty: 120 ML | Refills: 1 | Status: SHIPPED | OUTPATIENT
Start: 2021-12-10 | End: 2022-04-12

## 2021-12-10 NOTE — PROGRESS NOTES
"SUBJECTIVE:   Meeta Rowell is a 51 year old female presenting with a chief complaint of cough (harsh congested coughing sounds.  The cough has been non-productive), nasal congestion, shortness of breath (especially when walking up the stairs or when chewing hard-to-chew foods).    Onset of symptoms was 8 days ago.  Course of illness is still persisting.  .    Severity severe coughing at times.    Current and Associated symptoms: as listed above.   Treatment measures tried include Nyquil a couple times.    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  .    No fevers  There were initially headaches (now resolved)  There has been fatigue  There were body aches (now resolved)  Over the past couple of days her smell has decreased and her taste feels \"off\"  No chest pain  No bluish lips/toes/fingers  No vomiting  There has been some diarrhea over the past few days  No abdominal pain    Patient received her second Pfizer COVID-19 vaccine on January 25, 2021.       Past Medical History:   Diagnosis Date     Cancer (H) 12/11/15    thyroid     Crohn's disease (H)      Depression      Endometriosis      GERD (gastroesophageal reflux disease)      Personal history of other genital system and obstetric disorders(V13.29)      Regional enteritis of small intestine (H) 2003    ileal disease     Thyroid disease     hypo     Current Outpatient Medications   Medication Sig Dispense Refill     ALPRAZolam (XANAX) 0.25 MG tablet Take 1-2 tablets (0.25-0.5 mg) by mouth 2 times daily as needed for anxiety (anxiety due to flying) 8 tablet 0     buPROPion (WELLBUTRIN XL) 150 MG 24 hr tablet Take 3 tablets (450 mg) by mouth every morning 270 tablet 0     busPIRone HCl (BUSPAR) 30 MG tablet Take 1 tablet (30 mg) by mouth 2 times daily 180 tablet 3     drospirenone-ethinyl estradiol (KANA) 3-0.03 MG tablet Take 1 tablet by mouth daily Take active pill daily 112 tablet 3     FLUoxetine (PROZAC) 40 MG capsule " TAKE 2 CAPSULES BY MOUTH EVERY  capsule 2     inFLIXimab (REMICADE) 100 MG injection Inject 3 mg/kg into the vein       LANsoprazole (PREVACID) 15 MG DR capsule TAKE ONE CAPSULE BY MOUTH ONCE DAILY 90 capsule 0     levothyroxine (SYNTHROID/LEVOTHROID) 200 MCG tablet TAKE ONE TABLET (200MCG) BY MOUTH ONCE DAILY SIX DAYS PER WEEK AND skip ONE DAY PER WEEK 90 tablet 0     levothyroxine (SYNTHROID/LEVOTHROID) 200 MCG tablet TAKE ONE TABLET (200MCG) BY MOUTH ONCE DAILY SIX DAYS PER WEEK AND skip ONE DAY PER WEEK 90 tablet 0     propranolol ER (INDERAL LA) 60 MG 24 hr capsule TAKE 1 CAPSULE BY MOUTH EVERY DAY 90 capsule 3     traZODone (DESYREL) 100 MG tablet TAKE ONE TO FOUR TABLETS (100MG TO 400MG) BY MOUTH AT BEDTIME 360 tablet 2     VITAMIN D PO Take by mouth daily       Social History     Tobacco Use     Smoking status: Never Smoker     Smokeless tobacco: Never Used   Substance Use Topics     Alcohol use: Yes       ROS:  CONSTITUTIONAL:NEGATIVE  for fevers.   INTEGUMENTARY/SKIN: Negative for cyanosis.   ENT/MOUTH:  Positive for nasal congestion  RESP: positive for cough.   GI:  Negative for vomiting/diarrhea.     OBJECTIVE:  /84   Pulse 75   Temp 98.5  F (36.9  C) (Tympanic)   SpO2 95%   GENERAL APPEARANCE: healthy, alert and no distress. There is occasional dry cough present. No acute respiratory distress.    HENT:  Oropharynx is within normal limits.  The nose has edematous turbinates in both nostrils.    NECK: supple, nontender, no lymphadenopathy  RESP: lungs clear to auscultation - no rales, rhonchi or wheezes  CV: regular rates and rhythm, normal S1 S2, no murmur noted  SKIN: no cyanosis/pallor.      ASSESSMENT:  Cough    PLAN:  Rx:  Cheratussin AC, Tessalon Perles    Inhale steam or use a humidifier    Go to the emergency room if you develop severe, worsening shortness of breath.      Pending lab:  COVID-19 PCR Test.      Robson Tesfaye MD

## 2021-12-10 NOTE — PATIENT INSTRUCTIONS
"Inhale steam or use a humidifier    Go to the emergency room if you develop severe, worsening shortness of breath.          Patient Education   After Your COVID-19 (Coronavirus) Test  You have been tested for COVID-19 (coronavirus).   If you'll have surgery in the next few days, we'll let you know ahead of time if you have the virus. Please call your surgeon's office with any questions.  For all other patients: Results are usually available in ClipCard within 2 to 3 days.   If you do not have a ClipCard account, you'll get a letter in the mail in about 7 to 10 days.   [employer]romid[employer] is often the fastest way to get test results. Please sign up if you do not already have a ClipCard account. See the handout Getting COVID-19 Test Results in ClipCard for help.  What if my test result is positive?  If your test is positive and you have not viewed your result in ClipCard, you'll get a phone call with your result. (A positive test means that you have the virus.)     Follow the tips under \"How do I self-isolate?\" below for 10 days (20 days if you have a weak immune system).    You don't need to be retested for COVID-19 before going back to school or work. As long as you're fever-free and feeling better, you can go back to school, work and other activities after waiting the 10 or 20 days.  What if I have questions after I get my results?  If you have questions about your results, please visit our testing website at [email or web address].   After 2 days, if you have not gotten your results:     Call [phone] ([phone]) and ask to speak with our COVID-19 results team.    If you're being treated at an infusion center: Call your infusion center directly.  What are the symptoms of COVID-19?  Cough, fever and trouble breathing are the most common signs of COVID-19.  Other symptoms can include new headaches, new muscle or body aches, new and unexplained fatigue (feeling very tired), chills, sore throat, " "congestion (stuffy or runny nose), diarrhea (loose poop), loss of taste or smell, belly pain, and nausea or vomiting (feeling sick to your stomach or throwing up).  You may already have symptoms of COVID-19, or they may show up later.  What should I do if I have symptoms?  If you're having surgery: Call your surgeon's office.  For all other patients: Stay home and away from others (self-isolate) until ...    You've had no fever--and no medicine that reduces fever--for 1 full day (24 hours), AND    Other symptoms have gotten better. For example, your cough or breathing has improved, AND    At least 10 days have passed since your symptoms first started.  How do I self-isolate?    Stay in your own room, even for meals. Use your own bathroom if you can.    Stay away from others in your home. No hugging, kissing or shaking hands. No visitors.    Don't go to work, school or anywhere else.    Clean \"high touch\" surfaces often (doorknobs, counters, handles). Use household cleaning spray or wipes. You'll find a full list of  on the EPA website: www.epa.gov/pesticide-registration/list-n-disinfectants-use-against-sars-cov-2.    Cover your mouth and nose with a mask or other face covering to avoid spreading germs.    Wash your hands and face often. Use soap and water.    Caregivers in these groups are at risk for severe illness due to COVID-19:  ? People 65 years and older  ? People who live in a nursing home or long-term care facility  ? People with chronic disease (lung, heart, cancer, diabetes, kidney, liver, immunologic)  ? People who have a weakened immune system, including those who:    Are in cancer treatment    Take medicine that weakens the immune system, such as corticosteroids    Had a bone marrow or organ transplant    Have an immune deficiency    Have poorly controlled HIV or AIDS    Are obese (body mass index of 40 or higher)    Smoke regularly    Caregivers should wear gloves while washing dishes, " handling laundry and cleaning bedrooms and bathrooms.    Use caution when washing and drying laundry: Don't shake dirty laundry and use the warmest water setting that you can.    For more tips on managing your health at home, go to www.cdc.gov/coronavirus/2019-ncov/downloads/10Things.pdf.  How can I take care of myself at home?  1. Get lots of rest. Drink extra fluids (unless a doctor has told you not to).  2. Take Tylenol (acetaminophen) for fever or pain. If you have liver or kidney problems, ask your family doctor if it's OK to take Tylenol.   Adults can take either:  ? 650 mg (two 325 mg pills) every 4 to 6 hours, or   ? 1,000 mg (two 500 mg pills) every 8 hours as needed.  ? Note: Don't take more than 3,000 mg in one day. Acetaminophen is found in many medicines (both prescribed and over-the-counter medicines). Read all labels to be sure you don't take too much.   For children, check the Tylenol bottle for the right dose. The dose is based on the child's age or weight.  3. If you have other health problems (like cancer, heart failure, an organ transplant or severe kidney disease): Call your specialty clinic if you don't feel better in the next 2 days.  4. Know when to call 911. Emergency warning signs include:  ? Trouble breathing or shortness of breath  ? Chest pain or pressure that doesn't go away  ? Feeling confused like you haven't felt before, or not being able to wake up  ? Bluish-colored lips or face  5. If your doctor prescribed a blood thinner medicine: Follow their instructions.  Where can I get more information?     [facility] [place] - About COVID-19:   [email or web address]    CDC - If You're Sick: cdc.gov/coronavirus/2019-ncov/about/steps-when-sick.html    CDC - Ending Home Isolation: www.cdc.gov/coronavirus/2019-ncov/hcp/disposition-in-home-patients.html    CDC - Caring for Someone: www.cdc.gov/coronavirus/2019-ncov/if-you-are-sick/care-for-someone.html    [facility] - Interim Guidance for  Hospital Discharge to Home: www.health.[facility].[place]/diseases/coronavirus/hcp/hospdischarge.pdf    [facility] clinical trials (COVID-19 research studies): clinicalaffairs.[facility]-clinical-trials    Below are the COVID-19 hotlines at the [place] Department of Health ([facility]). Interpreters are available.  ? For health questions: Call 733-943-8693 or 1-511.390.1092 (7 a.m. to 7 p.m.)  ? For questions about schools and childcare: Call 340-780-0758 or 1-291.325.5651 (7 a.m. to 7 p.m.)    For informational purposes only. Not to replace the advice of your health care provider. Clinically reviewed by Infection Prevention and the [facility] COVID-19 Clinical Team. Copyright   2020 [facility]. All rights reserved. [employer] 354778 - Rev 11/11/20.        Island Pedicle Flap Text: The defect edges were debeveled with a #15 scalpel blade.  Given the location of the defect, shape of the defect and the proximity to free margins an island pedicle advancement flap was deemed most appropriate.  Using a sterile surgical marker, an appropriate advancement flap was drawn incorporating the defect, outlining the appropriate donor tissue and placing the expected incisions within the relaxed skin tension lines where possible.    The area thus outlined was incised deep to adipose tissue with a #15 scalpel blade.  The skin margins were undermined to an appropriate distance in all directions around the primary defect and laterally outward around the island pedicle utilizing iris scissors.  There was minimal undermining beneath the pedicle flap.

## 2021-12-10 NOTE — LETTER
[facility] [place]  [address]  [address]  [place] [address] [phone]      December 10, 2021    RE:  Meeta Rowell                                                                                                                                                       [address] [place] [address]            To whom it may concern:    Meeta Rowell is under my professional care at the [facility] on December 10, 2021.  Because of her recent coughing and shortness of breath, please excuse her absences from work from December 8 to December 10, 2021.   She  may return to work provided that the following criteria have been met:    1) Her COVID-19 test from today is negative.   2) She has no fevers for a 24-hour period.   3) Her shortness of breath and coughing have improved.            Sincerely,        Robson Tesfaye MD    [facility]

## 2021-12-12 LAB — SARS-COV-2 RNA RESP QL NAA+PROBE: POSITIVE

## 2021-12-15 DIAGNOSIS — F41.1 GENERALIZED ANXIETY DISORDER: ICD-10-CM

## 2021-12-15 DIAGNOSIS — F33.1 MAJOR DEPRESSIVE DISORDER, RECURRENT EPISODE, MODERATE (H): Primary | ICD-10-CM

## 2021-12-17 NOTE — TELEPHONE ENCOUNTER
Routing refill request to provider for review/approval because:  Drug not active on patient's medication list    Alana Uribe RN on 12/17/2021 at 10:36 AM

## 2021-12-19 NOTE — TELEPHONE ENCOUNTER
Did not read [employer] message.  Please call her.  Message below:  I got a refill from your pharmacy for the 60mg fluoxetine dose.  I thought we had increased the dose to 80mg.  Which one are you taking?  I just want to make sure that I get you the right dose.     I just saw you have Covid.  I hope you are feeling ok.  Did you apply to get monoclonal antibodies?  They are helpful at preventing severe disease and you can get it up to 10 days after the symptoms started.  There is instructions about it in the letter you got about the result.     Please let us know if you have any questions or concerns.

## 2021-12-20 RX ORDER — PROPRANOLOL HCL 60 MG
CAPSULE, EXTENDED RELEASE 24HR ORAL
Qty: 90 CAPSULE | Refills: 3 | Status: SHIPPED | OUTPATIENT
Start: 2021-12-20 | End: 2022-04-12

## 2021-12-20 RX ORDER — FLUOXETINE 40 MG/1
80 CAPSULE ORAL DAILY
Qty: 180 CAPSULE | Refills: 0 | Status: SHIPPED | OUTPATIENT
Start: 2021-12-20 | End: 2022-02-06

## 2022-01-21 ENCOUNTER — TRANSFERRED RECORDS (OUTPATIENT)
Dept: HEALTH INFORMATION MANAGEMENT | Facility: CLINIC | Age: 52
End: 2022-01-21
Payer: COMMERCIAL

## 2022-01-30 DIAGNOSIS — F33.1 MODERATE RECURRENT MAJOR DEPRESSION (H): ICD-10-CM

## 2022-02-01 RX ORDER — BUPROPION HYDROCHLORIDE 150 MG/1
TABLET ORAL
Qty: 90 TABLET | Refills: 0 | Status: SHIPPED | OUTPATIENT
Start: 2022-02-01 | End: 2022-03-14

## 2022-02-01 NOTE — TELEPHONE ENCOUNTER
Last refill until visit  Prescription approved per [facility] Refill Protocol.  Jasmin Ignacio RN, BSN  [facility]

## 2022-02-06 ENCOUNTER — MYC REFILL (OUTPATIENT)
Dept: PEDIATRICS | Facility: CLINIC | Age: 52
End: 2022-02-06
Payer: COMMERCIAL

## 2022-02-06 DIAGNOSIS — F41.1 GENERALIZED ANXIETY DISORDER: ICD-10-CM

## 2022-02-06 DIAGNOSIS — F33.1 MAJOR DEPRESSIVE DISORDER, RECURRENT EPISODE, MODERATE (H): ICD-10-CM

## 2022-02-09 DIAGNOSIS — F51.04 PSYCHOPHYSIOLOGICAL INSOMNIA: ICD-10-CM

## 2022-02-09 DIAGNOSIS — Z30.41 ENCOUNTER FOR SURVEILLANCE OF CONTRACEPTIVE PILLS: ICD-10-CM

## 2022-02-09 DIAGNOSIS — F33.1 MODERATE RECURRENT MAJOR DEPRESSION (H): ICD-10-CM

## 2022-02-09 RX ORDER — FLUOXETINE 40 MG/1
80 CAPSULE ORAL DAILY
Qty: 180 CAPSULE | Refills: 0 | Status: SHIPPED | OUTPATIENT
Start: 2022-02-09 | End: 2022-04-12

## 2022-02-10 RX ORDER — BUPROPION HYDROCHLORIDE 150 MG/1
TABLET ORAL
Qty: 90 TABLET | Refills: 0 | OUTPATIENT
Start: 2022-02-10

## 2022-02-10 RX ORDER — DROSPIRENONE AND ETHINYL ESTRADIOL 0.03MG-3MG
1 KIT ORAL DAILY
Qty: 84 TABLET | Refills: 0 | Status: SHIPPED | OUTPATIENT
Start: 2022-02-10 | End: 2022-03-14

## 2022-02-10 RX ORDER — TRAZODONE HYDROCHLORIDE 100 MG/1
TABLET ORAL
Qty: 120 TABLET | Refills: 1 | OUTPATIENT
Start: 2022-02-10

## 2022-02-10 NOTE — TELEPHONE ENCOUNTER
Trazodone and Bupropion- too early    Yeny- Prescription approved per [facility] Refill Protocol.    Caroline Rodriguez RN

## 2022-02-13 ENCOUNTER — HEALTH MAINTENANCE LETTER (OUTPATIENT)
Age: 52
End: 2022-02-13

## 2022-02-22 ENCOUNTER — TELEPHONE (OUTPATIENT)
Dept: INTERNAL MEDICINE | Facility: CLINIC | Age: 52
End: 2022-02-22
Payer: COMMERCIAL

## 2022-02-22 DIAGNOSIS — E89.0 S/P TOTAL THYROIDECTOMY: ICD-10-CM

## 2022-02-22 DIAGNOSIS — C73 THYROID CANCER (H): ICD-10-CM

## 2022-02-22 RX ORDER — LEVOTHYROXINE SODIUM 200 UG/1
TABLET ORAL
Qty: 90 TABLET | Refills: 0 | OUTPATIENT
Start: 2022-02-22

## 2022-02-22 NOTE — TELEPHONE ENCOUNTER
Please call patient and schedule follow up.     Pt has not been seen since 5/2020.    Pt also needs labs before refill.

## 2022-03-04 DIAGNOSIS — F41.1 GENERALIZED ANXIETY DISORDER: ICD-10-CM

## 2022-03-04 DIAGNOSIS — F33.1 MAJOR DEPRESSIVE DISORDER, RECURRENT EPISODE, MODERATE (H): ICD-10-CM

## 2022-03-04 RX ORDER — FLUOXETINE 40 MG/1
CAPSULE ORAL
Qty: 60 CAPSULE | Refills: 1 | Status: SHIPPED | OUTPATIENT
Start: 2022-03-04 | End: 2022-04-12

## 2022-03-04 NOTE — TELEPHONE ENCOUNTER
Routing refill request to provider for review/approval because:  Preethi given x1 and patient did not follow up, please advise  Patient needs to be seen because:  due for 6 month follow up  Outdated phq9    Alana Uribe RN on 3/4/2022 at 4:12 PM

## 2022-03-09 DIAGNOSIS — F33.1 MODERATE RECURRENT MAJOR DEPRESSION (H): ICD-10-CM

## 2022-03-10 RX ORDER — BUSPIRONE HYDROCHLORIDE 30 MG/1
30 TABLET ORAL 2 TIMES DAILY
Qty: 60 TABLET | Refills: 1 | Status: SHIPPED | OUTPATIENT
Start: 2022-03-10 | End: 2022-04-12

## 2022-03-10 NOTE — TELEPHONE ENCOUNTER
Refills extended until April visit, pharmacy asking for #60  Prescription approved per [facility] Refill Protocol.  Jasmin Ignacio RN, BSN  [facility]

## 2022-03-11 DIAGNOSIS — Z30.41 ENCOUNTER FOR SURVEILLANCE OF CONTRACEPTIVE PILLS: ICD-10-CM

## 2022-03-11 DIAGNOSIS — F33.1 MODERATE RECURRENT MAJOR DEPRESSION (H): ICD-10-CM

## 2022-03-11 DIAGNOSIS — F51.04 PSYCHOPHYSIOLOGICAL INSOMNIA: ICD-10-CM

## 2022-03-14 RX ORDER — BUPROPION HYDROCHLORIDE 150 MG/1
TABLET ORAL
Qty: 90 TABLET | Refills: 0 | Status: SHIPPED | OUTPATIENT
Start: 2022-03-14 | End: 2022-04-06

## 2022-03-14 RX ORDER — DROSPIRENONE AND ETHINYL ESTRADIOL 0.03MG-3MG
1 KIT ORAL DAILY
Qty: 84 TABLET | Refills: 0 | Status: SHIPPED | OUTPATIENT
Start: 2022-03-14 | End: 2022-04-12

## 2022-03-14 RX ORDER — TRAZODONE HYDROCHLORIDE 100 MG/1
TABLET ORAL
Qty: 120 TABLET | Refills: 0 | Status: SHIPPED | OUTPATIENT
Start: 2022-03-14 | End: 2022-04-12

## 2022-03-14 NOTE — TELEPHONE ENCOUNTER
Routing refill request to provider for review/approval because:  Drug interaction warning  Trinity Alves RN on 3/14/2022 at 3:36 PM

## 2022-03-25 ENCOUNTER — TRANSFERRED RECORDS (OUTPATIENT)
Dept: HEALTH INFORMATION MANAGEMENT | Facility: CLINIC | Age: 52
End: 2022-03-25
Payer: COMMERCIAL

## 2022-03-25 LAB
ALT SERPL-CCNC: 8 IU/L (ref 0–32)
AST SERPL-CCNC: 13 IU/L (ref 0–40)

## 2022-04-06 DIAGNOSIS — F33.1 MODERATE RECURRENT MAJOR DEPRESSION (H): ICD-10-CM

## 2022-04-06 RX ORDER — BUPROPION HYDROCHLORIDE 150 MG/1
TABLET ORAL
Qty: 90 TABLET | Refills: 0 | Status: SHIPPED | OUTPATIENT
Start: 2022-04-06 | End: 2022-04-12

## 2022-04-06 NOTE — TELEPHONE ENCOUNTER
Patient has upcoming appointment in 6 days. Prescription approved per [facility] Refill Protocol.  Miguel GARCIA RN

## 2022-04-10 ENCOUNTER — HEALTH MAINTENANCE LETTER (OUTPATIENT)
Age: 52
End: 2022-04-10

## 2022-04-10 DIAGNOSIS — F51.04 PSYCHOPHYSIOLOGICAL INSOMNIA: ICD-10-CM

## 2022-04-11 NOTE — TELEPHONE ENCOUNTER
Routing refill request to provider for review/approval because:  Drug interaction warning    Miguel GARCIA RN

## 2022-04-12 ENCOUNTER — OFFICE VISIT (OUTPATIENT)
Dept: PEDIATRICS | Facility: CLINIC | Age: 52
End: 2022-04-12
Payer: COMMERCIAL

## 2022-04-12 VITALS
TEMPERATURE: 98.1 F | HEIGHT: 61 IN | WEIGHT: 193.9 LBS | SYSTOLIC BLOOD PRESSURE: 112 MMHG | OXYGEN SATURATION: 97 % | RESPIRATION RATE: 14 BRPM | DIASTOLIC BLOOD PRESSURE: 66 MMHG | BODY MASS INDEX: 36.61 KG/M2 | HEART RATE: 66 BPM

## 2022-04-12 DIAGNOSIS — Z12.31 VISIT FOR SCREENING MAMMOGRAM: ICD-10-CM

## 2022-04-12 DIAGNOSIS — C73 THYROID CANCER (H): ICD-10-CM

## 2022-04-12 DIAGNOSIS — Z00.00 ROUTINE GENERAL MEDICAL EXAMINATION AT A HEALTH CARE FACILITY: Primary | ICD-10-CM

## 2022-04-12 DIAGNOSIS — F41.1 GENERALIZED ANXIETY DISORDER: ICD-10-CM

## 2022-04-12 DIAGNOSIS — E89.0 S/P TOTAL THYROIDECTOMY: ICD-10-CM

## 2022-04-12 DIAGNOSIS — F51.04 PSYCHOPHYSIOLOGICAL INSOMNIA: ICD-10-CM

## 2022-04-12 DIAGNOSIS — K21.9 GASTROESOPHAGEAL REFLUX DISEASE WITHOUT ESOPHAGITIS: ICD-10-CM

## 2022-04-12 DIAGNOSIS — Z30.41 ENCOUNTER FOR SURVEILLANCE OF CONTRACEPTIVE PILLS: ICD-10-CM

## 2022-04-12 DIAGNOSIS — F40.243 FEAR OF FLYING: ICD-10-CM

## 2022-04-12 DIAGNOSIS — R73.01 IMPAIRED FASTING GLUCOSE: ICD-10-CM

## 2022-04-12 DIAGNOSIS — F33.1 MODERATE RECURRENT MAJOR DEPRESSION (H): ICD-10-CM

## 2022-04-12 LAB — HBA1C MFR BLD: 5.6 % (ref 0–5.6)

## 2022-04-12 PROCEDURE — 84443 ASSAY THYROID STIM HORMONE: CPT | Performed by: INTERNAL MEDICINE

## 2022-04-12 PROCEDURE — 84432 ASSAY OF THYROGLOBULIN: CPT | Performed by: INTERNAL MEDICINE

## 2022-04-12 PROCEDURE — 86800 THYROGLOBULIN ANTIBODY: CPT | Performed by: INTERNAL MEDICINE

## 2022-04-12 PROCEDURE — 99396 PREV VISIT EST AGE 40-64: CPT | Performed by: INTERNAL MEDICINE

## 2022-04-12 PROCEDURE — 36415 COLL VENOUS BLD VENIPUNCTURE: CPT | Performed by: INTERNAL MEDICINE

## 2022-04-12 PROCEDURE — 83036 HEMOGLOBIN GLYCOSYLATED A1C: CPT | Performed by: INTERNAL MEDICINE

## 2022-04-12 PROCEDURE — 84439 ASSAY OF FREE THYROXINE: CPT | Performed by: INTERNAL MEDICINE

## 2022-04-12 RX ORDER — FLUOXETINE 40 MG/1
CAPSULE ORAL
Qty: 180 CAPSULE | Refills: 4 | Status: SHIPPED | OUTPATIENT
Start: 2022-04-12 | End: 2023-05-02

## 2022-04-12 RX ORDER — ALPRAZOLAM 0.25 MG
.25-.5 TABLET ORAL 2 TIMES DAILY PRN
Qty: 8 TABLET | Refills: 0 | Status: SHIPPED | OUTPATIENT
Start: 2022-04-12 | End: 2022-09-07

## 2022-04-12 RX ORDER — PROPRANOLOL HCL 60 MG
CAPSULE, EXTENDED RELEASE 24HR ORAL
Qty: 90 CAPSULE | Refills: 3 | Status: SHIPPED | OUTPATIENT
Start: 2022-04-12 | End: 2023-05-24

## 2022-04-12 RX ORDER — BUPROPION HYDROCHLORIDE 150 MG/1
450 TABLET ORAL EVERY MORNING
Qty: 270 TABLET | Refills: 4 | Status: SHIPPED | OUTPATIENT
Start: 2022-04-12 | End: 2023-05-18

## 2022-04-12 RX ORDER — TRAZODONE HYDROCHLORIDE 100 MG/1
TABLET ORAL
Qty: 360 TABLET | Refills: 4 | Status: SHIPPED | OUTPATIENT
Start: 2022-04-12 | End: 2023-05-02

## 2022-04-12 RX ORDER — DROSPIRENONE AND ETHINYL ESTRADIOL 0.03MG-3MG
1 KIT ORAL DAILY
Qty: 84 TABLET | Refills: 4 | Status: SHIPPED | OUTPATIENT
Start: 2022-04-12 | End: 2023-05-15

## 2022-04-12 RX ORDER — LANSOPRAZOLE 30 MG/1
30 CAPSULE, DELAYED RELEASE ORAL DAILY
Qty: 90 CAPSULE | Refills: 4 | Status: SHIPPED | OUTPATIENT
Start: 2022-04-12 | End: 2023-05-24

## 2022-04-12 RX ORDER — BUSPIRONE HYDROCHLORIDE 30 MG/1
30 TABLET ORAL 2 TIMES DAILY
Qty: 180 TABLET | Refills: 3 | Status: SHIPPED | OUTPATIENT
Start: 2022-04-12 | End: 2023-05-16

## 2022-04-12 ASSESSMENT — ENCOUNTER SYMPTOMS
DYSURIA: 0
SORE THROAT: 0
NERVOUS/ANXIOUS: 0
FEVER: 0
COUGH: 0
DIARRHEA: 1
PALPITATIONS: 0
MYALGIAS: 1
NAUSEA: 0
BREAST MASS: 0
DIZZINESS: 0
PARESTHESIAS: 0
EYE PAIN: 0
SHORTNESS OF BREATH: 0
HEADACHES: 0
WEAKNESS: 0
CONSTIPATION: 0
HEARTBURN: 0
JOINT SWELLING: 0
HEMATURIA: 0
FREQUENCY: 0
HEMATOCHEZIA: 0
ABDOMINAL PAIN: 0
ARTHRALGIAS: 1
CHILLS: 0

## 2022-04-12 ASSESSMENT — ANXIETY QUESTIONNAIRES
GAD7 TOTAL SCORE: 3
4. TROUBLE RELAXING: SEVERAL DAYS
GAD7 TOTAL SCORE: 3
5. BEING SO RESTLESS THAT IT IS HARD TO SIT STILL: NOT AT ALL
7. FEELING AFRAID AS IF SOMETHING AWFUL MIGHT HAPPEN: NOT AT ALL
GAD7 TOTAL SCORE: 3
7. FEELING AFRAID AS IF SOMETHING AWFUL MIGHT HAPPEN: NOT AT ALL
2. NOT BEING ABLE TO STOP OR CONTROL WORRYING: NOT AT ALL
3. WORRYING TOO MUCH ABOUT DIFFERENT THINGS: SEVERAL DAYS
6. BECOMING EASILY ANNOYED OR IRRITABLE: SEVERAL DAYS
1. FEELING NERVOUS, ANXIOUS, OR ON EDGE: NOT AT ALL

## 2022-04-12 ASSESSMENT — PATIENT HEALTH QUESTIONNAIRE - PHQ9
10. IF YOU CHECKED OFF ANY PROBLEMS, HOW DIFFICULT HAVE THESE PROBLEMS MADE IT FOR YOU TO DO YOUR WORK, TAKE CARE OF THINGS AT HOME, OR GET ALONG WITH OTHER PEOPLE: SOMEWHAT DIFFICULT
SUM OF ALL RESPONSES TO PHQ QUESTIONS 1-9: 4
SUM OF ALL RESPONSES TO PHQ QUESTIONS 1-9: 4

## 2022-04-12 NOTE — PATIENT INSTRUCTIONS
Get your covid booster  Get your shingles shot      Preventive Health Recommendations  Female Ages 50 - 64    Yearly exam: See your health care provider every year in order to  Review health changes.   Discuss preventive care.    Review your medicines if your doctor has prescribed any.    Get a Pap test every three years (unless you have an abnormal result and your provider advises testing more often).  If you get Pap tests with HPV test, you only need to test every 5 years, unless you have an abnormal result.       Have a mammogram every 1 to 2 years.  Have a colonoscopy at age 50, or have a yearly FIT test (stool test). These exams screen for colon cancer.    Have a cholesterol test every 5 years, or more often if advised.  Have a diabetes test (fasting glucose) every year      Shots: Get a flu shot each year. Get a tetanus shot every 10 years.    Nutrition:   Eat at least 5 servings of fruits and vegetables each day.  Eat whole-grain bread, whole-wheat pasta and brown rice instead of white grains and rice.  Get adequate Calcium and Vitamin D.     Lifestyle  Exercise at least 150 minutes a week (30 minutes a day, 5 days a week). This will help you control your weight and prevent disease.  Limit alcohol to one drink per day.  No smoking.   Wear sunscreen to prevent skin cancer.   See your dentist every six months for an exam and cleaning.  See your eye doctor every 1 to 2 years.

## 2022-04-12 NOTE — PROGRESS NOTES
SUBJECTIVE:   CC: Meeta Rowell is an 51 year old woman who presents for preventive health visit.     Patient has been advised of split billing requirements and indicates understanding: Yes  Healthy Habits:     Getting at least 3 servings of Calcium per day:  Yes    Bi-annual eye exam:  Yes    Dental care twice a year:  NO    Sleep apnea or symptoms of sleep apnea:  None    Diet:  Other    Frequency of exercise:  None    Taking medications regularly:  Yes    Medication side effects:  No significant flushing and Muscle aches    PHQ-2 Total Score: 2    Additional concerns today:  Yes  exercise - does get steps at work.    Concerns today: would like xanax for trip she is taking on  Saturday - fear of flying    Had covid in December    Mood -   PHQ-9 SCORE 4/9/2021 6/4/2021 4/12/2022   PHQ-9 Total Score - - -   PHQ-9 Total Score MyChart 5 (Mild depression) 4 (Minimal depression) 4 (Minimal depression)   PHQ-9 Total Score 5 4 4     JANIS-7 SCORE 4/9/2021 6/4/2021 4/12/2022   Total Score - - -   Total Score 5 (mild anxiety) 2 (minimal anxiety) 3 (minimal anxiety)   Total Score 5 2 3     OCP - taking daily with no concerns.  Occasional spotting but mostly if misses medications one day.      Today's PHQ-2 Score:   PHQ-2 ( 1999 Pfizer) 4/12/2022   Q1: Little interest or pleasure in doing things 1   Q2: Feeling down, depressed or hopeless 1   PHQ-2 Score 2   PHQ-2 Total Score (12-17 Years)- Positive if 3 or more points; Administer PHQ-A if positive -   Q1: Little interest or pleasure in doing things Several days   Q2: Feeling down, depressed or hopeless Several days   PHQ-2 Score 2     Abuse: Current or Past (Physical, Sexual or Emotional) - No  Do you feel safe in your environment? Yes    Social History     Tobacco Use     Smoking status: Never Smoker     Smokeless tobacco: Never Used   Substance Use Topics     Alcohol use: Yes     Comment: couple of times a year     Alcohol Use 4/12/2022   Prescreen: >3 drinks/day or >7  drinks/week? No   Prescreen: >3 drinks/day or >7 drinks/week? -     Reviewed orders with patient.  Reviewed health maintenance and updated orders accordingly - Yes  Labs reviewed in EPIC    Breast Cancer Screening:  Any new diagnosis of family breast, ovarian, or bowel cancer? No    FHS-7: No flowsheet data found.    Mammogram Screening: Recommended annual mammography  Pertinent mammograms are reviewed under the imaging tab.    History of abnormal Pap smear: NO - age 30-65 PAP every 5 years with negative HPV co-testing recommended  PAP / HPV Latest Ref Rng & Units 2/23/2021 12/8/2015   PAP (Historical) - NIL NIL   HPV16 NEG:Negative Negative Negative   HPV18 NEG:Negative Negative Negative   HRHPV NEG:Negative Negative Negative     Reviewed and updated as needed this visit by clinical staff   Tobacco  Allergies  Meds   Med Hx  Surg Hx  Fam Hx  Soc Hx          Reviewed and updated as needed this visit by Provider   Tobacco   Meds     Fam Hx               Review of Systems   Constitutional: Negative for chills and fever.   HENT: Negative for congestion, ear pain, hearing loss and sore throat.    Eyes: Negative for pain and visual disturbance.   Respiratory: Negative for cough and shortness of breath.    Cardiovascular: Negative for chest pain, palpitations and peripheral edema.   Gastrointestinal: Positive for diarrhea. Negative for abdominal pain, constipation, heartburn, hematochezia and nausea.   Breasts:  Negative for tenderness, breast mass and discharge.   Genitourinary: Negative for dysuria, frequency, genital sores, hematuria, pelvic pain, urgency, vaginal bleeding and vaginal discharge.   Musculoskeletal: Positive for arthralgias and myalgias. Negative for joint swelling.   Skin: Negative for rash.   Neurological: Negative for dizziness, weakness, headaches and paresthesias.   Psychiatric/Behavioral: Negative for mood changes. The patient is not nervous/anxious.    diarrhea - due to IBD, sees  "GI  Arthralgias - after remicade       OBJECTIVE:   /66 (BP Location: Right arm, Cuff Size: Adult Regular)   Pulse 66   Temp 98.1  F (36.7  C) (Tympanic)   Resp 14   Ht 1.537 m (5' 0.5\")   Wt 88 kg (193 lb 14.4 oz)   LMP  (LMP Unknown)   SpO2 97%   BMI 37.25 kg/m    Physical Exam  GENERAL: healthy, alert and no distress  EYES: Eyes grossly normal to inspection, PERRL and conjunctivae and sclerae normal  HENT: ear canals and TM's normal, nose and mouth without ulcers or lesions  NECK: no adenopathy, no asymmetry, masses, or scars and thyroid normal to palpation  RESP: lungs clear to auscultation - no rales, rhonchi or wheezes  CV: regular rate and rhythm, normal S1 S2, no S3 or S4, no murmur, click or rub, no peripheral edema and peripheral pulses strong  ABDOMEN: soft, nontender, no hepatosplenomegaly, no masses and bowel sounds normal  MS: no gross musculoskeletal defects noted, no edema  SKIN: no suspicious lesions or rashes  NEURO: Normal strength and tone, mentation intact and speech normal  PSYCH: mentation appears normal, affect normal/bright    Diagnostic Test Results:  Labs reviewed in Epic    ASSESSMENT/PLAN:   Routine general medical examination at a health care facility  Routine health education discussed: calcium, diet, exercise, weight, safety.     Visit for screening mammogram    - MA SCREENING DIGITAL BILAT - Future  (s+30); Future    Moderate recurrent major depression (H)  Controlled.  Declines attempt to taper, refilled  - busPIRone HCl (BUSPAR) 30 MG tablet; Take 1 tablet (30 mg) by mouth 2 times daily  - buPROPion (WELLBUTRIN XL) 150 MG 24 hr tablet; Take 3 tablets (450 mg) by mouth every morning    Fear of flying  Discussed, refill  - ALPRAZolam (XANAX) 0.25 MG tablet; Take 1-2 tablets (0.25-0.5 mg) by mouth 2 times daily as needed for anxiety (anxiety due to flying)    Psychophysiological insomnia  refill  - traZODone (DESYREL) 100 MG tablet; TAKE ONE TO FOUR TABLETS (100MG TO " "400MG) BY MOUTH AT BEDTIME    Generalized anxiety disorder  Controlled, does not want to taper medications.  refilled  - FLUoxetine (PROZAC) 40 MG capsule; TAKE 2 CAPSULES BY MOUTH EVERY DAY  - propranolol ER (INDERAL LA) 60 MG 24 hr capsule; TAKE 1 CAPSULE BY MOUTH EVERY DAY      Encounter for surveillance of contraceptive pills  Discussed.  Refill and consider stopped in 2-3 yrs  - drospirenone-ethinyl estradiol (KANA) 3-0.03 MG tablet; Take 1 tablet by mouth daily Take active pill daily    Gastroesophageal reflux disease without esophagitis  Discussed, prescription sent  - LANsoprazole (PREVACID) 30 MG DR capsule; Take 1 capsule (30 mg) by mouth daily    Impaired fasting glucose  Discussed glucose elevation.  Discuss this is a pre-diabetic condition.  Recommended eating healthily, exercising and maintaining a healthy weight to prevent the development of diabetes.  Recommended blood sugar checks at least yearly to monitor this.  Recommended dietary consultation which the patient declined.   - HEMOGLOBIN A1C; Future  - HEMOGLOBIN A1C    Thyroid cancer (H)  Recheck labs and follow-up with endo  - TSH  - T4 free  - Thyroglobulin and Antibody Reflex    S/P total thyroidectomy    - TSH  - T4 free  - Thyroglobulin and Antibody Reflex            COUNSELING:  Reviewed preventive health counseling, as reflected in patient instructions    Estimated body mass index is 37.25 kg/m  as calculated from the following:    Height as of this encounter: 1.537 m (5' 0.5\").    Weight as of this encounter: 88 kg (193 lb 14.4 oz).    Weight management plan: Discussed healthy diet and exercise guidelines    She reports that she has never smoked. She has never used smokeless tobacco.      Counseling Resources:  ATP IV Guidelines  Pooled Cohorts Equation Calculator  Breast Cancer Risk Calculator  BRCA-Related Cancer Risk Assessment: FHS-7 Tool  FRAX Risk Assessment  ICSI Preventive Guidelines  Dietary Guidelines for Americans, " 2010  [employer]'s MyPlate  ASA Prophylaxis  Lung CA Screening    Jesenia Weinberg MD  [facility]  Answers for HPI/ROS submitted by the patient on 4/12/2022  If you checked off any problems, how difficult have these problems made it for you to do your work, take care of things at home, or get along with other people?: Somewhat difficult  PHQ9 TOTAL SCORE: 4  JANIS 7 TOTAL SCORE: 3

## 2022-04-13 LAB
T4 FREE SERPL-MCNC: 1.49 NG/DL (ref 0.76–1.46)
TSH SERPL DL<=0.005 MIU/L-ACNC: 0.51 MU/L (ref 0.4–4)

## 2022-04-13 RX ORDER — TRAZODONE HYDROCHLORIDE 100 MG/1
TABLET ORAL
Qty: 120 TABLET | Refills: 0 | OUTPATIENT
Start: 2022-04-13

## 2022-04-13 ASSESSMENT — ANXIETY QUESTIONNAIRES: GAD7 TOTAL SCORE: 3

## 2022-04-13 ASSESSMENT — PATIENT HEALTH QUESTIONNAIRE - PHQ9: SUM OF ALL RESPONSES TO PHQ QUESTIONS 1-9: 4

## 2022-04-15 LAB
THYROGLOB AB SERPL IA-ACNC: <20 IU/ML
THYROGLOB SERPL-MCNC: <0.2 NG/ML

## 2022-05-17 ENCOUNTER — LAB REQUISITION (OUTPATIENT)
Dept: LAB | Facility: CLINIC | Age: 52
End: 2022-05-17

## 2022-05-17 LAB — SARS-COV-2 RNA RESP QL NAA+PROBE: NEGATIVE

## 2022-05-17 PROCEDURE — U0005 INFEC AGEN DETEC AMPLI PROBE: HCPCS | Performed by: INTERNAL MEDICINE

## 2022-05-17 NOTE — RESULT ENCOUNTER NOTE
Labs results/imaging studies results noted. Will discuss in next clinic visit 6/13/22.    Here is a copy for your records.  Follow-up in endocrinology Clinic as scheduled/discussed. We will review these studies/results in further detail at that visit, but feel free to contact us with any other questions in the interim.    Please call endocrinology clinic (nurse line: [phone]) if questions.    Alice Donis MD

## 2022-06-10 ENCOUNTER — TRANSFERRED RECORDS (OUTPATIENT)
Dept: HEALTH INFORMATION MANAGEMENT | Facility: CLINIC | Age: 52
End: 2022-06-10
Payer: COMMERCIAL

## 2022-06-13 ENCOUNTER — VIRTUAL VISIT (OUTPATIENT)
Dept: ENDOCRINOLOGY | Facility: CLINIC | Age: 52
End: 2022-06-13
Payer: COMMERCIAL

## 2022-06-13 DIAGNOSIS — E89.0 S/P TOTAL THYROIDECTOMY: ICD-10-CM

## 2022-06-13 DIAGNOSIS — Z85.850 HISTORY OF THYROID CANCER: Primary | ICD-10-CM

## 2022-06-13 DIAGNOSIS — C73 THYROID CANCER (H): ICD-10-CM

## 2022-06-13 DIAGNOSIS — D09.9 PAPILLARY CARCINOMA IN SITU: ICD-10-CM

## 2022-06-13 DIAGNOSIS — E89.0 POSTSURGICAL HYPOTHYROIDISM: ICD-10-CM

## 2022-06-13 PROCEDURE — 99214 OFFICE O/P EST MOD 30 MIN: CPT | Mod: 95 | Performed by: INTERNAL MEDICINE

## 2022-06-13 RX ORDER — LEVOTHYROXINE SODIUM 200 UG/1
TABLET ORAL
Qty: 90 TABLET | Refills: 1 | Status: SHIPPED | OUTPATIENT
Start: 2022-06-13 | End: 2022-12-21

## 2022-06-13 ASSESSMENT — ENCOUNTER SYMPTOMS
BOWEL INCONTINENCE: 0
BLOATING: 1
BLOOD IN STOOL: 0
DIARRHEA: 1
ABDOMINAL PAIN: 1
VOMITING: 0
HEARTBURN: 1
NAUSEA: 1
JAUNDICE: 0
CONSTIPATION: 0
RECTAL PAIN: 0

## 2022-06-13 NOTE — PATIENT INSTRUCTIONS
-[facility]  Dr Donis, Endocrinology Department    [facility]   [address]  [place] [address]  Appointment [phone]  Fax: [phone]  [place]: Monday - Thursday      Continue current dose of levothyroxine- take 200 mcg X 6 days a week and skip X1 day a week.  Labs in 3-4 months to follow trend.  Neck ultrasound needed.  Please make a lab appointment for blood work and follow up clinic appointment in 1 week after that to discuss results.     [facility] radiology scheduleing   [place]  [phone]   [facility] Radiology scheduling  [place]  [phone]     Please call and schedule the recommended test as discussed in clinic visit. These are the numbers to call.    Take Levothyroxine on an empty stomach. Take it with a full glass of water at least 30 minutes to 1 hour before eating breakfast.   This medicine should be taken at least 4 hours before or 4 hours after these medicines: antacids (Maalox , Mylanta , Tums ), calcium supplements, cholestyramine (Prevalite , Questran ), colestipol (Colestid ), iron supplements, orlistat (Arjun , Xenical ), simethicone (Gas-X , Mylicon ), and sucralfate (Carafate ).   Swallow the capsule whole. Do not cut or crush it.

## 2022-06-13 NOTE — PROGRESS NOTES
Meeta is a 51 year old who is being evaluated via a billable video visit.        How would you like to obtain your AVS? MyChart  If the video visit is dropped, the invitation should be resent by: Text message to [phone]   Will anyone else be joining your video visit? No

## 2022-06-13 NOTE — LETTER
"    6/13/2022         RE: Meeta Rowell  [address] [place] [address]        Dear Colleague,    Thank you for referring your patient, Meeta Rowell, to the [facility]. Please see a copy of my visit note below.    Meeta is a 51 year old who is being evaluated via a billable video visit.        How would you like to obtain your AVS? MyChart  If the video visit is dropped, the invitation should be resent by: Text message to [phone]   Will anyone else be joining your video visit? No          THIS IS A VIDEO VISIT:    Phone call visit/virtual visit encounter:    Name of patient: Meeta Rowell    Date of encounter: 6/13/2022    Time of start of video visit: 1:01    Video started: 1:11    Video ended: 1:20    Provider location: working from home/ [facility]    Patient location: patients home.    Mode of transmission: video/ Doximity    Verbal consent: obtained before starting visit. Pt is agreeable.      The patient has been notified of following:      \"This VIDEO visit will be conducted via a call between you and your physician/provider. We have found that certain health care needs can be provided without the need for a physical exam.  This service lets us provide the care you need with a short phone conversation.  If a prescription is necessary we can send it directly to your pharmacy.  If lab work is needed we can place an order for that and you can then stop by our lab to have the test done at a later time.     With new updates with corona virus patient might be billed as clinic visit.     If during the course of the call the physician/provider feels a telephone visit is not appropriate, you will not be charged for this service.\"      Past medical history, social history, family history, allergy and medications were reviewed and updated as appropriate.  Reviewed pertinent labs, notes, imaging studies personally.    Name: Meeta Rowell  F/u for postsurgical " "hypothyroidism and PTC. IRINA  and Nancy Garcia.  Last visit 2020.  HPI:  Meeta Rowell is a 51 year old female for above.   has a past medical history of Cancer (H) (12/11/15), Crohn's disease (H), Depression, Endometriosis, GERD (gastroesophageal reflux disease), Personal history of other genital system and obstetric disorders(V13.29), Regional enteritis of small intestine (H) (2003), and Thyroid disease.      1. Thyroid cancer:  She initially presented for the evaluation of thyroid nodule, first noted in 2013.  She was previously seen for this by Dr. Berry and recalls FNA biopsy which was \"benign\".  Then  she noted enlarged neck lymph nodes.  Repeat FNA biopsy of the 3.0 cm right thyroid nodule showed follicular lesion of undetermined significance. She underwent total thyroidectomy 12/11/2015 after Frozen section confirmed a follicular lesion and a small papillary carcinoma of the thyroid .  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 No abnormal activity to suggest  metastatic thyroid cancer.  Follow-up neck ultrasound 7/2016: No enlarged or abnormal-appearing lymph nodes are appreciated in the right or left neck.  TG ( suppressed ) : 1.3 ( 1/27/16)  TG ( stimulated ) : 2.7 ( 2/3/16)  F/u : 8/2018- no mets identified.  TG appears suppressed as noted below.      2. Postsurgical hypothyroidism:  She was started on Levothyroxine 150 mcg after surgery, dose was increased to 175, then increased to  dose 200 mcg qd.  CUrrent  Dose: 200 mcg 6 days a week and skip X1 day a week.  On this dose X " 2020.  No major concerns today.  Takes generic.  reports compliance.    Feeling fine  Energy is OK.  S/p menopause.     Chest pain, palpitations, tremors.  History of Crohn's disease and has on and off diarrhea.  Weight is mostly stable.  Family history is positive for mother with hypothyroidism.    No family history of thyroid cancer.    No personal history of radiation exposure to the head or neck.    She has a history of Chron's disease and has been treated with Remicade for many years.    She is a nurse.      Patient feels well at this time and denies any tachycardia, palpitations, heat intolerance, tremor, insomnia, diarrhea, or unexplained weight loss.  Patient also denies  cold intolerance, constipation, or unexplained weight gain.     Wt Readings from Last 2 Encounters:   04/12/22 88 kg (193 lb 14.4 oz)   02/23/21 92.8 kg (204 lb 8 oz)       PMH/PSH:  Past Medical History:   Diagnosis Date     Cancer (H) 12/11/15    thyroid     Crohn's disease (H)      Depression      Endometriosis      GERD (gastroesophageal reflux disease)      Personal history of other genital system and obstetric disorders(V13.29)      Regional enteritis of small intestine (H) 2003    ileal disease     Thyroid disease     hypo     Past Surgical History:   Procedure Laterality Date     CHOLECYSTECTOMY       ENT SURGERY  12/2015    total thyroidectomy     THYROIDECTOMY N/A 12/11/2015    Procedure: THYROIDECTOMY;  Surgeon: Shari King MD;  Location:  OR     [facility] NONSPECIFIC PROCEDURE  2000    s/p cholecystectomy     Family Hx:  Family History   Problem Relation Age of Onset     Hypertension Mother      Cervical Cancer Mother      Gastrointestinal Disease Mother         diverticulitis     Cardiovascular Mother      Hypothyroidism Mother      Coronary Artery Disease Mother      Hypertension Father      Diabetes Paternal Grandmother      Cerebrovascular Disease Paternal Grandmother      Thyroid disease: Yes, mother         DM2:  No         Autoimmune: DM1, SLE, RA, Vitiligo:  No    Social Hx:  Social History     Socioeconomic History     Marital status:      Spouse name: Not on file     Number of children: 2     Years of education: Not on file     Highest education level: Associate degree: occupational, technical, or vocational program   Occupational History     Occupation: nurse     Employer: [employer]     Comment: cardiology nurse   Tobacco Use     Smoking status: Never Smoker     Smokeless tobacco: Never Used   Vaping Use     Vaping Use: Never used   Substance and Sexual Activity     Alcohol use: Yes     Comment: couple of times a year     Drug use: No     Sexual activity: Yes     Partners: Male     Birth control/protection: Surgical, Pill     Comment: Vasectomy   Other Topics Concern     Parent/sibling w/ CABG, MI or angioplasty before 65F 55M? Not Asked   Social History Narrative    Older son, Atul,  , with developmental delay     Social Determinants of Health     Financial Resource Strain: Medium Risk     Difficulty of Paying Living Expenses: Somewhat hard   Food Insecurity: No Food Insecurity     Worried About Running Out of Food in the Last Year: Never true     Ran Out of Food in the Last Year: Never true   Transportation Needs: No Transportation Needs     Lack of Transportation (Medical): No     Lack of Transportation (Non-Medical): No   Physical Activity: Not on file   Stress: Not on file   Social Connections: Not on file   Intimate Partner Violence: Not on file   Housing Stability: Not on file          MEDICATIONS:  has a current medication list which includes the following prescription(s): alprazolam, bupropion, buspirone hcl, drospirenone-ethinyl estradiol, fluoxetine, infliximab, lansoprazole, levothyroxine, levothyroxine, propranolol er, trazodone, and vitamin d.    Review of Systems  10 point ROS neg other than the symptoms noted above in the HPI.    Physical Exam   VS: There were no vitals  taken for this visit.  GENERAL: healthy, alert and no distress  EYES: Eyes grossly normal to inspection, conjunctivae and sclerae normal  ENT: no nose swelling, nasal discharge.  Thyroid: s/p thyroidectomy  RESP: no audible wheeze, cough, or visible cyanosis.  No visible retractions or increased work of breathing.  Able to speak fully in complete sentences.  ABDO: not evaluated.  EXTREMITIES: no hand tremors.  NEURO: Cranial nerves grossly intact, mentation intact and speech normal  SKIN: No apparent skin lesions, rash or edema seen   PSYCH: mentation appears normal, affect normal/bright, judgement and insight intact, normal speech and appearance well-groomed      LABS:  TG:  TG ( suppressed ) : 1.3 ( 1/27/16), TSH 7.83  TG ( stimulated ) : 2.7 ( 2/3/16), TSH 1.28    6/2016:  TSH: 1.28  JUAN: <0.4  TG: <0.1    12/2016:  TSH: 0.62  JUAN: <0.4  TG: <0.1    5/2020:  TSH: 0.07  JUAN: <0.4  TG: <0.1    4/2022:    TFTs:  ENDO THYROID LABS-[facility] Latest Ref Rng & Units 4/12/2022 2/23/2021   TSH 0.40 - 4.00 mU/L 0.51 0.89   FREE T3 2.3 - 4.2 pg/mL     FREE T4 0.76 - 1.46 ng/dL 1.49 (H) 1.40   THYROGLOBULIN SERGEY <40 IU/mL <20        FINAL DIAGNOSIS:  A, B, and C.  Specimens: Right thyroid lobe, prelaryngeal tissue, left thyroid  lobe.  Procedure: Total thyroidectomy.  Received: Fresh.  Specimen Integrity: Divided (thyroidectomy performed as  lobectomy and completion thyroidectomy).  Tumor Focality: Four (4) foci of microcarcinomas .  Tumor Laterality: Right lobe (x2) and left lobe (x2).    Tumor Histologic Type (all tumors): Papillary microcarcinoma;  follicular variant, infiltrative.    Tumor Size:  Tumor #1 (right lobe): 0.8 cm.  Tumor #2 (right lobe): 0.6 cm.  Tumor #3 (left lobe): 0.2 cm.  Tumor #4 (left lobe): 0.25 cm.    Margins:  Tumor #1: Involved right anterolateral margin over an area of 0.4  cm.  Tumor #2: Tumor within 0.1 cm of the nearest (right anterolateral)  margin.  Tumor #3: Tumor within fraction of a mm of the  left anterolateral  and medial margins.  Tumor #4: Tumor within fraction of a millimeter of the left  anterolateral margin.    Regional Lymph Nodes: No nodes submitted or found.    Pathologic Staging (pTNM): pT1a(m), pNX, pM not applicable.    Additional Pathologic Findings:  -Hyperplastic nodules (largest 2 cm wide; within right lobe).  -Single normal-appearing parathyroid gland identified within left  lobe, mid region.  -Prelaryngeal tissue (specimen C) comprises of benign fibrovascular  tissue without lymph nodes.  Prior biopsy site changes.      NM I-131 THYROID THERAPY  2/3/2016 9:38 AM     HISTORY: Thyroid cancer.     TECHNIQUE: Patient was given 100 mCi I-131orally and instructed in  home care.         IMPRESSION: Successful I-131 therapy administration.       NUCLEAR MEDICINE I-131 WHOLE BODY SCAN February 10, 2016 8:08 AM       HISTORY: Postprocedural hypothyroidism. Hypocalcemia. Malignant  neoplasm of thyroid gland, follow-up as     COMPARISON: None.     TECHNIQUE: Patient was given I-131 orally prior to the scan followed  by whole body scan.       DOSE: Seven days previously the patient received 106.6 mCi I-131.     FINDINGS: Physiologic activity noted. No abnormal activity to suggest  metastatic thyroid cancer.           IMPRESSION: No metastatic disease demonstrated.    Follow up Neck US 7/2016:  HEAD AND NECK SOFT TISSUE ULTRASOUND  7/18/2016  2:44 PM       HISTORY: Postprocedural hypothyroidism, Malignant neoplasm of thyroid  gland       FINDINGS: Anterior neck ultrasound shows no residual thyroid tissue.  Multiple bilateral normal-appearing lymph nodes are present. No  enlarged or abnormal-appearing lymph nodes are evident in the right or  left neck.                                                                       IMPRESSION: No enlarged or abnormal-appearing lymph nodes are  appreciated in the right or left neck.    WBC I123:  NUCLEAR MEDICINE I-123 WHOLE BODY SCAN  August 16, 2018 12:06 PM       HISTORY: Postsurgical hypothyroidism. Thyroid cancer (H). Status post  total thyroidectomy.     COMPARISON: None.     TECHNIQUE: Patient was given I-123 orally followed by whole body scan.        DOSE: 2.2mci I123 in 8 capsules on 8/15/18 by LB     FINDINGS: Physiologic activity noted. No abnormal activity to suggest  metastatic thyroid cancer.                                                                       IMPRESSION: No metastatic disease demonstrated.        All pertinent notes, labs and images personally reviewed by me.     A/P  Ms.Heather SERGE Rowell is a 49 year old here for the evaluation of:    #1.  PTC:   FNA biopsy of the 3.0 cm right thyroid nodule showed follicular lesion of undetermined significance. 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 No abnormal activity to suggest metastatic thyroid cancer.  Follow up US 6/2016- no mets  F/u I123 scan 8/2018- no mets  -- TG appear stable  Plan:   Discussed diagnosis, pathophysiology, management and treatment options of condition with pt.  Due for Ultrasound.  Follow up after that.    #2. Postsurgical hypothyroidism:    CUrrent  Dose: 200 mcg X 6 days a week and skip X  1 day a week.  On this dose X 2020.  No major concerns today.  Generic.   Clinically looks euthyroid. Goal TSH <1.0  Wt stable.  Plan:  Discussed diagnosis, pathophysiology, management and treatment options of condition with pt.  Based on  4/2022 labs- Continue current dose of levothyroxine- take 200 mcg X 6 days a week and skip X1 day a week.  Labs in 3-4 months to follow trend.( noted slightly high FT4 but no major clinical concerns)  Please make a lab appointment for blood work and follow up clinic appointment in 1 week after that to discuss results.    Discussed s/s of hypothyroidism and hyperthyroidism to watch for.  The patient indicates understanding of these issues and agrees with the plan.      Follow-up:  3-4 months    Alice Donis MD  Endocrinology   [facility]    Addendum to above note and clinic visit:    Labs reviewed.    See result note/telephone encounter.                  Again, thank you for allowing me to participate in the care of your patient.        Sincerely,        Alice Donis MD

## 2022-06-13 NOTE — PROGRESS NOTES
"THIS IS A VIDEO VISIT:    Phone call visit/virtual visit encounter:    Name of patient: Meeta Rowell    Date of encounter: 6/13/2022    Time of start of video visit: 1:01    Video started: 1:11    Video ended: 1:20    Provider location: working from home/ [facility]    Patient location: patients home.    Mode of transmission: video/ Doximity    Verbal consent: obtained before starting visit. Pt is agreeable.      The patient has been notified of following:      \"This VIDEO visit will be conducted via a call between you and your physician/provider. We have found that certain health care needs can be provided without the need for a physical exam.  This service lets us provide the care you need with a short phone conversation.  If a prescription is necessary we can send it directly to your pharmacy.  If lab work is needed we can place an order for that and you can then stop by our lab to have the test done at a later time.     With new updates with corona virus patient might be billed as clinic visit.     If during the course of the call the physician/provider feels a telephone visit is not appropriate, you will not be charged for this service.\"      Past medical history, social history, family history, allergy and medications were reviewed and updated as appropriate.  Reviewed pertinent labs, notes, imaging studies personally.    Name: Meeta Rowell  F/u for postsurgical hypothyroidism and PTC. IRINA  and Nancy Garcia.  Last visit 2020.  HPI:  Meeta Rowell is a 51 year old female for above.   has a past medical history of Cancer (H) (12/11/15), Crohn's disease (H), Depression, Endometriosis, GERD (gastroesophageal reflux disease), Personal history of other genital system and obstetric disorders(V13.29), Regional enteritis of small intestine (H) (2003), and Thyroid disease.      1. Thyroid cancer:  She initially presented for the evaluation of thyroid nodule, first noted in 2013.  She was " "previously seen for this by Dr. Berry and recalls FNA biopsy which was \"benign\".  Then  she noted enlarged neck lymph nodes.  Repeat FNA biopsy of the 3.0 cm right thyroid nodule showed follicular lesion of undetermined significance. She underwent total thyroidectomy 12/11/2015 after Frozen section confirmed a follicular lesion and a small papillary carcinoma of the thyroid .  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 No abnormal activity to suggest  metastatic thyroid cancer.  Follow-up neck ultrasound 7/2016: No enlarged or abnormal-appearing lymph nodes are appreciated in the right or left neck.  TG ( suppressed ) : 1.3 ( 1/27/16)  TG ( stimulated ) : 2.7 ( 2/3/16)  F/u : 8/2018- no mets identified.  TG appears suppressed as noted below.      2. Postsurgical hypothyroidism:  She was started on Levothyroxine 150 mcg after surgery, dose was increased to 175, then increased to  dose 200 mcg qd.  CUrrent  Dose: 200 mcg 6 days a week and skip X1 day a week.  On this dose X 2020.  No major concerns today.  Takes generic.  reports compliance.    Feeling fine  Energy is OK.  S/p menopause.     Chest pain, palpitations, tremors.  History of Crohn's disease and has on and off diarrhea.  Weight is mostly stable.  Family history is positive for mother with hypothyroidism.    No family history of thyroid cancer.    No personal history of radiation exposure to the head or neck.    She has a history of Chron's disease and has been treated with Remicade for many years.    She is a nurse.      Patient feels well at " this time and denies any tachycardia, palpitations, heat intolerance, tremor, insomnia, diarrhea, or unexplained weight loss.  Patient also denies  cold intolerance, constipation, or unexplained weight gain.     Wt Readings from Last 2 Encounters:   04/12/22 88 kg (193 lb 14.4 oz)   02/23/21 92.8 kg (204 lb 8 oz)       PMH/PSH:  Past Medical History:   Diagnosis Date     Cancer (H) 12/11/15    thyroid     Crohn's disease (H)      Depression      Endometriosis      GERD (gastroesophageal reflux disease)      Personal history of other genital system and obstetric disorders(V13.29)      Regional enteritis of small intestine (H) 2003    ileal disease     Thyroid disease     hypo     Past Surgical History:   Procedure Laterality Date     CHOLECYSTECTOMY       ENT SURGERY  12/2015    total thyroidectomy     THYROIDECTOMY N/A 12/11/2015    Procedure: THYROIDECTOMY;  Surgeon: Shari King MD;  Location:  OR     [facility] NONSPECIFIC PROCEDURE  2000    s/p cholecystectomy     Family Hx:  Family History   Problem Relation Age of Onset     Hypertension Mother      Cervical Cancer Mother      Gastrointestinal Disease Mother         diverticulitis     Cardiovascular Mother      Hypothyroidism Mother      Coronary Artery Disease Mother      Hypertension Father      Diabetes Paternal Grandmother      Cerebrovascular Disease Paternal Grandmother      Thyroid disease: Yes, mother         DM2: No         Autoimmune: DM1, SLE, RA, Vitiligo:  No    Social Hx:  Social History     Socioeconomic History     Marital status:      Spouse name: Not on file     Number of children: 2     Years of education: Not on file     Highest education level: Associate degree: occupational, technical, or vocational program   Occupational History     Occupation: nurse     Employer: [employer]     Comment: cardiology nurse   Tobacco Use     Smoking status: Never Smoker     Smokeless tobacco: Never Used   Vaping Use     Vaping Use:  Never used   Substance and Sexual Activity     Alcohol use: Yes     Comment: couple of times a year     Drug use: No     Sexual activity: Yes     Partners: Male     Birth control/protection: Surgical, Pill     Comment: Vasectomy   Other Topics Concern     Parent/sibling w/ CABG, MI or angioplasty before 65F 55M? Not Asked   Social History Narrative    Older son, Atul,  , with developmental delay     Social Determinants of Health     Financial Resource Strain: Medium Risk     Difficulty of Paying Living Expenses: Somewhat hard   Food Insecurity: No Food Insecurity     Worried About Running Out of Food in the Last Year: Never true     Ran Out of Food in the Last Year: Never true   Transportation Needs: No Transportation Needs     Lack of Transportation (Medical): No     Lack of Transportation (Non-Medical): No   Physical Activity: Not on file   Stress: Not on file   Social Connections: Not on file   Intimate Partner Violence: Not on file   Housing Stability: Not on file          MEDICATIONS:  has a current medication list which includes the following prescription(s): alprazolam, bupropion, buspirone hcl, drospirenone-ethinyl estradiol, fluoxetine, infliximab, lansoprazole, levothyroxine, levothyroxine, propranolol er, trazodone, and vitamin d.    Review of Systems  10 point ROS neg other than the symptoms noted above in the HPI.    Physical Exam   VS: There were no vitals taken for this visit.  GENERAL: healthy, alert and no distress  EYES: Eyes grossly normal to inspection, conjunctivae and sclerae normal  ENT: no nose swelling, nasal discharge.  Thyroid: s/p thyroidectomy  RESP: no audible wheeze, cough, or visible cyanosis.  No visible retractions or increased work of breathing.  Able to speak fully in complete sentences.  ABDO: not evaluated.  EXTREMITIES: no hand tremors.  NEURO: Cranial nerves grossly intact, mentation intact and speech normal  SKIN: No apparent skin lesions, rash or edema seen   PSYCH:  mentation appears normal, affect normal/bright, judgement and insight intact, normal speech and appearance well-groomed      LABS:  TG:  TG ( suppressed ) : 1.3 ( 1/27/16), TSH 7.83  TG ( stimulated ) : 2.7 ( 2/3/16), TSH 1.28    6/2016:  TSH: 1.28  JUAN: <0.4  TG: <0.1    12/2016:  TSH: 0.62  JUAN: <0.4  TG: <0.1    5/2020:  TSH: 0.07  JUAN: <0.4  TG: <0.1    4/2022:    TFTs:  ENDO THYROID LABS-[facility] Latest Ref Rng & Units 4/12/2022 2/23/2021   TSH 0.40 - 4.00 mU/L 0.51 0.89   FREE T3 2.3 - 4.2 pg/mL     FREE T4 0.76 - 1.46 ng/dL 1.49 (H) 1.40   THYROGLOBULIN SERGEY <40 IU/mL <20        FINAL DIAGNOSIS:  A, B, and C.  Specimens: Right thyroid lobe, prelaryngeal tissue, left thyroid  lobe.  Procedure: Total thyroidectomy.  Received: Fresh.  Specimen Integrity: Divided (thyroidectomy performed as  lobectomy and completion thyroidectomy).  Tumor Focality: Four (4) foci of microcarcinomas .  Tumor Laterality: Right lobe (x2) and left lobe (x2).    Tumor Histologic Type (all tumors): Papillary microcarcinoma;  follicular variant, infiltrative.    Tumor Size:  Tumor #1 (right lobe): 0.8 cm.  Tumor #2 (right lobe): 0.6 cm.  Tumor #3 (left lobe): 0.2 cm.  Tumor #4 (left lobe): 0.25 cm.    Margins:  Tumor #1: Involved right anterolateral margin over an area of 0.4  cm.  Tumor #2: Tumor within 0.1 cm of the nearest (right anterolateral)  margin.  Tumor #3: Tumor within fraction of a mm of the left anterolateral  and medial margins.  Tumor #4: Tumor within fraction of a millimeter of the left  anterolateral margin.    Regional Lymph Nodes: No nodes submitted or found.    Pathologic Staging (pTNM): pT1a(m), pNX, pM not applicable.    Additional Pathologic Findings:  -Hyperplastic nodules (largest 2 cm wide; within right lobe).  -Single normal-appearing parathyroid gland identified within left  lobe, mid region.  -Prelaryngeal tissue (specimen C) comprises of benign fibrovascular  tissue without lymph nodes.  Prior biopsy site  changes.      NM I-131 THYROID THERAPY  2/3/2016 9:38 AM     HISTORY: Thyroid cancer.     TECHNIQUE: Patient was given 100 mCi I-131orally and instructed in  home care.         IMPRESSION: Successful I-131 therapy administration.       NUCLEAR MEDICINE I-131 WHOLE BODY SCAN February 10, 2016 8:08 AM       HISTORY: Postprocedural hypothyroidism. Hypocalcemia. Malignant  neoplasm of thyroid gland, follow-up as     COMPARISON: None.     TECHNIQUE: Patient was given I-131 orally prior to the scan followed  by whole body scan.       DOSE: Seven days previously the patient received 106.6 mCi I-131.     FINDINGS: Physiologic activity noted. No abnormal activity to suggest  metastatic thyroid cancer.           IMPRESSION: No metastatic disease demonstrated.    Follow up Neck US 7/2016:  HEAD AND NECK SOFT TISSUE ULTRASOUND  7/18/2016  2:44 PM       HISTORY: Postprocedural hypothyroidism, Malignant neoplasm of thyroid  gland       FINDINGS: Anterior neck ultrasound shows no residual thyroid tissue.  Multiple bilateral normal-appearing lymph nodes are present. No  enlarged or abnormal-appearing lymph nodes are evident in the right or  left neck.                                                                       IMPRESSION: No enlarged or abnormal-appearing lymph nodes are  appreciated in the right or left neck.    WBC I123:  NUCLEAR MEDICINE I-123 WHOLE BODY SCAN  August 16, 2018 12:06 PM      HISTORY: Postsurgical hypothyroidism. Thyroid cancer (H). Status post  total thyroidectomy.     COMPARISON: None.     TECHNIQUE: Patient was given I-123 orally followed by whole body scan.        DOSE: 2.2mci I123 in 8 capsules on 8/15/18 by LB     FINDINGS: Physiologic activity noted. No abnormal activity to suggest  metastatic thyroid cancer.                                                                       IMPRESSION: No metastatic disease demonstrated.        All pertinent notes, labs and images personally reviewed by me.      A/P  Ms.Heather SERGE Rowell is a 49 year old here for the evaluation of:    #1.  PTC:   FNA biopsy of the 3.0 cm right thyroid nodule showed follicular lesion of undetermined significance. 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 No abnormal activity to suggest metastatic thyroid cancer.  Follow up US 6/2016- no mets  F/u I123 scan 8/2018- no mets  -- TG appear stable  Plan:   Discussed diagnosis, pathophysiology, management and treatment options of condition with pt.  Due for Ultrasound.  Follow up after that.    #2. Postsurgical hypothyroidism:    CUrrent  Dose: 200 mcg X 6 days a week and skip X  1 day a week.  On this dose X 2020.  No major concerns today.  Generic.   Clinically looks euthyroid. Goal TSH <1.0  Wt stable.  Plan:  Discussed diagnosis, pathophysiology, management and treatment options of condition with pt.  Based on 4/2022 labs- Continue current dose of levothyroxine- take 200 mcg X 6 days a week and skip X1 day a week.  Labs in 3-4 months to follow trend.( noted slightly high FT4 but no major clinical concerns)  Please make a lab appointment for blood work and follow up clinic appointment in 1 week after that to discuss results.    Discussed s/s of hypothyroidism and hyperthyroidism to watch for.  The patient indicates understanding of these issues and agrees with the plan.      Follow-up:  3-4 months    Alice Donis MD  Endocrinology    [facility]    Addendum to above note and clinic visit:    Labs reviewed.    See result note/telephone encounter.

## 2022-06-14 ENCOUNTER — TELEPHONE (OUTPATIENT)
Dept: ENDOCRINOLOGY | Facility: CLINIC | Age: 52
End: 2022-06-14

## 2022-06-14 NOTE — TELEPHONE ENCOUNTER
"Writer tried to contact patient to schedule follow up per Dr. Donis, but was unable to reach the patient. Writer left a generic message for patient to call back. If patient calls back, please assist in scheduling appointment per Dr. Donis's notes below.    \"Labs in 3-4 months ( around 9/13/22) to follow trend.  Neck ultrasound needed.  Please make a lab appointment for blood work and follow up clinic appointment in 1 week after that to discuss results.\"     Toya AVALOS Virtual Visit Facilitator 9:14 AM June 14, 2022        "

## 2022-07-26 DIAGNOSIS — F33.1 MODERATE RECURRENT MAJOR DEPRESSION (H): ICD-10-CM

## 2022-07-28 RX ORDER — BUPROPION HYDROCHLORIDE 150 MG/1
TABLET ORAL
Qty: 270 TABLET | Refills: 4 | OUTPATIENT
Start: 2022-07-28

## 2022-08-05 ENCOUNTER — TRANSFERRED RECORDS (OUTPATIENT)
Dept: HEALTH INFORMATION MANAGEMENT | Facility: CLINIC | Age: 52
End: 2022-08-05

## 2022-09-07 ENCOUNTER — MYC REFILL (OUTPATIENT)
Dept: PEDIATRICS | Facility: CLINIC | Age: 52
End: 2022-09-07

## 2022-09-07 DIAGNOSIS — F40.243 FEAR OF FLYING: ICD-10-CM

## 2022-09-07 RX ORDER — ALPRAZOLAM 0.25 MG
.25-.5 TABLET ORAL 2 TIMES DAILY PRN
Qty: 8 TABLET | Refills: 0 | Status: SHIPPED | OUTPATIENT
Start: 2022-09-07 | End: 2023-06-30

## 2022-09-07 NOTE — TELEPHONE ENCOUNTER
Routing refill request to provider for review/approval because:  Drug not on the [facility] refill protocol     Miguel GARCIA RN

## 2022-10-12 ENCOUNTER — TRANSFERRED RECORDS (OUTPATIENT)
Dept: HEALTH INFORMATION MANAGEMENT | Facility: CLINIC | Age: 52
End: 2022-10-12

## 2022-10-12 LAB
ALT SERPL-CCNC: 8 IU/L (ref 0–32)
AST SERPL-CCNC: 13 IU/L (ref 0–40)

## 2022-10-16 ENCOUNTER — HEALTH MAINTENANCE LETTER (OUTPATIENT)
Age: 52
End: 2022-10-16

## 2022-12-01 ENCOUNTER — TRANSFERRED RECORDS (OUTPATIENT)
Dept: HEALTH INFORMATION MANAGEMENT | Facility: CLINIC | Age: 52
End: 2022-12-01

## 2022-12-08 ENCOUNTER — TRANSFERRED RECORDS (OUTPATIENT)
Dept: HEALTH INFORMATION MANAGEMENT | Facility: CLINIC | Age: 52
End: 2022-12-08

## 2022-12-19 ENCOUNTER — TELEPHONE (OUTPATIENT)
Dept: ENDOCRINOLOGY | Facility: CLINIC | Age: 52
End: 2022-12-19

## 2022-12-19 DIAGNOSIS — E89.0 S/P TOTAL THYROIDECTOMY: ICD-10-CM

## 2022-12-19 DIAGNOSIS — C73 THYROID CANCER (H): ICD-10-CM

## 2022-12-20 ENCOUNTER — LAB (OUTPATIENT)
Dept: LAB | Facility: CLINIC | Age: 52
End: 2022-12-20
Payer: COMMERCIAL

## 2022-12-20 DIAGNOSIS — E89.0 S/P TOTAL THYROIDECTOMY: ICD-10-CM

## 2022-12-20 DIAGNOSIS — D09.9 PAPILLARY CARCINOMA IN SITU: ICD-10-CM

## 2022-12-20 DIAGNOSIS — Z13.220 SCREENING FOR HYPERLIPIDEMIA: Primary | ICD-10-CM

## 2022-12-20 DIAGNOSIS — E89.0 POSTSURGICAL HYPOTHYROIDISM: ICD-10-CM

## 2022-12-20 LAB
CHOLEST SERPL-MCNC: 216 MG/DL
HDLC SERPL-MCNC: 70 MG/DL
LDLC SERPL CALC-MCNC: 123 MG/DL
NONHDLC SERPL-MCNC: 146 MG/DL
TRIGL SERPL-MCNC: 113 MG/DL
TSH SERPL DL<=0.005 MIU/L-ACNC: 0.92 UIU/ML (ref 0.3–4.2)

## 2022-12-20 PROCEDURE — 84439 ASSAY OF FREE THYROXINE: CPT

## 2022-12-20 PROCEDURE — 84439 ASSAY OF FREE THYROXINE: CPT | Mod: 90

## 2022-12-20 PROCEDURE — 99000 SPECIMEN HANDLING OFFICE-LAB: CPT

## 2022-12-20 PROCEDURE — 84443 ASSAY THYROID STIM HORMONE: CPT

## 2022-12-20 PROCEDURE — 80061 LIPID PANEL: CPT

## 2022-12-20 PROCEDURE — 84432 ASSAY OF THYROGLOBULIN: CPT | Mod: 90

## 2022-12-20 PROCEDURE — 86800 THYROGLOBULIN ANTIBODY: CPT | Mod: 90

## 2022-12-20 PROCEDURE — 36415 COLL VENOUS BLD VENIPUNCTURE: CPT

## 2022-12-21 LAB — T4 FREE SERPL-MCNC: 1.68 NG/DL (ref 0.9–1.7)

## 2022-12-21 RX ORDER — LEVOTHYROXINE SODIUM 200 UG/1
TABLET ORAL
Qty: 78 TABLET | Refills: 1 | Status: SHIPPED | OUTPATIENT
Start: 2022-12-21 | End: 2023-06-29

## 2022-12-25 LAB — T4 FREE SERPL DIALY-MCNC: 2 NG/DL

## 2022-12-27 NOTE — RESULT ENCOUNTER NOTE
Meeta    Recently done endocrinology lab test/ imaging test showed:  Thyroid labs are in acceptable range.  Continue current dose of thyroid hormone replacement.  One more lab is pending.    Here is a copy for your records.    Follow up as discussed in last clinic visit.    Please call endocrinology clinic ( [phone]) if questions.    Alice Donis MD  Endocrinology   [facility]  December 27, 2022

## 2023-01-10 LAB — SCANNED LAB RESULT: NORMAL

## 2023-02-06 ENCOUNTER — TRANSFERRED RECORDS (OUTPATIENT)
Dept: HEALTH INFORMATION MANAGEMENT | Facility: CLINIC | Age: 53
End: 2023-02-06
Payer: COMMERCIAL

## 2023-03-26 ENCOUNTER — HEALTH MAINTENANCE LETTER (OUTPATIENT)
Age: 53
End: 2023-03-26

## 2023-04-07 ENCOUNTER — TRANSFERRED RECORDS (OUTPATIENT)
Dept: HEALTH INFORMATION MANAGEMENT | Facility: CLINIC | Age: 53
End: 2023-04-07
Payer: COMMERCIAL

## 2023-04-07 LAB
ALT SERPL-CCNC: 8 IU/L (ref 0–32)
AST SERPL-CCNC: 10 IU/L (ref 0–40)

## 2023-04-29 DIAGNOSIS — F51.04 PSYCHOPHYSIOLOGICAL INSOMNIA: ICD-10-CM

## 2023-04-29 DIAGNOSIS — F41.1 GENERALIZED ANXIETY DISORDER: ICD-10-CM

## 2023-05-02 RX ORDER — FLUOXETINE 40 MG/1
CAPSULE ORAL
Qty: 180 CAPSULE | Refills: 0 | Status: SHIPPED | OUTPATIENT
Start: 2023-05-02 | End: 2023-06-30

## 2023-05-02 RX ORDER — TRAZODONE HYDROCHLORIDE 100 MG/1
TABLET ORAL
Qty: 360 TABLET | Refills: 0 | Status: SHIPPED | OUTPATIENT
Start: 2023-05-02 | End: 2023-06-30

## 2023-05-02 NOTE — TELEPHONE ENCOUNTER
Routing refill request to provider for review/approval because:  Drug interaction warning    Ivon DIETZ RN, BSN

## 2023-05-15 DIAGNOSIS — F33.1 MODERATE RECURRENT MAJOR DEPRESSION (H): ICD-10-CM

## 2023-05-16 RX ORDER — BUSPIRONE HYDROCHLORIDE 30 MG/1
30 TABLET ORAL 2 TIMES DAILY
Qty: 180 TABLET | Refills: 0 | Status: SHIPPED | OUTPATIENT
Start: 2023-05-16 | End: 2023-06-30

## 2023-05-16 NOTE — TELEPHONE ENCOUNTER
[facility] refill. Further refills at upcoming appointment with Dr. Weinberg 6/30/23.    PHQ-9 outdated, but within normal range      4/9/2021     7:21 AM 6/4/2021    10:28 AM 4/12/2022     2:39 PM   PHQ   PHQ-9 Total Score 5 4 4   Q9: Thoughts of better off dead/self-harm past 2 weeks Not at all Not at all Not at all       Teetee GARCIA RN  [facility]

## 2023-05-17 DIAGNOSIS — F33.1 MODERATE RECURRENT MAJOR DEPRESSION (H): ICD-10-CM

## 2023-05-18 RX ORDER — BUPROPION HYDROCHLORIDE 150 MG/1
450 TABLET ORAL EVERY MORNING
Qty: 270 TABLET | Refills: 0 | Status: SHIPPED | OUTPATIENT
Start: 2023-05-18 | End: 2023-06-30

## 2023-05-23 DIAGNOSIS — F41.1 GENERALIZED ANXIETY DISORDER: ICD-10-CM

## 2023-05-23 DIAGNOSIS — K21.9 GASTROESOPHAGEAL REFLUX DISEASE WITHOUT ESOPHAGITIS: ICD-10-CM

## 2023-05-24 RX ORDER — PROPRANOLOL HCL 60 MG
CAPSULE, EXTENDED RELEASE 24HR ORAL
Qty: 90 CAPSULE | Refills: 0 | Status: SHIPPED | OUTPATIENT
Start: 2023-05-24 | End: 2023-06-30

## 2023-05-24 RX ORDER — LANSOPRAZOLE 30 MG/1
CAPSULE, DELAYED RELEASE ORAL
Qty: 90 CAPSULE | Refills: 0 | Status: SHIPPED | OUTPATIENT
Start: 2023-05-24 | End: 2023-06-30

## 2023-06-08 ENCOUNTER — TRANSFERRED RECORDS (OUTPATIENT)
Dept: HEALTH INFORMATION MANAGEMENT | Facility: CLINIC | Age: 53
End: 2023-06-08
Payer: COMMERCIAL

## 2023-06-17 ENCOUNTER — HEALTH MAINTENANCE LETTER (OUTPATIENT)
Age: 53
End: 2023-06-17

## 2023-06-27 ENCOUNTER — TELEPHONE (OUTPATIENT)
Dept: ENDOCRINOLOGY | Facility: CLINIC | Age: 53
End: 2023-06-27

## 2023-06-27 DIAGNOSIS — Z90.89 S/P TOTAL THYROIDECTOMY: ICD-10-CM

## 2023-06-27 DIAGNOSIS — Z98.890 S/P TOTAL THYROIDECTOMY: ICD-10-CM

## 2023-06-27 DIAGNOSIS — C73 THYROID CANCER (H): ICD-10-CM

## 2023-06-27 NOTE — TELEPHONE ENCOUNTER
"Requested Prescriptions   Pending Prescriptions Disp Refills     levothyroxine (SYNTHROID/LEVOTHROID) 200 MCG tablet [Pharmacy Med Name: LEVOTHYROXINE 200 MCG TABLET] 78 tablet 1     Sig: TAKE ONE TABLET (200MCG) BY MOUTH ONCE DAILY SIX DAYS PER WEEK AND SKIP ONE DAY PER WEEK       Thyroid Protocol Failed - 6/27/2023  8:47 AM        Failed - Recent (12 mo) or future (30 days) visit within the authorizing provider's specialty     Patient has had an office visit with the authorizing provider or a provider within the authorizing providers department within the previous 12 mos or has a future within next 30 days. See \"Patient Info\" tab in inbasket, or \"Choose Columns\" in Meds & Orders section of the refill encounter.              Passed - Patient is 12 years or older        Passed - Medication is active on med list        Passed - Normal TSH on file in past 12 months     Recent Labs   Lab Test 12/20/22  1518   TSH 0.92              Passed - No active pregnancy on record     If patient is pregnant or has had a positive pregnancy test, please check TSH.          Passed - No positive pregnancy test in past 12 months     If patient is pregnant or has had a positive pregnancy test, please check TSH.               "

## 2023-06-28 NOTE — TELEPHONE ENCOUNTER
Okay to send limited prescription till next appointment  She will need labs prior to next appointment as well.  Please place labs ordered if needed     Latest Ref Rng 12/20/2022  3:18 PM   ENDO THYROID LABS-[facility]     TSH 0.30 - 4.20 uIU/mL 0.92    Free T3 2.3 - 4.2 pg/mL    FREE T4 0.90 - 1.70 ng/dL 1.68         Island Pedicle Flap Text: The defect edges were debeveled with a #15c scalpel blade.  Given the location of the defect, shape of the defect and the proximity to free margins an island pedicle advancement flap was deemed most appropriate.  Using a sterile surgical marker, an appropriate advancement flap was drawn incorporating the defect, outlining the appropriate donor tissue and placing the expected incisions within the relaxed skin tension lines where possible.    The area thus outlined was incised deep to adipose tissue with a #15 scalpel blade.  The skin margins were undermined to an appropriate distance in all directions around the primary defect and laterally outward around the island pedicle utilizing iris scissors.  There was minimal undermining beneath the pedicle flap.

## 2023-06-29 RX ORDER — LEVOTHYROXINE SODIUM 200 UG/1
TABLET ORAL
Qty: 78 TABLET | Refills: 0 | Status: SHIPPED | OUTPATIENT
Start: 2023-06-29 | End: 2023-06-30

## 2023-06-29 SDOH — HEALTH STABILITY: PHYSICAL HEALTH: ON AVERAGE, HOW MANY DAYS PER WEEK DO YOU ENGAGE IN MODERATE TO STRENUOUS EXERCISE (LIKE A BRISK WALK)?: 0 DAYS

## 2023-06-29 SDOH — ECONOMIC STABILITY: INCOME INSECURITY: IN THE LAST 12 MONTHS, WAS THERE A TIME WHEN YOU WERE NOT ABLE TO PAY THE MORTGAGE OR RENT ON TIME?: NO

## 2023-06-29 SDOH — HEALTH STABILITY: PHYSICAL HEALTH: ON AVERAGE, HOW MANY MINUTES DO YOU ENGAGE IN EXERCISE AT THIS LEVEL?: 0 MIN

## 2023-06-29 SDOH — ECONOMIC STABILITY: FOOD INSECURITY: WITHIN THE PAST 12 MONTHS, YOU WORRIED THAT YOUR FOOD WOULD RUN OUT BEFORE YOU GOT MONEY TO BUY MORE.: NEVER TRUE

## 2023-06-29 SDOH — ECONOMIC STABILITY: INCOME INSECURITY: HOW HARD IS IT FOR YOU TO PAY FOR THE VERY BASICS LIKE FOOD, HOUSING, MEDICAL CARE, AND HEATING?: NOT VERY HARD

## 2023-06-29 SDOH — ECONOMIC STABILITY: FOOD INSECURITY: WITHIN THE PAST 12 MONTHS, THE FOOD YOU BOUGHT JUST DIDN'T LAST AND YOU DIDN'T HAVE MONEY TO GET MORE.: NEVER TRUE

## 2023-06-29 ASSESSMENT — ENCOUNTER SYMPTOMS
HEMATOCHEZIA: 0
FEVER: 0
SORE THROAT: 0
PALPITATIONS: 0
MYALGIAS: 0
EYE PAIN: 0
HEARTBURN: 0
HEMATURIA: 0
NERVOUS/ANXIOUS: 0
DYSURIA: 0
SHORTNESS OF BREATH: 0
DIARRHEA: 0
JOINT SWELLING: 0
CHILLS: 0
BREAST MASS: 0
WEAKNESS: 0
ABDOMINAL PAIN: 0
COUGH: 0
DIZZINESS: 1
FREQUENCY: 0
HEADACHES: 1
PARESTHESIAS: 0
NAUSEA: 0
CONSTIPATION: 0
ARTHRALGIAS: 0

## 2023-06-29 ASSESSMENT — PATIENT HEALTH QUESTIONNAIRE - PHQ9
10. IF YOU CHECKED OFF ANY PROBLEMS, HOW DIFFICULT HAVE THESE PROBLEMS MADE IT FOR YOU TO DO YOUR WORK, TAKE CARE OF THINGS AT HOME, OR GET ALONG WITH OTHER PEOPLE: SOMEWHAT DIFFICULT
SUM OF ALL RESPONSES TO PHQ QUESTIONS 1-9: 5
SUM OF ALL RESPONSES TO PHQ QUESTIONS 1-9: 5

## 2023-06-29 ASSESSMENT — SOCIAL DETERMINANTS OF HEALTH (SDOH)
DO YOU BELONG TO ANY CLUBS OR ORGANIZATIONS SUCH AS CHURCH GROUPS UNIONS, FRATERNAL OR ATHLETIC GROUPS, OR SCHOOL GROUPS?: NO
HOW OFTEN DO YOU GET TOGETHER WITH FRIENDS OR RELATIVES?: ONCE A WEEK
IN A TYPICAL WEEK, HOW MANY TIMES DO YOU TALK ON THE PHONE WITH FAMILY, FRIENDS, OR NEIGHBORS?: ONCE A WEEK
HOW OFTEN DO YOU ATTEND CHURCH OR RELIGIOUS SERVICES?: NEVER

## 2023-06-29 ASSESSMENT — LIFESTYLE VARIABLES
HOW OFTEN DO YOU HAVE A DRINK CONTAINING ALCOHOL: MONTHLY OR LESS
HOW MANY STANDARD DRINKS CONTAINING ALCOHOL DO YOU HAVE ON A TYPICAL DAY: 1 OR 2
HOW OFTEN DO YOU HAVE SIX OR MORE DRINKS ON ONE OCCASION: LESS THAN MONTHLY
SKIP TO QUESTIONS 9-10: 0
AUDIT-C TOTAL SCORE: 2

## 2023-06-30 ENCOUNTER — OFFICE VISIT (OUTPATIENT)
Dept: PEDIATRICS | Facility: CLINIC | Age: 53
End: 2023-06-30
Payer: COMMERCIAL

## 2023-06-30 VITALS
WEIGHT: 194.8 LBS | SYSTOLIC BLOOD PRESSURE: 114 MMHG | TEMPERATURE: 98.4 F | RESPIRATION RATE: 16 BRPM | OXYGEN SATURATION: 96 % | BODY MASS INDEX: 36.78 KG/M2 | HEART RATE: 62 BPM | HEIGHT: 61 IN | DIASTOLIC BLOOD PRESSURE: 72 MMHG

## 2023-06-30 DIAGNOSIS — K21.9 GASTROESOPHAGEAL REFLUX DISEASE WITHOUT ESOPHAGITIS: ICD-10-CM

## 2023-06-30 DIAGNOSIS — R73.01 IMPAIRED FASTING GLUCOSE: ICD-10-CM

## 2023-06-30 DIAGNOSIS — F40.243 FEAR OF FLYING: ICD-10-CM

## 2023-06-30 DIAGNOSIS — F51.04 PSYCHOPHYSIOLOGICAL INSOMNIA: ICD-10-CM

## 2023-06-30 DIAGNOSIS — D84.9 IMMUNOSUPPRESSED STATUS (H): ICD-10-CM

## 2023-06-30 DIAGNOSIS — F41.1 GENERALIZED ANXIETY DISORDER: ICD-10-CM

## 2023-06-30 DIAGNOSIS — Z30.41 ENCOUNTER FOR SURVEILLANCE OF CONTRACEPTIVE PILLS: ICD-10-CM

## 2023-06-30 DIAGNOSIS — Z12.11 SCREEN FOR COLON CANCER: ICD-10-CM

## 2023-06-30 DIAGNOSIS — D64.9 ANEMIA, UNSPECIFIED TYPE: ICD-10-CM

## 2023-06-30 DIAGNOSIS — Z85.850 HISTORY OF THYROID CANCER: ICD-10-CM

## 2023-06-30 DIAGNOSIS — Z12.31 VISIT FOR SCREENING MAMMOGRAM: ICD-10-CM

## 2023-06-30 DIAGNOSIS — E89.0 POSTSURGICAL HYPOTHYROIDISM: ICD-10-CM

## 2023-06-30 DIAGNOSIS — K50.90 CROHN'S DISEASE WITHOUT COMPLICATION, UNSPECIFIED GASTROINTESTINAL TRACT LOCATION (H): ICD-10-CM

## 2023-06-30 DIAGNOSIS — Z00.00 ROUTINE GENERAL MEDICAL EXAMINATION AT A HEALTH CARE FACILITY: Primary | ICD-10-CM

## 2023-06-30 DIAGNOSIS — F33.1 MODERATE RECURRENT MAJOR DEPRESSION (H): ICD-10-CM

## 2023-06-30 LAB
ERYTHROCYTE [DISTWIDTH] IN BLOOD BY AUTOMATED COUNT: 15.7 % (ref 10–15)
FOLATE SERPL-MCNC: 5.8 NG/ML (ref 4.6–34.8)
HBA1C MFR BLD: 5.8 % (ref 0–5.6)
HCT VFR BLD AUTO: 36.6 % (ref 35–47)
HGB BLD-MCNC: 11.1 G/DL (ref 11.7–15.7)
IRON BINDING CAPACITY (ROCHE): 467 UG/DL (ref 240–430)
IRON SATN MFR SERPL: 12 % (ref 15–46)
IRON SERPL-MCNC: 54 UG/DL (ref 37–145)
MCH RBC QN AUTO: 24.4 PG (ref 26.5–33)
MCHC RBC AUTO-ENTMCNC: 30.3 G/DL (ref 31.5–36.5)
MCV RBC AUTO: 81 FL (ref 78–100)
PLATELET # BLD AUTO: 293 10E3/UL (ref 150–450)
RBC # BLD AUTO: 4.54 10E6/UL (ref 3.8–5.2)
T4 FREE SERPL-MCNC: 1.96 NG/DL (ref 0.9–1.7)
TSH SERPL DL<=0.005 MIU/L-ACNC: 0.14 UIU/ML (ref 0.3–4.2)
VIT B12 SERPL-MCNC: 234 PG/ML (ref 232–1245)
WBC # BLD AUTO: 8.2 10E3/UL (ref 4–11)

## 2023-06-30 PROCEDURE — 82607 VITAMIN B-12: CPT | Performed by: INTERNAL MEDICINE

## 2023-06-30 PROCEDURE — 83540 ASSAY OF IRON: CPT | Performed by: INTERNAL MEDICINE

## 2023-06-30 PROCEDURE — 84439 ASSAY OF FREE THYROXINE: CPT | Performed by: INTERNAL MEDICINE

## 2023-06-30 PROCEDURE — 83550 IRON BINDING TEST: CPT | Performed by: INTERNAL MEDICINE

## 2023-06-30 PROCEDURE — 84443 ASSAY THYROID STIM HORMONE: CPT | Performed by: INTERNAL MEDICINE

## 2023-06-30 PROCEDURE — 99396 PREV VISIT EST AGE 40-64: CPT | Performed by: INTERNAL MEDICINE

## 2023-06-30 PROCEDURE — 83036 HEMOGLOBIN GLYCOSYLATED A1C: CPT | Performed by: INTERNAL MEDICINE

## 2023-06-30 PROCEDURE — 85027 COMPLETE CBC AUTOMATED: CPT | Performed by: INTERNAL MEDICINE

## 2023-06-30 PROCEDURE — 36415 COLL VENOUS BLD VENIPUNCTURE: CPT | Performed by: INTERNAL MEDICINE

## 2023-06-30 PROCEDURE — 82746 ASSAY OF FOLIC ACID SERUM: CPT | Performed by: INTERNAL MEDICINE

## 2023-06-30 RX ORDER — LANSOPRAZOLE 30 MG/1
30 CAPSULE, DELAYED RELEASE ORAL DAILY
Qty: 90 CAPSULE | Refills: 3 | Status: SHIPPED | OUTPATIENT
Start: 2023-06-30 | End: 2024-06-14

## 2023-06-30 RX ORDER — PROPRANOLOL HCL 60 MG
60 CAPSULE, EXTENDED RELEASE 24HR ORAL DAILY
Qty: 90 CAPSULE | Refills: 3 | Status: SHIPPED | OUTPATIENT
Start: 2023-06-30 | End: 2024-06-14

## 2023-06-30 RX ORDER — ALPRAZOLAM 0.25 MG
.25-.5 TABLET ORAL 2 TIMES DAILY PRN
Qty: 8 TABLET | Refills: 0 | Status: SHIPPED | OUTPATIENT
Start: 2023-06-30 | End: 2023-09-14

## 2023-06-30 RX ORDER — TRAZODONE HYDROCHLORIDE 100 MG/1
TABLET ORAL
Qty: 360 TABLET | Refills: 3 | Status: SHIPPED | OUTPATIENT
Start: 2023-06-30 | End: 2024-06-14

## 2023-06-30 RX ORDER — BUPROPION HYDROCHLORIDE 150 MG/1
450 TABLET ORAL EVERY MORNING
Qty: 270 TABLET | Refills: 3 | Status: SHIPPED | OUTPATIENT
Start: 2023-06-30 | End: 2024-06-14

## 2023-06-30 RX ORDER — BUSPIRONE HYDROCHLORIDE 30 MG/1
TABLET ORAL
Qty: 135 TABLET | Refills: 3 | Status: SHIPPED | OUTPATIENT
Start: 2023-06-30 | End: 2024-04-03

## 2023-06-30 RX ORDER — DROSPIRENONE AND ETHINYL ESTRADIOL 0.03MG-3MG
1 KIT ORAL DAILY
Qty: 84 TABLET | Refills: 3 | Status: SHIPPED | OUTPATIENT
Start: 2023-06-30 | End: 2024-04-24

## 2023-06-30 RX ORDER — FLUOXETINE 40 MG/1
80 CAPSULE ORAL DAILY
Qty: 180 CAPSULE | Refills: 3 | Status: SHIPPED | OUTPATIENT
Start: 2023-06-30 | End: 2024-06-14

## 2023-06-30 ASSESSMENT — ANXIETY QUESTIONNAIRES
5. BEING SO RESTLESS THAT IT IS HARD TO SIT STILL: NOT AT ALL
3. WORRYING TOO MUCH ABOUT DIFFERENT THINGS: SEVERAL DAYS
2. NOT BEING ABLE TO STOP OR CONTROL WORRYING: SEVERAL DAYS
GAD7 TOTAL SCORE: 3
GAD7 TOTAL SCORE: 3
7. FEELING AFRAID AS IF SOMETHING AWFUL MIGHT HAPPEN: NOT AT ALL
6. BECOMING EASILY ANNOYED OR IRRITABLE: SEVERAL DAYS
1. FEELING NERVOUS, ANXIOUS, OR ON EDGE: NOT AT ALL
IF YOU CHECKED OFF ANY PROBLEMS ON THIS QUESTIONNAIRE, HOW DIFFICULT HAVE THESE PROBLEMS MADE IT FOR YOU TO DO YOUR WORK, TAKE CARE OF THINGS AT HOME, OR GET ALONG WITH OTHER PEOPLE: SOMEWHAT DIFFICULT

## 2023-06-30 ASSESSMENT — ENCOUNTER SYMPTOMS
FREQUENCY: 0
SHORTNESS OF BREATH: 0
BREAST MASS: 0
HEMATOCHEZIA: 0
FEVER: 0
PARESTHESIAS: 0
NERVOUS/ANXIOUS: 0
PALPITATIONS: 0
JOINT SWELLING: 0
COUGH: 0
CHILLS: 0
SORE THROAT: 0
DIARRHEA: 0
ARTHRALGIAS: 0
DIZZINESS: 1
HEARTBURN: 0
MYALGIAS: 0
EYE PAIN: 0
NAUSEA: 0
HEADACHES: 1
DYSURIA: 0
CONSTIPATION: 0
ABDOMINAL PAIN: 0
HEMATURIA: 0
WEAKNESS: 0

## 2023-06-30 ASSESSMENT — PATIENT HEALTH QUESTIONNAIRE - PHQ9: 5. POOR APPETITE OR OVEREATING: NOT AT ALL

## 2023-06-30 NOTE — PATIENT INSTRUCTIONS
Preventive Health Recommendations  Female Ages 50 - 64    Yearly exam: See your health care provider every year in order to  Review health changes.   Discuss preventive care.    Review your medicines if your doctor has prescribed any.    Get a Pap test in 2024  Have a mammogram every 1 to 2 years.  Have a colonoscopy this summer  Have a cholesterol test every 5 years, or more often if advised.  Have a diabetes test (fasting glucose) every year      Shots: Get a flu shot each year and the covid booster this fall.  Get the prevnar 20 pneumonia shot in the fall with the flu and covid. Get a tetanus shot every 10 years - you are due in 2027.  Get your shingles shot at your pharmacy.    Nutrition:   Eat at least 5 servings of fruits and vegetables each day.  Eat whole-grain bread, whole-wheat pasta and brown rice instead of white grains and rice.  Get adequate Calcium and Vitamin D.     Lifestyle  Exercise at least 150 minutes a week (30 minutes a day, 5 days a week). This will help you control your weight and prevent disease.  Limit alcohol to one drink per day.  No smoking.   Wear sunscreen to prevent skin cancer.   See your dentist every six months for an exam and cleaning.  See your eye doctor every 1 to 2 years.      I encourage you to increase your activity to a goal of at least 150 mins/wk or 10,000 steps/day.  However, every little bit of activity you do is good for you!  Some methods to make exercise more fun (or less unfun) include:  Be social: exercise with friends, a group, or a good, qualified .  Entertain yourself: listen to music, podcasts, or books, or watch a movie.  Exercise outside in a beautiful environment.  Dance or play sports and games.  Variety is enjoyable - experiment and mix things up.  Choose realistic goals based on time, not performance, so you don t set yourself up for disappointment.  Reward yourself for exercising.  Remember to start slow and you can gradually increase.   Exercising takes time to become less unpleasant and hopefully eventually becomes enjoyable. Because we were not designed to be inactive and out of shape, the changes in our bodies that make physical activity feel rewarding and become a habit develop only after the several months of effort it takes to improve fitness. Slowly and gradually, exercise switches from being a negative feedback loop in which discomfort and lack of reward discourage us from exercising again to being a positive feedback loop in which exercise becomes satisfying. (Suggestions from Darnell Mendiola, for more information on his work: [email or web address])

## 2023-06-30 NOTE — PROGRESS NOTES
SUBJECTIVE:   CC: Meeta is an 52 year old who presents for preventive health visit.       2023     1:51 PM   Additional Questions   Roomed by Ayesha Watkins CMA   Accompanied by self         2023     1:51 PM   Patient Reported Additional Medications   Patient reports taking the following new medications no     Healthy Habits:     Getting at least 3 servings of Calcium per day:  NO    Bi-annual eye exam:  Yes    Dental care twice a year:  NO    Sleep apnea or symptoms of sleep apnea:  Daytime drowsiness    Diet:  Other    Frequency of exercise:  1 day/week    Duration of exercise:  Less than 15 minutes    Taking medications regularly:  Yes    Additional concerns today:  No  exercise - trying to get into it.  Doing zoomba at home sometime.  Weight - doing weight watchers since April.  Never has been a high energy person.  Wakes up feeling ok.      Today's PHQ-9 Score:       2023    10:11 PM   PHQ-9 SCORE   PHQ-9 Total Score MyChart 5 (Mild depression)   PHQ-9 Total Score 5         Social History     Tobacco Use    Smoking status: Never    Smokeless tobacco: Never   Substance Use Topics    Alcohol use: Yes     Comment: couple of times a year             2023    10:15 PM   Alcohol Use   Prescreen: >3 drinks/day or >7 drinks/week? No     Reviewed orders with patient.  Reviewed health maintenance and updated orders accordingly - Yes  Labs reviewed in EPIC    Breast Cancer Screenin/29/2023    10:19 PM   Breast CA Risk Assessment (FHS-7)   Do you have a family history of breast, colon, or ovarian cancer? No / Unknown     Mammogram Screening: Recommended annual mammography  Pertinent mammograms are reviewed under the imaging tab.    History of abnormal Pap smear: NO - age 30-65 PAP every 5 years with negative HPV co-testing recommended      Latest Ref Rng & Units 2021     2:34 PM 2021     2:10 PM 2015     1:30 PM   PAP / HPV   PAP (Historical)   NIL     HPV 16 DNA  "NEG^Negative Negative   Negative    HPV 18 DNA NEG^Negative Negative   Negative    Other HR HPV NEG^Negative Negative   Negative      Reviewed and updated as needed this visit by clinical staff   Tobacco  Allergies  Meds  Problems  Med Hx  Surg Hx  Fam Hx          Reviewed and updated as needed this visit by Provider   Tobacco  Allergies  Meds  Problems  Med Hx  Surg Hx  Fam Hx             Review of Systems   Constitutional: Negative for chills and fever.   HENT: Negative for congestion, ear pain, hearing loss and sore throat.    Eyes: Negative for pain and visual disturbance.   Respiratory: Negative for cough and shortness of breath.    Cardiovascular: Negative for chest pain, palpitations and peripheral edema.   Gastrointestinal: Negative for abdominal pain, constipation, diarrhea, heartburn, hematochezia and nausea.   Breasts:  Negative for tenderness, breast mass and discharge.   Genitourinary: Negative for dysuria, frequency, genital sores, hematuria, pelvic pain, urgency, vaginal bleeding and vaginal discharge.   Musculoskeletal: Negative for arthralgias, joint swelling and myalgias.   Skin: Negative for rash.   Neurological: Positive for dizziness and headaches. Negative for weakness and paresthesias.   Psychiatric/Behavioral: Negative for mood changes. The patient is not nervous/anxious.         OBJECTIVE:   /72   Pulse 62   Temp 98.4  F (36.9  C) (Oral)   Resp 16   Ht 1.537 m (5' 0.5\")   Wt 88.4 kg (194 lb 12.8 oz)   SpO2 96%   BMI 37.42 kg/m    Physical Exam  GENERAL: healthy, alert and no distress  EYES: Eyes grossly normal to inspection, PERRL and conjunctivae and sclerae normal  HENT: ear canals and TM's normal, nose and mouth without ulcers or lesions  NECK: no adenopathy, no asymmetry, masses, or scars and thyroid normal to palpation  RESP: lungs clear to auscultation - no rales, rhonchi or wheezes  CV: regular rate and rhythm, normal S1 S2, no S3 or S4, no murmur, click or " rub, no peripheral edema and peripheral pulses strong  ABDOMEN: soft, nontender, no hepatosplenomegaly, no masses and bowel sounds normal  MS: no gross musculoskeletal defects noted, no edema  SKIN: no suspicious lesions or rashes  NEURO: Normal strength and tone, mentation intact and speech normal  PSYCH: mentation appears normal, affect normal/bright    Diagnostic Test Results:  Labs reviewed in Epic    ASSESSMENT/PLAN:   Routine general medical examination at a health care facility  Routine health education discussed: calcium, diet, exercise, weight, safety.   - CBC with platelets; Future  - Iron and iron binding capacity; Future  - Vitamin B12; Future  - Folate; Future  - CBC with platelets  - Iron and iron binding capacity  - Vitamin B12  - Folate    Fear of flying  Reviewed PDMP and no concerns.  Going to [place] soon.  refilled  - ALPRAZolam (XANAX) 0.25 MG tablet; Take 1-2 tablets (0.25-0.5 mg) by mouth 2 times daily as needed for anxiety (anxiety due to flying)    Gastroesophageal reflux disease without esophagitis  Refill med  - LANsoprazole (PREVACID) 30 MG DR capsule; Take 1 capsule (30 mg) by mouth daily  - CBC with platelets; Future  - Iron and iron binding capacity; Future  - Vitamin B12; Future  - Folate; Future  - CBC with platelets  - Iron and iron binding capacity  - Vitamin B12  - Folate    Generalized anxiety disorder  Doing ok, continue medication   - FLUoxetine (PROZAC) 40 MG capsule; Take 2 capsules (80 mg) by mouth daily  - propranolol ER (INDERAL LA) 60 MG 24 hr capsule; Take 1 capsule (60 mg) by mouth daily  - CBC with platelets; Future  - Iron and iron binding capacity; Future  - Vitamin B12; Future  - Folate; Future  - CBC with platelets  - Iron and iron binding capacity  - Vitamin B12  - Folate    Moderate recurrent major depression (H)  Lacking in motivation and energy.  Could relate to depression or her chronic diseases.  Decrease morning buspirone to see if that will help with  energy.  Recommended she talk with GI about whether her disease is fully controlled  - buPROPion (WELLBUTRIN XL) 150 MG 24 hr tablet; Take 3 tablets (450 mg) by mouth every morning  - busPIRone HCl (BUSPAR) 30 MG tablet; Take 0.5 tablets (15 mg) by mouth every morning AND 1 tablet (30 mg) every evening.  - Vitamin B12; Future  - Vitamin B12    Psychophysiological insomnia  refill  - traZODone (DESYREL) 100 MG tablet; TAKE ONE TO FOUR TABLETS (100MG TO 400MG) BY MOUTH AT BEDTIME    Crohn's disease without complication, unspecified gastrointestinal tract location (H)  Reviewed GI note and labs.  Continue medication and follow-up with them.      Immunosuppressed status (H)  Discussed vaccination - will get at pharmacy as works this weekend  - CBC with platelets; Future  - Iron and iron binding capacity; Future  - CBC with platelets  - Iron and iron binding capacity    Impaired fasting glucose  Congratulated on weight loss.  Discussed glucose elevation.  Discuss this is a pre-diabetic condition.  Recommended eating healthily, exercising and maintaining a healthy weight to prevent the development of diabetes.  Recommended blood sugar checks at least yearly to monitor this.  Recommended dietary consultation which the patient declined.   - HEMOGLOBIN A1C; Future  - CBC with platelets; Future  - Vitamin B12; Future  - HEMOGLOBIN A1C  - CBC with platelets  - Vitamin B12    Anemia, unspecified type  Evaluation for cause.  If iron deficiency, will order EGD  - CBC with platelets; Future  - Iron and iron binding capacity; Future  - Vitamin B12; Future  - Folate; Future  - CBC with platelets  - Iron and iron binding capacity  - Vitamin B12  - Folate    Encounter for surveillance of contraceptive pills  refill  - drospirenone-ethinyl estradiol (KANA) 3-0.03 MG tablet; Take 1 tablet by mouth daily Take active pill daily    History of thyroid cancer  Follow-up with endo as scheduled  - TSH  - T4, free    Screen for colon  cancer    - Colonoscopy Screening  Referral; Future    Visit for screening mammogram    - MA SCREENING DIGITAL BILAT - Future  (s+30); Future    Postsurgical hypothyroidism    - TSH  - T4, free              COUNSELING:  Reviewed preventive health counseling, as reflected in patient instructions        She reports that she has never smoked. She has never used smokeless tobacco.          Jesenia Weinberg MD  [facility]

## 2023-07-02 ENCOUNTER — TELEPHONE (OUTPATIENT)
Dept: ENDOCRINOLOGY | Facility: CLINIC | Age: 53
End: 2023-07-02
Payer: COMMERCIAL

## 2023-07-02 NOTE — TELEPHONE ENCOUNTER
Latest Ref Rng 6/30/2023  2:41 PM   ENDO THYROID LABS-[facility]     TSH 0.30 - 4.20 uIU/mL 0.14 (L)    Free T3 2.3 - 4.2 pg/mL    FREE T4 0.90 - 1.70 ng/dL 1.96 (H)       Last visit 6/2022.  Please help schedule in open KERWIN slot 7/24/23.

## 2023-07-03 DIAGNOSIS — D64.9 ANEMIA, UNSPECIFIED TYPE: Primary | ICD-10-CM

## 2023-07-12 NOTE — TELEPHONE ENCOUNTER
Was not able to get a hold of patient and she did not return call,  spot is no longer available. Please advise.

## 2023-07-13 ENCOUNTER — VIRTUAL VISIT (OUTPATIENT)
Dept: ENDOCRINOLOGY | Facility: CLINIC | Age: 53
End: 2023-07-13
Payer: COMMERCIAL

## 2023-07-13 DIAGNOSIS — Z85.850 HISTORY OF THYROID CANCER: ICD-10-CM

## 2023-07-13 DIAGNOSIS — E89.0 S/P TOTAL THYROIDECTOMY: ICD-10-CM

## 2023-07-13 DIAGNOSIS — C73 THYROID CANCER (H): Primary | ICD-10-CM

## 2023-07-13 DIAGNOSIS — D09.9 PAPILLARY CARCINOMA IN SITU: ICD-10-CM

## 2023-07-13 DIAGNOSIS — E89.0 POSTSURGICAL HYPOTHYROIDISM: ICD-10-CM

## 2023-07-13 PROCEDURE — 99214 OFFICE O/P EST MOD 30 MIN: CPT | Mod: VID | Performed by: INTERNAL MEDICINE

## 2023-07-13 RX ORDER — LEVOTHYROXINE SODIUM 200 UG/1
TABLET ORAL
Qty: 90 TABLET | Refills: 1 | Status: SHIPPED | OUTPATIENT
Start: 2023-07-13 | End: 2023-12-11

## 2023-07-13 ASSESSMENT — ENCOUNTER SYMPTOMS
MUSCLE WEAKNESS: 0
DECREASED CONCENTRATION: 0
NERVOUS/ANXIOUS: 0
HEARTBURN: 0
BACK PAIN: 1
CONSTIPATION: 0
MUSCLE CRAMPS: 0
DEPRESSION: 1
BLOATING: 1
MYALGIAS: 1
JOINT SWELLING: 0
ABDOMINAL PAIN: 1
ARTHRALGIAS: 1
JAUNDICE: 0
VOMITING: 0
BLOOD IN STOOL: 0
RECTAL PAIN: 0
STIFFNESS: 0
NECK PAIN: 0
PANIC: 0
INSOMNIA: 0
DIARRHEA: 1
BOWEL INCONTINENCE: 1
NAUSEA: 1

## 2023-07-13 NOTE — PATIENT INSTRUCTIONS
[facility]  Dr Donis, Endocrinology Department    [facility]   [id number]  [place] [address]  Appointment [phone]  Fax: [phone]  [place]: Monday - Thursday      Please check the cost coverage and copay with insurance before recommended tests, services and medications (especially if new medications are prescribed).     If ordered, please get blood work done 1 week prior to your next appointment so they will be available to Dr. Donis at your visit.    Decrease dose of levothyroxine--  Take 200 mcg X 5 days a week, 100 mcg X 1 day a week and skip X1 day a week.  Ultrasound in 3 months.  Labs in 3 months.  Please make a lab appointment for blood work and follow up clinic appointment in 1 week after that to discuss results.     [facility] radiology scheduleing   [place]  [phone]   [facility] Radiology scheduling  [place]  [phone]     Please call and schedule the recommended test as discussed in clinic visit. These are the numbers to call.    Please note that schedule gets booked out fast and it is difficult to add on patients when schedule is full.  So it is highly advised that you make appointment ahead of time so that we can follow up on labs/imaging studies in timely manner. This will help us to serve you better.  Please call the clinic to make appropriate appointments.  Let us know if you have any questions or if you need any help with scheduling.

## 2023-07-13 NOTE — PROGRESS NOTES
"THIS IS A VIDEO VISIT:    Phone call visit/virtual visit encounter:    Name of patient: Meeta Rowell    Date of encounter: 7/13/2023    Time of start of video visit: 3:33    Video started: 3:40    Video ended: 3:47    Provider location: working from [place]/ [facility]    Patient location: patients home.    Mode of transmission: video/ Doximity    Verbal consent: obtained before starting visit. Pt is agreeable.      The patient has been notified of following:      \"This VIDEO visit will be conducted via a call between you and your physician/provider. We have found that certain health care needs can be provided without the need for a physical exam.  This service lets us provide the care you need with a short phone conversation.  If a prescription is necessary we can send it directly to your pharmacy.  If lab work is needed we can place an order for that and you can then stop by our lab to have the test done at a later time.     With new updates with corona virus patient might be billed as clinic visit.     If during the course of the call the physician/provider feels a telephone visit is not appropriate, you will not be charged for this service.\"      Past medical history, social history, family history, allergy and medications were reviewed and updated as appropriate.  Reviewed pertinent labs, notes, imaging studies personally.    Name: Meeta Rowell  F/u for postsurgical hypothyroidism and PTC. IRINA  and Nancy Garcia.  Last visit 2022.  HPI:  Meeta Rowell is a 52 year old female for above.   has a past medical history of Cancer (H) (12/11/2015), Crohn's disease (H), Depression, Depressive disorder, Endometriosis, GERD (gastroesophageal reflux disease), Personal history of other genital system and obstetric disorders(V13.29), Regional enteritis of small intestine (H) (01/01/2003), and Thyroid disease.      1. Thyroid cancer:  She initially presented for the evaluation of thyroid nodule, first " "noted in 2013.  She was previously seen for this by Dr. Berry and recalls FNA biopsy which was \"benign\".  Then  she noted enlarged neck lymph nodes.  Repeat FNA biopsy of the 3.0 cm right thyroid nodule showed follicular lesion of undetermined significance. She underwent total thyroidectomy 12/11/2015 after Frozen section confirmed a follicular lesion and a small papillary carcinoma of the thyroid .  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 No abnormal activity to suggest  metastatic thyroid cancer.  Follow-up neck ultrasound 7/2016: No enlarged or abnormal-appearing lymph nodes are appreciated in the right or left neck.  TG ( suppressed ) : 1.3 ( 1/27/16)  TG ( stimulated ) : 2.7 ( 2/3/16)  F/u : 8/2018- no mets identified.  TG appears suppressed as noted below.      2. Postsurgical hypothyroidism:  She was started on Levothyroxine 150 mcg after surgery, dose was increased to 175, then increased to  dose 200 mcg qd.  CUrrent  Dose: 200 mcg 6 days a week and skip X1 day a week.  On this dose X 2020.  No major concerns today.  Takes generic.  reports compliance.    Feeling fine  Energy is OK.  S/p menopause.     Chest pain, palpitations, tremors.  History of Crohn's disease and has on and off diarrhea.  + wt loss-- 15 lbs- intentional wt loss in last 3 months.    Family history is positive for mother with hypothyroidism.    No family history of thyroid cancer.    No personal history of radiation exposure to the head or neck.    She has a history of Chron's disease and has been treated with Remicade " for many years.    She is a nurse.      Patient feels well at this time and denies any tachycardia, palpitations, heat intolerance, tremor, insomnia, diarrhea, or unexplained weight loss.  Patient also denies  cold intolerance, constipation, or unexplained weight gain.     Wt Readings from Last 2 Encounters:   06/30/23 88.4 kg (194 lb 12.8 oz)   04/12/22 88 kg (193 lb 14.4 oz)       PMH/PSH:  Past Medical History:   Diagnosis Date     Cancer (H) 12/11/2015    thyroid     Crohn's disease (H)      Depression      Depressive disorder      Endometriosis      GERD (gastroesophageal reflux disease)      Personal history of other genital system and obstetric disorders(V13.29)      Regional enteritis of small intestine (H) 01/01/2003    ileal disease     Thyroid disease     hypo     Past Surgical History:   Procedure Laterality Date     BIOPSY       CHOLECYSTECTOMY       COLONOSCOPY       ENT SURGERY  12/01/2015    total thyroidectomy     THYROIDECTOMY N/A 12/11/2015    Procedure: THYROIDECTOMY;  Surgeon: Shari King MD;  Location:  OR     C NONSPECIFIC PROCEDURE  01/01/2000    s/p cholecystectomy     Family Hx:  Family History   Problem Relation Age of Onset     Hypertension Mother      Cervical Cancer Mother      Gastrointestinal Disease Mother         diverticulitis     Cardiovascular Mother      Hypothyroidism Mother      Coronary Artery Disease Mother      Depression Mother      Hypertension Father      Substance Abuse Father         And mother     Diabetes Paternal Grandmother      Cerebrovascular Disease Paternal Grandmother      Thyroid disease: Yes, mother         DM2: No         Autoimmune: DM1, SLE, RA, Vitiligo:  No    Social Hx:  Social History     Socioeconomic History     Marital status:      Spouse name: Not on file     Number of children: 2     Years of education: Not on file     Highest education level: Associate degree: occupational, technical, or vocational program   Occupational  History     Occupation: nurse     Employer: [employer]     Comment: cardiology nurse   Tobacco Use     Smoking status: Never     Smokeless tobacco: Never   Vaping Use     Vaping Use: Never used   Substance and Sexual Activity     Alcohol use: Yes     Comment: couple of times a year     Drug use: No     Sexual activity: Yes     Partners: Male     Birth control/protection: Pill, Male Surgical     Comment: Vasectomy   Other Topics Concern     Parent/sibling w/ CABG, MI or angioplasty before 65F 55M? No   Social History Narrative    Older son, Atul,  , with developmental delay     Social Determinants of Health     Financial Resource Strain: Low Risk  (2023)    Overall Financial Resource Strain (CARDIA)      Difficulty of Paying Living Expenses: Not very hard   Food Insecurity: No Food Insecurity (2023)    Hunger Vital Sign      Worried About Running Out of Food in the Last Year: Never true      Ran Out of Food in the Last Year: Never true   Transportation Needs: No Transportation Needs (2023)    PRAPARE - Transportation      Lack of Transportation (Medical): No      Lack of Transportation (Non-Medical): No   Physical Activity: Inactive (2023)    Exercise Vital Sign      Days of Exercise per Week: 0 days      Minutes of Exercise per Session: 0 min   Stress: Stress Concern Present (2023)    [place] [employer] - Occupational Stress Questionnaire      Feeling of Stress : To some extent   Social Connections: Socially Isolated (2023)    Social Connection and Isolation Panel [NHANES]      Frequency of Communication with Friends and Family: Once a week      Frequency of Social Gatherings with Friends and Family: Once a week      Attends Worship Services: Never      Active Member of Clubs or Organizations: No      Attends Club or Organization Meetings: Not on file      Marital Status:    Intimate Partner Violence: Not on file   Housing Stability:  Low Risk  (6/29/2023)    Housing Stability Vital Sign      Unable to Pay for Housing in the Last Year: No      Number of Places Lived in the Last Year: 1      Unstable Housing in the Last Year: No          MEDICATIONS:  has a current medication list which includes the following prescription(s): alprazolam, bupropion, buspirone hcl, drospirenone-ethinyl estradiol, fluoxetine, infliximab, lansoprazole, levothyroxine, propranolol er, trazodone, and vitamin d.    Review of Systems  10 point ROS neg other than the symptoms noted above in the HPI.    Physical Exam   VS: There were no vitals taken for this visit.  GENERAL: healthy, alert and no distress  EYES: Eyes grossly normal to inspection, conjunctivae and sclerae normal  ENT: no nose swelling, nasal discharge.  Thyroid: s/p thyroidectomy  RESP: no audible wheeze, cough, or visible cyanosis.  No visible retractions or increased work of breathing.  Able to speak fully in complete sentences.  ABDO: not evaluated.  EXTREMITIES: no hand tremors.  NEURO: Cranial nerves grossly intact, mentation intact and speech normal  SKIN: No apparent skin lesions, rash or edema seen   PSYCH: mentation appears normal, affect normal/bright, judgement and insight intact, normal speech and appearance well-groomed      LABS:  TG:  TG ( suppressed ) : 1.3 ( 1/27/16), TSH 7.83  TG ( stimulated ) : 2.7 ( 2/3/16), TSH 1.28    6/2016:  TSH: 1.28  JUAN: <0.4  TG: <0.1    12/2016:  TSH: 0.62  JUAN: <0.4  TG: <0.1    5/2020:  TSH: 0.07  JUAN: <0.4  TG: <0.1    12/2022:  JUAN: <0.4  TG: <0.1    TFTs:   Latest Ref Rng 6/30/2023  2:41 PM   ENDO THYROID LABS-[facility]     TSH 0.30 - 4.20 uIU/mL 0.14 (L)    Free T3 2.3 - 4.2 pg/mL    FREE T4 0.90 - 1.70 ng/dL 1.96 (H)       Surgical path:  FINAL DIAGNOSIS:  A, B, and C.  Specimens: Right thyroid lobe, prelaryngeal tissue, left thyroid  lobe.  Procedure: Total thyroidectomy.  Received: Fresh.  Specimen Integrity: Divided (thyroidectomy performed as  lobectomy and  completion thyroidectomy).  Tumor Focality: Four (4) foci of microcarcinomas .  Tumor Laterality: Right lobe (x2) and left lobe (x2).    Tumor Histologic Type (all tumors): Papillary microcarcinoma;  follicular variant, infiltrative.    Tumor Size:  Tumor #1 (right lobe): 0.8 cm.  Tumor #2 (right lobe): 0.6 cm.  Tumor #3 (left lobe): 0.2 cm.  Tumor #4 (left lobe): 0.25 cm.    Margins:  Tumor #1: Involved right anterolateral margin over an area of 0.4  cm.  Tumor #2: Tumor within 0.1 cm of the nearest (right anterolateral)  margin.  Tumor #3: Tumor within fraction of a mm of the left anterolateral  and medial margins.  Tumor #4: Tumor within fraction of a millimeter of the left  anterolateral margin.    Regional Lymph Nodes: No nodes submitted or found.    Pathologic Staging (pTNM): pT1a(m), pNX, pM not applicable.    Additional Pathologic Findings:  -Hyperplastic nodules (largest 2 cm wide; within right lobe).  -Single normal-appearing parathyroid gland identified within left  lobe, mid region.  -Prelaryngeal tissue (specimen C) comprises of benign fibrovascular  tissue without lymph nodes.  Prior biopsy site changes.      NM I-131 THYROID THERAPY  2/3/2016 9:38 AM     HISTORY: Thyroid cancer.     TECHNIQUE: Patient was given 100 mCi I-131orally and instructed in  home care.         IMPRESSION: Successful I-131 therapy administration.       NUCLEAR MEDICINE I-131 WHOLE BODY SCAN February 10, 2016 8:08 AM       HISTORY: Postprocedural hypothyroidism. Hypocalcemia. Malignant  neoplasm of thyroid gland, follow-up as     COMPARISON: None.     TECHNIQUE: Patient was given I-131 orally prior to the scan followed  by whole body scan.       DOSE: Seven days previously the patient received 106.6 mCi I-131.     FINDINGS: Physiologic activity noted. No abnormal activity to suggest  metastatic thyroid cancer.           IMPRESSION: No metastatic disease demonstrated.    Follow up Neck US 7/2016:  HEAD AND NECK SOFT TISSUE  ULTRASOUND  7/18/2016  2:44 PM       HISTORY: Postprocedural hypothyroidism, Malignant neoplasm of thyroid  gland       FINDINGS: Anterior neck ultrasound shows no residual thyroid tissue.  Multiple bilateral normal-appearing lymph nodes are present. No  enlarged or abnormal-appearing lymph nodes are evident in the right or  left neck.                                                                       IMPRESSION: No enlarged or abnormal-appearing lymph nodes are  appreciated in the right or left neck.    WBC I123:  NUCLEAR MEDICINE I-123 WHOLE BODY SCAN  August 16, 2018 12:06 PM      HISTORY: Postsurgical hypothyroidism. Thyroid cancer (H). Status post  total thyroidectomy.     COMPARISON: None.     TECHNIQUE: Patient was given I-123 orally followed by whole body scan.        DOSE: 2.2mci I123 in 8 capsules on 8/15/18 by LB     FINDINGS: Physiologic activity noted. No abnormal activity to suggest  metastatic thyroid cancer.                                                                       IMPRESSION: No metastatic disease demonstrated.        All pertinent notes, labs and images personally reviewed by me.     A/P  Ms.Heather SERGE Rowell is a 52 year old here for the evaluation of:    #1.  PTC:   FNA biopsy of the 3.0 cm right thyroid nodule showed follicular lesion of undetermined significance. 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 No abnormal activity to  suggest metastatic thyroid cancer.  Follow up US 6/2016- no mets  F/u I123 scan 8/2018- no mets  -- TG appear stable  Plan:   Discussed diagnosis, pathophysiology, management and treatment options of condition with pt.  Labs and neck US in 3 months.  Follow up after that.    #2. Postsurgical hypothyroidism:    CUrrent  Dose: 200 mcg X 6 days a week and skip X  1 day a week.  On this dose X 2020.  No major concerns today.  Generic.   Clinically looks euthyroid. Goal TSH <1.0  + intentional wt loss 15 lbs.  Plan:  Discussed diagnosis, pathophysiology, management and treatment options of condition with pt.  Based on 6/2023 labs- Decrease dose of levothyroxine--  Take 200 mcg X 5 days a week, 100 mcg X 1 day a week and skip X1 day a week. (7/13/2023)  Labs in 3 months.  Please make a lab appointment for blood work and follow up clinic appointment in 1 week after that to discuss results.    Discussed s/s of hypothyroidism and hyperthyroidism to watch for.  The patient indicates understanding of these issues and agrees with the plan.      Follow-up:  As noted in AVS    Alice Donis MD  Endocrinology   [facility]/[place]    Addendum to above note and clinic visit:    Labs reviewed.    See result note/telephone encounter.              Answers for HPI/ROS submitted by the patient on 7/13/2023  General Symptoms: No  Skin Symptoms: No  HENT Symptoms: No  EYE SYMPTOMS: No  HEART SYMPTOMS: No  LUNG SYMPTOMS: No  INTESTINAL SYMPTOMS: Yes  URINARY SYMPTOMS: No  GYNECOLOGIC SYMPTOMS: No  BREAST SYMPTOMS: No  SKELETAL SYMPTOMS: Yes  BLOOD SYMPTOMS: No  NERVOUS SYSTEM SYMPTOMS: No  MENTAL HEALTH SYMPTOMS: Yes  Heart burn or indigestion: No  Nausea: Yes  Vomiting: No  Abdominal pain: Yes  Bloating: Yes  Constipation: No  Diarrhea: Yes  Blood in stool: No  Black stools: No  Rectal or Anal pain: No  Fecal incontinence: Yes  Yellowing of skin or eyes: No  Vomit with blood: No  Change in stools: No  Back pain: Yes  Muscle  aches: Yes  Neck pain: No  Swollen joints: No  Joint pain: Yes  Bone pain: No  Muscle cramps: No  Muscle weakness: No  Joint stiffness: No  Bone fracture: No  Nervous or Anxious: No  Depression: Yes  Trouble sleeping: No  Trouble thinking or concentrating: No  Mood changes: Yes  Panic attacks: No

## 2023-07-13 NOTE — LETTER
"    7/13/2023         RE: Meeta Rowell  [address] [place] [address]        Dear Colleague,    Thank you for referring your patient, Meeta Rowell, to the [facility]. Please see a copy of my visit note below.    THIS IS A VIDEO VISIT:    Phone call visit/virtual visit encounter:    Name of patient: Meeta Rowell    Date of encounter: 7/13/2023    Time of start of video visit: 3:33    Video started: 3:40    Video ended: 3:47    Provider location: working from home/ [facility]    Patient location: patients home.    Mode of transmission: video/ Doximity    Verbal consent: obtained before starting visit. Pt is agreeable.      The patient has been notified of following:      \"This VIDEO visit will be conducted via a call between you and your physician/provider. We have found that certain health care needs can be provided without the need for a physical exam.  This service lets us provide the care you need with a short phone conversation.  If a prescription is necessary we can send it directly to your pharmacy.  If lab work is needed we can place an order for that and you can then stop by our lab to have the test done at a later time.     With new updates with corona virus patient might be billed as clinic visit.     If during the course of the call the physician/provider feels a telephone visit is not appropriate, you will not be charged for this service.\"      Past medical history, social history, family history, allergy and medications were reviewed and updated as appropriate.  Reviewed pertinent labs, notes, imaging studies personally.    Name: Meeta Rowell  F/u for postsurgical hypothyroidism and PTC. IRINA  and Nancy Garcia.  Last visit 2022.  HPI:  Meeta Rowell is a 52 year old female for above.   has a past medical history of Cancer (H) (12/11/2015), Crohn's disease (H), Depression, Depressive disorder, Endometriosis, GERD (gastroesophageal " "reflux disease), Personal history of other genital system and obstetric disorders(V13.29), Regional enteritis of small intestine (H) (01/01/2003), and Thyroid disease.      1. Thyroid cancer:  She initially presented for the evaluation of thyroid nodule, first noted in 2013.  She was previously seen for this by Dr. Berry and recalls FNA biopsy which was \"benign\".  Then  she noted enlarged neck lymph nodes.  Repeat FNA biopsy of the 3.0 cm right thyroid nodule showed follicular lesion of undetermined significance. She underwent total thyroidectomy 12/11/2015 after Frozen section confirmed a follicular lesion and a small papillary carcinoma of the thyroid .  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 No abnormal activity to suggest  metastatic thyroid cancer.  Follow-up neck ultrasound 7/2016: No enlarged or abnormal-appearing lymph nodes are appreciated in the right or left neck.  TG ( suppressed ) : 1.3 ( 1/27/16)  TG ( stimulated ) : 2.7 ( 2/3/16)  F/u : 8/2018- no mets identified.  TG appears suppressed as noted below.      2. Postsurgical hypothyroidism:  She was started on Levothyroxine 150 mcg after surgery, dose was increased to 175, then increased to  dose 200 mcg qd.  CUrrent  Dose: 200 mcg 6 days a week and skip X1 day a week.  On this dose X 2020.  No major concerns today.  Takes generic.  reports compliance.    Feeling fine  Energy is OK.  S/p menopause.     Chest pain, palpitations, tremors.  History of Crohn's disease and has on and off diarrhea.  + wt loss-- 15 lbs- intentional wt loss in " last 3 months.    Family history is positive for mother with hypothyroidism.    No family history of thyroid cancer.    No personal history of radiation exposure to the head or neck.    She has a history of Chron's disease and has been treated with Remicade for many years.    She is a nurse.      Patient feels well at this time and denies any tachycardia, palpitations, heat intolerance, tremor, insomnia, diarrhea, or unexplained weight loss.  Patient also denies  cold intolerance, constipation, or unexplained weight gain.     Wt Readings from Last 2 Encounters:   06/30/23 88.4 kg (194 lb 12.8 oz)   04/12/22 88 kg (193 lb 14.4 oz)       PMH/PSH:  Past Medical History:   Diagnosis Date     Cancer (H) 12/11/2015    thyroid     Crohn's disease (H)      Depression      Depressive disorder      Endometriosis      GERD (gastroesophageal reflux disease)      Personal history of other genital system and obstetric disorders(V13.29)      Regional enteritis of small intestine (H) 01/01/2003    ileal disease     Thyroid disease     hypo     Past Surgical History:   Procedure Laterality Date     BIOPSY       CHOLECYSTECTOMY       COLONOSCOPY       ENT SURGERY  12/01/2015    total thyroidectomy     THYROIDECTOMY N/A 12/11/2015    Procedure: THYROIDECTOMY;  Surgeon: Shari King MD;  Location:  OR     [facility] NONSPECIFIC PROCEDURE  01/01/2000    s/p cholecystectomy     Family Hx:  Family History   Problem Relation Age of Onset     Hypertension Mother      Cervical Cancer Mother      Gastrointestinal Disease Mother         diverticulitis     Cardiovascular Mother      Hypothyroidism Mother      Coronary Artery Disease Mother      Depression Mother      Hypertension Father      Substance Abuse Father         And mother     Diabetes Paternal Grandmother      Cerebrovascular Disease Paternal Grandmother      Thyroid disease: Yes, mother         DM2: No         Autoimmune: DM1, SLE, RA, Vitiligo:  No    Social Hx:  Social History      Socioeconomic History     Marital status:      Spouse name: Not on file     Number of children: 2     Years of education: Not on file     Highest education level: Associate degree: occupational, technical, or vocational program   Occupational History     Occupation: nurse     Employer: [employer]     Comment: cardiology nurse   Tobacco Use     Smoking status: Never     Smokeless tobacco: Never   Vaping Use     Vaping Use: Never used   Substance and Sexual Activity     Alcohol use: Yes     Comment: couple of times a year     Drug use: No     Sexual activity: Yes     Partners: Male     Birth control/protection: Pill, Male Surgical     Comment: Vasectomy   Other Topics Concern     Parent/sibling w/ CABG, MI or angioplasty before 65F 55M? No   Social History Narrative    Older son, Atul,  , with developmental delay     Social Determinants of Health     Financial Resource Strain: Low Risk  (2023)    Overall Financial Resource Strain (CARDIA)      Difficulty of Paying Living Expenses: Not very hard   Food Insecurity: No Food Insecurity (2023)    Hunger Vital Sign      Worried About Running Out of Food in the Last Year: Never true      Ran Out of Food in the Last Year: Never true   Transportation Needs: No Transportation Needs (2023)    PRAPARE - Transportation      Lack of Transportation (Medical): No      Lack of Transportation (Non-Medical): No   Physical Activity: Inactive (2023)    Exercise Vital Sign      Days of Exercise per Week: 0 days      Minutes of Exercise per Session: 0 min   Stress: Stress Concern Present (2023)    [place] [employer] - Occupational Stress Questionnaire      Feeling of Stress : To some extent   Social Connections: Socially Isolated (2023)    Social Connection and Isolation Panel [NHANES]      Frequency of Communication with Friends and Family: Once a week      Frequency of Social Gatherings with Friends and  Family: Once a week      Attends Jew Services: Never      Active Member of Clubs or Organizations: No      Attends Club or Organization Meetings: Not on file      Marital Status:    Intimate Partner Violence: Not on file   Housing Stability: Low Risk  (6/29/2023)    Housing Stability Vital Sign      Unable to Pay for Housing in the Last Year: No      Number of Places Lived in the Last Year: 1      Unstable Housing in the Last Year: No          MEDICATIONS:  has a current medication list which includes the following prescription(s): alprazolam, bupropion, buspirone hcl, drospirenone-ethinyl estradiol, fluoxetine, infliximab, lansoprazole, levothyroxine, propranolol er, trazodone, and vitamin d.    Review of Systems  10 point ROS neg other than the symptoms noted above in the HPI.    Physical Exam   VS: There were no vitals taken for this visit.  GENERAL: healthy, alert and no distress  EYES: Eyes grossly normal to inspection, conjunctivae and sclerae normal  ENT: no nose swelling, nasal discharge.  Thyroid: s/p thyroidectomy  RESP: no audible wheeze, cough, or visible cyanosis.  No visible retractions or increased work of breathing.  Able to speak fully in complete sentences.  ABDO: not evaluated.  EXTREMITIES: no hand tremors.  NEURO: Cranial nerves grossly intact, mentation intact and speech normal  SKIN: No apparent skin lesions, rash or edema seen   PSYCH: mentation appears normal, affect normal/bright, judgement and insight intact, normal speech and appearance well-groomed      LABS:  TG:  TG ( suppressed ) : 1.3 ( 1/27/16), TSH 7.83  TG ( stimulated ) : 2.7 ( 2/3/16), TSH 1.28    6/2016:  TSH: 1.28  JUAN: <0.4  TG: <0.1    12/2016:  TSH: 0.62  JUAN: <0.4  TG: <0.1    5/2020:  TSH: 0.07  JUAN: <0.4  TG: <0.1    12/2022:  JUAN: <0.4  TG: <0.1    TFTs:   Latest Ref Rng 6/30/2023  2:41 PM   ENDO THYROID LABS-[facility]     TSH 0.30 - 4.20 uIU/mL 0.14 (L)    Free T3 2.3 - 4.2 pg/mL    FREE T4 0.90 - 1.70 ng/dL  1.96 (H)       Surgical path:  FINAL DIAGNOSIS:  A, B, and C.  Specimens: Right thyroid lobe, prelaryngeal tissue, left thyroid  lobe.  Procedure: Total thyroidectomy.  Received: Fresh.  Specimen Integrity: Divided (thyroidectomy performed as  lobectomy and completion thyroidectomy).  Tumor Focality: Four (4) foci of microcarcinomas .  Tumor Laterality: Right lobe (x2) and left lobe (x2).    Tumor Histologic Type (all tumors): Papillary microcarcinoma;  follicular variant, infiltrative.    Tumor Size:  Tumor #1 (right lobe): 0.8 cm.  Tumor #2 (right lobe): 0.6 cm.  Tumor #3 (left lobe): 0.2 cm.  Tumor #4 (left lobe): 0.25 cm.    Margins:  Tumor #1: Involved right anterolateral margin over an area of 0.4  cm.  Tumor #2: Tumor within 0.1 cm of the nearest (right anterolateral)  margin.  Tumor #3: Tumor within fraction of a mm of the left anterolateral  and medial margins.  Tumor #4: Tumor within fraction of a millimeter of the left  anterolateral margin.    Regional Lymph Nodes: No nodes submitted or found.    Pathologic Staging (pTNM): pT1a(m), pNX, pM not applicable.    Additional Pathologic Findings:  -Hyperplastic nodules (largest 2 cm wide; within right lobe).  -Single normal-appearing parathyroid gland identified within left  lobe, mid region.  -Prelaryngeal tissue (specimen C) comprises of benign fibrovascular  tissue without lymph nodes.  Prior biopsy site changes.      NM I-131 THYROID THERAPY  2/3/2016 9:38 AM     HISTORY: Thyroid cancer.     TECHNIQUE: Patient was given 100 mCi I-131orally and instructed in  home care.         IMPRESSION: Successful I-131 therapy administration.       NUCLEAR MEDICINE I-131 WHOLE BODY SCAN February 10, 2016 8:08 AM       HISTORY: Postprocedural hypothyroidism. Hypocalcemia. Malignant  neoplasm of thyroid gland, follow-up as     COMPARISON: None.     TECHNIQUE: Patient was given I-131 orally prior to the scan followed  by whole body scan.       DOSE: Seven days previously  the patient received 106.6 mCi I-131.     FINDINGS: Physiologic activity noted. No abnormal activity to suggest  metastatic thyroid cancer.           IMPRESSION: No metastatic disease demonstrated.    Follow up Neck US 7/2016:  HEAD AND NECK SOFT TISSUE ULTRASOUND  7/18/2016  2:44 PM       HISTORY: Postprocedural hypothyroidism, Malignant neoplasm of thyroid  gland       FINDINGS: Anterior neck ultrasound shows no residual thyroid tissue.  Multiple bilateral normal-appearing lymph nodes are present. No  enlarged or abnormal-appearing lymph nodes are evident in the right or  left neck.                                                                       IMPRESSION: No enlarged or abnormal-appearing lymph nodes are  appreciated in the right or left neck.    WBC I123:  NUCLEAR MEDICINE I-123 WHOLE BODY SCAN  August 16, 2018 12:06 PM      HISTORY: Postsurgical hypothyroidism. Thyroid cancer (H). Status post  total thyroidectomy.     COMPARISON: None.     TECHNIQUE: Patient was given I-123 orally followed by whole body scan.        DOSE: 2.2mci I123 in 8 capsules on 8/15/18 by LB     FINDINGS: Physiologic activity noted. No abnormal activity to suggest  metastatic thyroid cancer.                                                                       IMPRESSION: No metastatic disease demonstrated.        All pertinent notes, labs and images personally reviewed by me.     A/P  Ms.Heather SERGE Rowell is a 52 year old here for the evaluation of:    #1.  PTC:   FNA biopsy of the 3.0 cm right thyroid nodule showed follicular lesion of undetermined significance. 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 No abnormal activity to suggest metastatic thyroid cancer.  Follow up US 6/2016- no mets  F/u I123 scan 8/2018- no mets  -- TG appear stable  Plan:   Discussed diagnosis, pathophysiology, management and treatment options of condition with pt.  Labs and neck US in 3 months.  Follow up after that.    #2. Postsurgical hypothyroidism:    CUrrent  Dose: 200 mcg X 6 days a week and skip X  1 day a week.  On this dose X 2020.  No major concerns today.  Generic.   Clinically looks euthyroid. Goal TSH <1.0  + intentional wt loss 15 lbs.  Plan:  Discussed diagnosis, pathophysiology, management and treatment options of condition with pt.  Based on 6/2023 labs- Decrease dose of levothyroxine--  Take 200 mcg X 5 days a week, 100 mcg X 1 day a week and skip X1 day a week. (7/13/2023)  Labs in 3 months.  Please make a lab appointment for blood work and follow up clinic appointment in 1 week after that to discuss results.    Discussed s/s of hypothyroidism and hyperthyroidism to watch for.  The patient indicates understanding of these issues and agrees with the plan.      Follow-up:  As noted in AVS    Alice Donis MD  Endocrinology   [facility]    Addendum to above note and clinic visit:    Labs reviewed.    See result note/telephone encounter.              Answers for HPI/ROS submitted by the patient on 7/13/2023  General Symptoms: No  Skin Symptoms: No  HENT Symptoms: No  EYE SYMPTOMS: No  HEART SYMPTOMS: No  LUNG SYMPTOMS: No  INTESTINAL SYMPTOMS: Yes  URINARY SYMPTOMS: No  GYNECOLOGIC SYMPTOMS: No  BREAST SYMPTOMS: No  SKELETAL SYMPTOMS: Yes  BLOOD SYMPTOMS: No  NERVOUS SYSTEM SYMPTOMS: No  MENTAL HEALTH SYMPTOMS: Yes  Heart burn or indigestion: No  Nausea: Yes  Vomiting: No  Abdominal  pain: Yes  Bloating: Yes  Constipation: No  Diarrhea: Yes  Blood in stool: No  Black stools: No  Rectal or Anal pain: No  Fecal incontinence: Yes  Yellowing of skin or eyes: No  Vomit with blood: No  Change in stools: No  Back pain: Yes  Muscle aches: Yes  Neck pain: No  Swollen joints: No  Joint pain: Yes  Bone pain: No  Muscle cramps: No  Muscle weakness: No  Joint stiffness: No  Bone fracture: No  Nervous or Anxious: No  Depression: Yes  Trouble sleeping: No  Trouble thinking or concentrating: No  Mood changes: Yes  Panic attacks: No          Again, thank you for allowing me to participate in the care of your patient.        Sincerely,        Alice Donis MD

## 2023-08-15 ENCOUNTER — HOSPITAL ENCOUNTER (OUTPATIENT)
Dept: MAMMOGRAPHY | Facility: CLINIC | Age: 53
Discharge: HOME | End: 2023-08-15
Attending: INTERNAL MEDICINE | Admitting: INTERNAL MEDICINE
Payer: COMMERCIAL

## 2023-08-15 DIAGNOSIS — Z12.31 VISIT FOR SCREENING MAMMOGRAM: ICD-10-CM

## 2023-08-15 PROCEDURE — 77067 SCR MAMMO BI INCL CAD: CPT

## 2023-08-29 ENCOUNTER — TELEPHONE (OUTPATIENT)
Dept: GASTROENTEROLOGY | Facility: CLINIC | Age: 53
End: 2023-08-29
Payer: COMMERCIAL

## 2023-08-29 NOTE — TELEPHONE ENCOUNTER
"Endoscopy Scheduling Screen  No  Have you had a positive Covid test in the last 14 days?  No    Are you active on MyChart?   Yes    What insurance is in the chart?  BC/BS: Schedule in [facility] unless patient meets exclusion criteria.     Ordering/Referring Provider: JEYSON HA   (If ordering provider performs procedure, schedule with ordering provider unless otherwise instructed. )    BMI: Estimated body mass index is 37.42 kg/m  as calculated from the following:    Height as of 6/30/23: 1.537 m (5' 0.5\").    Weight as of 6/30/23: 88.4 kg (194 lb 12.8 oz).       Sedation Ordered  moderate sedation.   If patient BMI > 50 do not schedule in ASC.    Are you taking any prescription medications for pain?   No    Are you taking methadone or Suboxone?  No      Do you have a history of malignant hyperthermia or adverse reaction to anesthesia?  No      (Females) Are you currently pregnant?   No       Have you been diagnosed or told you have pulmonary hypertension?   No      Do you have an LVAD?  No      Have you been told you have moderate to severe sleep apnea?  No      Have you been told you have COPD, asthma, or any other lung disease?  No      Do you have any heart conditions?  No       Have you ever had or are you awaiting a heart or lung transplant?   No      Have you had a stroke or transient ischemic attack (TIA aka \"mini stroke\" in the last 6 months?   No      Have you been diagnosed with or been told you have cirrhosis of the liver?   No      Are you currently on dialysis?   No      Do you need assistance transferring?   No    BMI: Estimated body mass index is 37.42 kg/m  as calculated from the following:    Height as of 6/30/23: 1.537 m (5' 0.5\").    Weight as of 6/30/23: 88.4 kg (194 lb 12.8 oz).     Is patients BMI > 40 and scheduling location [facility]?  No      Do you take the medication Phentermine, Ozempic or Wegovy?  No      Do you take the medication Naltrexone?  No      Do you take blood " thinners?  No      Prep   Are you currently on dialysis or do you have chronic kidney disease?  No      Do you have a diagnosis of diabetes?  No      Do you have a diagnosis of cystic fibrosis (CF)?  No      On a regular basis do you go 3 -5 days between bowel movements?  No      BMI > 40?  No    Preferred Pharmacy:    [facility] [id number] [place] [address] [address]  Phone: [phone] Fax: [phone]    THE PATIENT PREFERS TO SCHEDULE THE PROCEDURE WITH HER PROVIDER AT [facility], SHE WILL CONTACT THEM. SHE WAS UNAWARE THAT WE ARE UNABLE TO SCHEDULE FOR [facility].

## 2023-09-14 ENCOUNTER — MYC MEDICAL ADVICE (OUTPATIENT)
Dept: PEDIATRICS | Facility: CLINIC | Age: 53
End: 2023-09-14
Payer: COMMERCIAL

## 2023-09-14 ENCOUNTER — MYC REFILL (OUTPATIENT)
Dept: PEDIATRICS | Facility: CLINIC | Age: 53
End: 2023-09-14
Payer: COMMERCIAL

## 2023-09-14 DIAGNOSIS — F33.1 MODERATE RECURRENT MAJOR DEPRESSION (H): ICD-10-CM

## 2023-09-14 DIAGNOSIS — F40.243 FEAR OF FLYING: ICD-10-CM

## 2023-09-14 RX ORDER — BUSPIRONE HYDROCHLORIDE 30 MG/1
30 TABLET ORAL 2 TIMES DAILY
Qty: 60 TABLET | Refills: 2 | OUTPATIENT
Start: 2023-09-14

## 2023-09-14 RX ORDER — ALPRAZOLAM 0.25 MG
.25-.5 TABLET ORAL 2 TIMES DAILY PRN
Qty: 8 TABLET | Refills: 0 | Status: SHIPPED | OUTPATIENT
Start: 2023-09-14 | End: 2024-06-14

## 2023-09-14 NOTE — TELEPHONE ENCOUNTER
Routing refill request to provider for review/approval because:  Drug not on the [facility] refill protocol     Preethi FLORENTINO RN   [facility]

## 2023-10-05 ENCOUNTER — TRANSFERRED RECORDS (OUTPATIENT)
Dept: HEALTH INFORMATION MANAGEMENT | Facility: CLINIC | Age: 53
End: 2023-10-05
Payer: COMMERCIAL

## 2023-10-05 LAB
ALT SERPL-CCNC: 8 IU/L (ref 0–32)
AST SERPL-CCNC: 14 IU/L (ref 0–40)

## 2023-11-08 ENCOUNTER — TRANSFERRED RECORDS (OUTPATIENT)
Dept: HEALTH INFORMATION MANAGEMENT | Facility: CLINIC | Age: 53
End: 2023-11-08
Payer: COMMERCIAL

## 2023-12-07 ENCOUNTER — MYC REFILL (OUTPATIENT)
Dept: ENDOCRINOLOGY | Facility: CLINIC | Age: 53
End: 2023-12-07
Payer: COMMERCIAL

## 2023-12-07 ENCOUNTER — MYC MEDICAL ADVICE (OUTPATIENT)
Dept: PEDIATRICS | Facility: CLINIC | Age: 53
End: 2023-12-07
Payer: COMMERCIAL

## 2023-12-07 DIAGNOSIS — F33.1 MODERATE RECURRENT MAJOR DEPRESSION (H): ICD-10-CM

## 2023-12-07 DIAGNOSIS — C73 THYROID CANCER (H): ICD-10-CM

## 2023-12-07 DIAGNOSIS — E89.0 S/P TOTAL THYROIDECTOMY: ICD-10-CM

## 2023-12-07 RX ORDER — BUSPIRONE HYDROCHLORIDE 30 MG/1
30 TABLET ORAL 2 TIMES DAILY
Qty: 180 TABLET | Refills: 1 | Status: SHIPPED | OUTPATIENT
Start: 2023-12-07 | End: 2024-06-14

## 2023-12-07 RX ORDER — LEVOTHYROXINE SODIUM 200 UG/1
TABLET ORAL
Qty: 90 TABLET | Refills: 1 | Status: CANCELLED | OUTPATIENT
Start: 2023-12-07

## 2023-12-07 NOTE — TELEPHONE ENCOUNTER
Patient requesting return to previous dose of medication due to possible side effect of tiredness. Per last OV w/ PCP 6/30/23 notes: Moderate recurrent major depression (H)  Lacking in motivation and energy.  Could relate to depression or her chronic diseases.  Decrease morning buspirone to see if that will help with energy.  Recommended she talk with GI about whether her disease is fully controlled  - buPROPion (WELLBUTRIN XL) 150 MG 24 hr tablet; Take 3 tablets (450 mg) by mouth every morning  - busPIRone HCl (BUSPAR) 30 MG tablet; Take 0.5 tablets (15 mg) by mouth every morning AND 1 tablet (30 mg) every evening.    Please review and advise if visit needed to discuss.     Miguel GARCIA RN 12/7/2023 at 2:22 PM

## 2023-12-08 ENCOUNTER — LAB (OUTPATIENT)
Dept: LAB | Facility: CLINIC | Age: 53
End: 2023-12-08
Payer: COMMERCIAL

## 2023-12-08 DIAGNOSIS — E89.0 POSTSURGICAL HYPOTHYROIDISM: ICD-10-CM

## 2023-12-08 DIAGNOSIS — C73 THYROID CANCER (H): ICD-10-CM

## 2023-12-08 DIAGNOSIS — E89.0 S/P TOTAL THYROIDECTOMY: ICD-10-CM

## 2023-12-08 LAB
T4 FREE SERPL-MCNC: 1.06 NG/DL (ref 0.9–1.7)
TSH SERPL DL<=0.005 MIU/L-ACNC: 23.4 UIU/ML (ref 0.3–4.2)

## 2023-12-08 PROCEDURE — 86800 THYROGLOBULIN ANTIBODY: CPT | Mod: 90

## 2023-12-08 PROCEDURE — 84443 ASSAY THYROID STIM HORMONE: CPT

## 2023-12-08 PROCEDURE — 99000 SPECIMEN HANDLING OFFICE-LAB: CPT

## 2023-12-08 PROCEDURE — 36415 COLL VENOUS BLD VENIPUNCTURE: CPT

## 2023-12-08 PROCEDURE — 84439 ASSAY OF FREE THYROXINE: CPT

## 2023-12-08 PROCEDURE — 84432 ASSAY OF THYROGLOBULIN: CPT | Mod: 90

## 2023-12-08 NOTE — TELEPHONE ENCOUNTER
"Pt needs labs now and follow up first available     Requested Prescriptions   Pending Prescriptions Disp Refills    levothyroxine (SYNTHROID/LEVOTHROID) 200 MCG tablet 90 tablet 1     Sig: Take 200 mcg X 5 days a week, 100 mcg X 1 day a week and skip X1 day a week.       Thyroid Protocol Failed - 12/7/2023  7:06 PM        Failed - Normal TSH on file in past 12 months     Recent Labs   Lab Test 06/30/23  1441   TSH 0.14*              Passed - Patient is 12 years or older        Passed - Recent (12 mo) or future (30 days) visit within the authorizing provider's specialty     Patient has had an office visit with the authorizing provider or a provider within the authorizing providers department within the previous 12 mos or has a future within next 30 days. See \"Patient Info\" tab in inbasket, or \"Choose Columns\" in Meds & Orders section of the refill encounter.              Passed - Medication is active on med list        Passed - No active pregnancy on record     If patient is pregnant or has had a positive pregnancy test, please check TSH.          Passed - No positive pregnancy test in past 12 months     If patient is pregnant or has had a positive pregnancy test, please check TSH.               "

## 2023-12-11 ENCOUNTER — TELEPHONE (OUTPATIENT)
Dept: ENDOCRINOLOGY | Facility: CLINIC | Age: 53
End: 2023-12-11
Payer: COMMERCIAL

## 2023-12-11 DIAGNOSIS — C73 THYROID CANCER (H): ICD-10-CM

## 2023-12-11 DIAGNOSIS — E89.0 S/P TOTAL THYROIDECTOMY: ICD-10-CM

## 2023-12-11 RX ORDER — LEVOTHYROXINE SODIUM 200 UG/1
TABLET ORAL
Qty: 90 TABLET | Refills: 1 | Status: SHIPPED | OUTPATIENT
Start: 2023-12-11

## 2023-12-11 NOTE — TELEPHONE ENCOUNTER
"Requested Prescriptions   Pending Prescriptions Disp Refills    levothyroxine (SYNTHROID/LEVOTHROID) 200 MCG tablet 90 tablet 1     Sig: Take 200 mcg X 5 days a week, 100 mcg X 1 day a week and skip X1 day a week.       Thyroid Protocol Failed - 12/7/2023  7:06 PM        Failed - Normal TSH on file in past 12 months     Recent Labs   Lab Test 12/08/23  1429   TSH 23.40*              Passed - Patient is 12 years or older        Passed - Recent (12 mo) or future (30 days) visit within the authorizing provider's specialty     Patient has had an office visit with the authorizing provider or a provider within the authorizing providers department within the previous 12 mos or has a future within next 30 days. See \"Patient Info\" tab in inbasket, or \"Choose Columns\" in Meds & Orders section of the refill encounter.              Passed - Medication is active on med list        Passed - No active pregnancy on record     If patient is pregnant or has had a positive pregnancy test, please check TSH.          Passed - No positive pregnancy test in past 12 months     If patient is pregnant or has had a positive pregnancy test, please check TSH.               "

## 2023-12-11 NOTE — TELEPHONE ENCOUNTER
H/o thyroid cacner.   current dose-- Take 200 mcg X 5 days a week, 100 mcg X 1 day a week and skip X1 day a week. (7/13/2023)     Based on 12/2023 labs- recommend to increase dose-- take  200 mcg 6 days a week and skip X1 day a week.     Labs few days prior to next appointment in 1/2024     Latest Ref Rng 6/30/2023  2:41 PM 12/8/2023  2:29 PM   ENDO THYROID LABS-[facility]      TSH 0.30 - 4.20 uIU/mL 0.14 (L)  23.40 (H)    Free T3 2.3 - 4.2 pg/mL     FREE T4 0.90 - 1.70 ng/dL 1.96 (H)  1.06

## 2023-12-14 ENCOUNTER — TRANSFERRED RECORDS (OUTPATIENT)
Dept: HEALTH INFORMATION MANAGEMENT | Facility: CLINIC | Age: 53
End: 2023-12-14
Payer: COMMERCIAL

## 2023-12-14 LAB — SCANNED LAB RESULT: NORMAL

## 2024-01-02 ENCOUNTER — MYC MEDICAL ADVICE (OUTPATIENT)
Dept: ENDOCRINOLOGY | Facility: CLINIC | Age: 54
End: 2024-01-02
Payer: COMMERCIAL

## 2024-01-09 ENCOUNTER — VIRTUAL VISIT (OUTPATIENT)
Dept: ENDOCRINOLOGY | Facility: CLINIC | Age: 54
End: 2024-01-09
Payer: COMMERCIAL

## 2024-01-09 DIAGNOSIS — D09.9 PAPILLARY CARCINOMA IN SITU: Primary | ICD-10-CM

## 2024-01-09 DIAGNOSIS — Z85.850 HISTORY OF THYROID CANCER: ICD-10-CM

## 2024-01-09 DIAGNOSIS — E89.0 POSTSURGICAL HYPOTHYROIDISM: ICD-10-CM

## 2024-01-09 PROCEDURE — 99214 OFFICE O/P EST MOD 30 MIN: CPT | Mod: 95 | Performed by: INTERNAL MEDICINE

## 2024-01-09 ASSESSMENT — PAIN SCALES - GENERAL: PAINLEVEL: NO PAIN (0)

## 2024-01-09 NOTE — NURSING NOTE
Is the patient currently in the state of [place]? YES    Visit mode:VIDEO    If the visit is dropped, the patient can be reconnected by: VIDEO VISIT: Text to cell phone:   Telephone Information:   Mobile [phone]       Will anyone else be joining the visit? NO  (If patient encounters technical issues they should call [phone]    How would you like to obtain your AVS? MyChart    Are changes needed to the allergy or medication list? No    Reason for visit: RECHECK Shelby Kocher VVF

## 2024-01-09 NOTE — LETTER
"    1/9/2024         RE: Meeta Rowell  [address] [place] [address]        Dear Colleague,    Thank you for referring your patient, Meeta Rowell, to the [facility] [place]. Please see a copy of my visit note below.    THIS IS A VIDEO VISIT:    Phone call visit/virtual visit encounter:    Name of patient: Meeta Rowell    Date of encounter: 1/9/2024    Time of start of video visit: 1:30    Video started: 1:40    Video ended: 1:50    Provider location: working from home/ [facility]    Patient location: patients home.    Mode of transmission: video/ Doximity    Verbal consent: obtained before starting visit. Pt is agreeable.      The patient has been notified of following:      \"This VIDEO visit will be conducted via a call between you and your physician/provider. We have found that certain health care needs can be provided without the need for a physical exam.  This service lets us provide the care you need with a short phone conversation.  If a prescription is necessary we can send it directly to your pharmacy.  If lab work is needed we can place an order for that and you can then stop by our lab to have the test done at a later time.     With new updates with corona virus patient might be billed as clinic visit.     If during the course of the call the physician/provider feels a telephone visit is not appropriate, you will not be charged for this service.\"      Past medical history, social history, family history, allergy and medications were reviewed and updated as appropriate.  Reviewed pertinent labs, notes, imaging studies personally.    Name: Meeta Rowell  F/u for postsurgical hypothyroidism and PTC. IRINA  and Nancy Garcia.  Last visit 2022.  HPI:  Meeta Rowell is a 53 year old female for above.   has a past medical history of Cancer (H) (12/11/2015), Crohn's disease (H), Depression, Depressive disorder, Endometriosis, GERD (gastroesophageal reflux " "disease), Personal history of other genital system and obstetric disorders(V13.29), Regional enteritis of small intestine (H) (01/01/2003), and Thyroid disease.    H/o Chrons disease-- followed by GI.    1. Thyroid cancer:  She initially presented for the evaluation of thyroid nodule, first noted in 2013.  She was previously seen for this by Dr. Berry and recalls FNA biopsy which was \"benign\".  Then  she noted enlarged neck lymph nodes.  Repeat FNA biopsy of the 3.0 cm right thyroid nodule showed follicular lesion of undetermined significance. She underwent total thyroidectomy 12/11/2015 after Frozen section confirmed a follicular lesion and a small papillary carcinoma of the thyroid .  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 No abnormal activity to suggest  metastatic thyroid cancer.  Follow-up neck ultrasound 7/2016: No enlarged or abnormal-appearing lymph nodes are appreciated in the right or left neck.  TG ( suppressed ) : 1.3 ( 1/27/16)  TG ( stimulated ) : 2.7 ( 2/3/16)  F/u : 8/2018- no mets identified.  TG appears suppressed as noted below.      2. Postsurgical hypothyroidism:  She was started on Levothyroxine 150 mcg after surgery, dose was increased to 175, then increased to  dose 200 mcg qd.  CUrrent  Dose: levothryoxine  200 mcg X 6 days a week and skip X1 day a week.    On this dose X 12/2023 ( she missed few days prior to the 12/2023)  No follow up labs to review.  No major concerns today.  Takes generic.  reports compliance.    Feeling fine  Energy is OK.  S/p menopause.     Chest " pain, palpitations, tremors.  History of Crohn's disease and has on and off diarrhea.  + wt loss-- 15 lbs- intentional wt loss in last 3 months.  Now stable.    Family history is positive for mother with hypothyroidism.    No family history of thyroid cancer.    No personal history of radiation exposure to the head or neck.    She has a history of Chron's disease and has been treated with Remicade for many years.    She is a nurse.      Patient feels well at this time and denies any tachycardia, palpitations, heat intolerance, tremor, insomnia, diarrhea, or unexplained weight loss.  Patient also denies  cold intolerance, constipation, or unexplained weight gain.     Wt Readings from Last 2 Encounters:   06/30/23 88.4 kg (194 lb 12.8 oz)   04/12/22 88 kg (193 lb 14.4 oz)       PMH/PSH:  Past Medical History:   Diagnosis Date     Cancer (H) 12/11/2015    thyroid     Crohn's disease (H)      Depression      Depressive disorder      Endometriosis      GERD (gastroesophageal reflux disease)      Personal history of other genital system and obstetric disorders(V13.29)      Regional enteritis of small intestine (H) 01/01/2003    ileal disease     Thyroid disease     hypo     Past Surgical History:   Procedure Laterality Date     BIOPSY       CHOLECYSTECTOMY       COLONOSCOPY       ENT SURGERY  12/01/2015    total thyroidectomy     THYROIDECTOMY N/A 12/11/2015    Procedure: THYROIDECTOMY;  Surgeon: Shari King MD;  Location:  OR     [facility] NONSPECIFIC PROCEDURE  01/01/2000    s/p cholecystectomy     Family Hx:  Family History   Problem Relation Age of Onset     Hypertension Mother      Cervical Cancer Mother      Gastrointestinal Disease Mother         diverticulitis     Cardiovascular Mother      Hypothyroidism Mother      Coronary Artery Disease Mother      Depression Mother      Hypertension Father      Substance Abuse Father         And mother     Diabetes Paternal Grandmother      Cerebrovascular Disease Paternal  Grandmother      Thyroid disease: Yes, mother         DM2: No         Autoimmune: DM1, SLE, RA, Vitiligo:  No    Social Hx:  Social History     Socioeconomic History     Marital status:      Spouse name: Not on file     Number of children: 2     Years of education: Not on file     Highest education level: Associate degree: occupational, technical, or vocational program   Occupational History     Occupation: nurse     Employer: [employer]     Comment: cardiology nurse   Tobacco Use     Smoking status: Never     Smokeless tobacco: Never   Vaping Use     Vaping Use: Never used   Substance and Sexual Activity     Alcohol use: Yes     Comment: couple of times a year     Drug use: No     Sexual activity: Yes     Partners: Male     Birth control/protection: Pill, Male Surgical     Comment: Vasectomy   Other Topics Concern     Parent/sibling w/ CABG, MI or angioplasty before 65F 55M? No   Social History Narrative    Older son, Atul,  , with developmental delay     Social Determinants of Health     Financial Resource Strain: Low Risk  (2023)    Overall Financial Resource Strain (CARDIA)      Difficulty of Paying Living Expenses: Not very hard   Food Insecurity: No Food Insecurity (2023)    Hunger Vital Sign      Worried About Running Out of Food in the Last Year: Never true      Ran Out of Food in the Last Year: Never true   Transportation Needs: No Transportation Needs (2023)    PRAPARE - Transportation      Lack of Transportation (Medical): No      Lack of Transportation (Non-Medical): No   Physical Activity: Inactive (2023)    Exercise Vital Sign      Days of Exercise per Week: 0 days      Minutes of Exercise per Session: 0 min   Stress: Stress Concern Present (2023)    [employer] [employer] - Occupational Stress Questionnaire      Feeling of Stress : To some extent   Social Connections: Socially Isolated (2023)    Social Connection and  Isolation Panel [NHANES]      Frequency of Communication with Friends and Family: Once a week      Frequency of Social Gatherings with Friends and Family: Once a week      Attends Quaker Services: Never      Active Member of Clubs or Organizations: No      Attends Club or Organization Meetings: Not on file      Marital Status:    Interpersonal Safety: Not on file   Housing Stability: Low Risk  (6/29/2023)    Housing Stability Vital Sign      Unable to Pay for Housing in the Last Year: No      Number of Places Lived in the Last Year: 1      Unstable Housing in the Last Year: No          MEDICATIONS:  has a current medication list which includes the following prescription(s): alprazolam, bupropion, buspirone hcl, buspirone hcl, drospirenone-ethinyl estradiol, fluoxetine, infliximab, lansoprazole, levothyroxine, propranolol er, trazodone, and vitamin d.    Review of Systems  10 point ROS neg other than the symptoms noted above in the HPI.    Physical Exam   VS: There were no vitals taken for this visit.  GENERAL: healthy, alert and no distress  EYES: Eyes grossly normal to inspection, conjunctivae and sclerae normal  ENT: no nose swelling, nasal discharge.  Thyroid: s/p thyroidectomy  RESP: no audible wheeze, cough, or visible cyanosis.  No visible retractions or increased work of breathing.  Able to speak fully in complete sentences.  ABDO: not evaluated.  EXTREMITIES: no hand tremors.  NEURO: Cranial nerves grossly intact, mentation intact and speech normal  SKIN: No apparent skin lesions, rash or edema seen   PSYCH: mentation appears normal, affect normal/bright, judgement and insight intact, normal speech and appearance well-groomed      LABS:  TG:  TG ( suppressed ) : 1.3 ( 1/27/16), TSH 7.83  TG ( stimulated ) : 2.7 ( 2/3/16), TSH 1.28    6/2016:  TSH: 1.28  JUAN: <0.4  TG: <0.1    12/2016:  TSH: 0.62  JUAN: <0.4  TG: <0.1    5/2020:  TSH: 0.07  JUAN: <0.4  TG: <0.1    12/2022:  JUAN: <0.4  TG:  <0.1    12/2023:  JUAN: <0.4  TG: <0.1    TFTs:   Latest Ref Rng 12/8/2023  2:29 PM   ENDO THYROID LABS-[facility]     TSH 0.30 - 4.20 uIU/mL 23.40 (H)    Free T3 2.3 - 4.2 pg/mL    FREE T4 0.90 - 1.70 ng/dL 1.06        Surgical path:  FINAL DIAGNOSIS:  A, B, and C.  Specimens: Right thyroid lobe, prelaryngeal tissue, left thyroid  lobe.  Procedure: Total thyroidectomy.  Received: Fresh.  Specimen Integrity: Divided (thyroidectomy performed as  lobectomy and completion thyroidectomy).  Tumor Focality: Four (4) foci of microcarcinomas .  Tumor Laterality: Right lobe (x2) and left lobe (x2).    Tumor Histologic Type (all tumors): Papillary microcarcinoma;  follicular variant, infiltrative.    Tumor Size:  Tumor #1 (right lobe): 0.8 cm.  Tumor #2 (right lobe): 0.6 cm.  Tumor #3 (left lobe): 0.2 cm.  Tumor #4 (left lobe): 0.25 cm.    Margins:  Tumor #1: Involved right anterolateral margin over an area of 0.4  cm.  Tumor #2: Tumor within 0.1 cm of the nearest (right anterolateral)  margin.  Tumor #3: Tumor within fraction of a mm of the left anterolateral  and medial margins.  Tumor #4: Tumor within fraction of a millimeter of the left  anterolateral margin.    Regional Lymph Nodes: No nodes submitted or found.    Pathologic Staging (pTNM): pT1a(m), pNX, pM not applicable.    Additional Pathologic Findings:  -Hyperplastic nodules (largest 2 cm wide; within right lobe).  -Single normal-appearing parathyroid gland identified within left  lobe, mid region.  -Prelaryngeal tissue (specimen C) comprises of benign fibrovascular  tissue without lymph nodes.  Prior biopsy site changes.      NM I-131 THYROID THERAPY  2/3/2016 9:38 AM     HISTORY: Thyroid cancer.     TECHNIQUE: Patient was given 100 mCi I-131orally and instructed in  home care.         IMPRESSION: Successful I-131 therapy administration.       NUCLEAR MEDICINE I-131 WHOLE BODY SCAN February 10, 2016 8:08 AM    IMPRESSION: No metastatic disease demonstrated.    Follow up  Neck US 7/2016:                                    IMPRESSION: No enlarged or abnormal-appearing lymph nodes are  appreciated in the right or left neck.    WBC I123:  FINDINGS: Physiologic activity noted. No abnormal activity to suggest  metastatic thyroid cancer.                                                                IMPRESSION: No metastatic disease demonstrated.        All pertinent notes, labs and images personally reviewed by me.     A/P  Ms.Heather SERGE Rowell is a 53 year old here for the evaluation of:    #1.  PTC:   FNA biopsy of the 3.0 cm right thyroid nodule showed follicular lesion of undetermined significance. 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 No abnormal activity to suggest metastatic thyroid cancer.  Follow up US 6/2016- no mets  F/u I123 scan 8/2018- no mets  -- TG appear stable  Plan:   Discussed diagnosis, pathophysiology, management and treatment options of condition with pt.  Labs needed in 4 weeks.  Neck ultrasound in 4 weeks.  Continue current dose for now-- levothryoxine  200 mcg X 6 days a week and skip X1 day a week.   Dose change based on labs.    #2. Postsurgical hypothyroidism:    CUrrent  Dose: 200 mcg X 6 days a week and skip X  1 day a week.  On this dose X 12/2023.  No major concerns today.  Generic.   Clinically looks euthyroid. Goal TSH <1.0  + intentional wt loss 15 lbs. Now stable.  Plan:  Discussed diagnosis,  pathophysiology, management and treatment options of condition with pt.  Labs needed in 4 weeks.  Neck ultrasound in 4 weeks.  Continue current dose for now-- levothryoxine  200 mcg X 6 days a week and skip X1 day a week.   Dose change based on labs.    Discussed s/s of hypothyroidism and hyperthyroidism to watch for.  The patient indicates understanding of these issues and agrees with the plan.      Follow-up:  As noted in AVS    Alice Donis MD  Endocrinology   [facility]    Addendum to above note and clinic visit:    Labs reviewed.    See result note/telephone encounter.      Again, thank you for allowing me to participate in the care of your patient.        Sincerely,        Alice Donis MD

## 2024-01-09 NOTE — PROGRESS NOTES
"THIS IS A VIDEO VISIT:    Phone call visit/virtual visit encounter:    Name of patient: Meeta Rowell    Date of encounter: 1/9/2024    Time of start of video visit: 1:30    Video started: 1:40    Video ended: 1:50    Provider location: working from home/ [facility]    Patient location: patients home.    Mode of transmission: video/ Doximity    Verbal consent: obtained before starting visit. Pt is agreeable.      The patient has been notified of following:      \"This VIDEO visit will be conducted via a call between you and your physician/provider. We have found that certain health care needs can be provided without the need for a physical exam.  This service lets us provide the care you need with a short phone conversation.  If a prescription is necessary we can send it directly to your pharmacy.  If lab work is needed we can place an order for that and you can then stop by our lab to have the test done at a later time.     With new updates with corona virus patient might be billed as clinic visit.     If during the course of the call the physician/provider feels a telephone visit is not appropriate, you will not be charged for this service.\"      Past medical history, social history, family history, allergy and medications were reviewed and updated as appropriate.  Reviewed pertinent labs, notes, imaging studies personally.    Name: Meeta Rowell  F/u for postsurgical hypothyroidism and PTC. IRINA  and Nancy Garcia.  Last visit 2022.  HPI:  Meeta Rowell is a 53 year old female for above.   has a past medical history of Cancer (H) (12/11/2015), Crohn's disease (H), Depression, Depressive disorder, Endometriosis, GERD (gastroesophageal reflux disease), Personal history of other genital system and obstetric disorders(V13.29), Regional enteritis of small intestine (H) (01/01/2003), and Thyroid disease.    H/o Chrons disease-- followed by GI.    1. Thyroid cancer:  She initially presented for the " "evaluation of thyroid nodule, first noted in 2013.  She was previously seen for this by Dr. Berry and recalls FNA biopsy which was \"benign\".  Then  she noted enlarged neck lymph nodes.  Repeat FNA biopsy of the 3.0 cm right thyroid nodule showed follicular lesion of undetermined significance. She underwent total thyroidectomy 12/11/2015 after Frozen section confirmed a follicular lesion and a small papillary carcinoma of the thyroid .  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 No abnormal activity to suggest  metastatic thyroid cancer.  Follow-up neck ultrasound 7/2016: No enlarged or abnormal-appearing lymph nodes are appreciated in the right or left neck.  TG ( suppressed ) : 1.3 ( 1/27/16)  TG ( stimulated ) : 2.7 ( 2/3/16)  F/u : 8/2018- no mets identified.  TG appears suppressed as noted below.      2. Postsurgical hypothyroidism:  She was started on Levothyroxine 150 mcg after surgery, dose was increased to 175, then increased to  dose 200 mcg qd.  CUrrent  Dose: levothryoxine  200 mcg X 6 days a week and skip X1 day a week.    On this dose X 12/2023 ( she missed few days prior to the 12/2023)  No follow up labs to review.  No major concerns today.  Takes generic.  reports compliance.    Feeling fine  Energy is OK.  S/p menopause.     Chest pain, palpitations, tremors.  History of Crohn's disease and has on and off diarrhea.  + wt loss-- 15 lbs- intentional wt loss in last 3 months.  Now stable.    Family history is positive for mother with hypothyroidism.    No family history of thyroid cancer. "    No personal history of radiation exposure to the head or neck.    She has a history of Chron's disease and has been treated with Remicade for many years.    She is a nurse.      Patient feels well at this time and denies any tachycardia, palpitations, heat intolerance, tremor, insomnia, diarrhea, or unexplained weight loss.  Patient also denies  cold intolerance, constipation, or unexplained weight gain.     Wt Readings from Last 2 Encounters:   06/30/23 88.4 kg (194 lb 12.8 oz)   04/12/22 88 kg (193 lb 14.4 oz)       PMH/PSH:  Past Medical History:   Diagnosis Date    Cancer (H) 12/11/2015    thyroid    Crohn's disease (H)     Depression     Depressive disorder     Endometriosis     GERD (gastroesophageal reflux disease)     Personal history of other genital system and obstetric disorders(V13.29)     Regional enteritis of small intestine (H) 01/01/2003    ileal disease    Thyroid disease     hypo     Past Surgical History:   Procedure Laterality Date    BIOPSY      CHOLECYSTECTOMY      COLONOSCOPY      ENT SURGERY  12/01/2015    total thyroidectomy    THYROIDECTOMY N/A 12/11/2015    Procedure: THYROIDECTOMY;  Surgeon: Shari King MD;  Location:  OR    [facility] NONSPECIFIC PROCEDURE  01/01/2000    s/p cholecystectomy     Family Hx:  Family History   Problem Relation Age of Onset    Hypertension Mother     Cervical Cancer Mother     Gastrointestinal Disease Mother         diverticulitis    Cardiovascular Mother     Hypothyroidism Mother     Coronary Artery Disease Mother     Depression Mother     Hypertension Father     Substance Abuse Father         And mother    Diabetes Paternal Grandmother     Cerebrovascular Disease Paternal Grandmother      Thyroid disease: Yes, mother         DM2: No         Autoimmune: DM1, SLE, RA, Vitiligo:  No    Social Hx:  Social History     Socioeconomic History    Marital status:      Spouse name: Not on file    Number of children: 2    Years of education: Not on file     Highest education level: Associate degree: occupational, technical, or vocational program   Occupational History    Occupation: nurse     Employer: [employer]     Comment: cardiology nurse   Tobacco Use    Smoking status: Never    Smokeless tobacco: Never   Vaping Use    Vaping Use: Never used   Substance and Sexual Activity    Alcohol use: Yes     Comment: couple of times a year    Drug use: No    Sexual activity: Yes     Partners: Male     Birth control/protection: Pill, Male Surgical     Comment: Vasectomy   Other Topics Concern    Parent/sibling w/ CABG, MI or angioplasty before 65F 55M? No   Social History Narrative    Older son, Atul,  , with developmental delay     Social Determinants of Health     Financial Resource Strain: Low Risk  (2023)    Overall Financial Resource Strain (CARDIA)     Difficulty of Paying Living Expenses: Not very hard   Food Insecurity: No Food Insecurity (2023)    Hunger Vital Sign     Worried About Running Out of Food in the Last Year: Never true     Ran Out of Food in the Last Year: Never true   Transportation Needs: No Transportation Needs (2023)    PRAPARE - Transportation     Lack of Transportation (Medical): No     Lack of Transportation (Non-Medical): No   Physical Activity: Inactive (2023)    Exercise Vital Sign     Days of Exercise per Week: 0 days     Minutes of Exercise per Session: 0 min   Stress: Stress Concern Present (2023)    [employer] [place] [employer] - Occupational Stress Questionnaire     Feeling of Stress : To some extent   Social Connections: Socially Isolated (2023)    Social Connection and Isolation Panel [NHANES]     Frequency of Communication with Friends and Family: Once a week     Frequency of Social Gatherings with Friends and Family: Once a week     Attends Zoroastrian Services: Never     Active Member of Clubs or Organizations: No     Attends Club or Organization Meetings: Not on file      Marital Status:    Interpersonal Safety: Not on file   Housing Stability: Low Risk  (6/29/2023)    Housing Stability Vital Sign     Unable to Pay for Housing in the Last Year: No     Number of Places Lived in the Last Year: 1     Unstable Housing in the Last Year: No          MEDICATIONS:  has a current medication list which includes the following prescription(s): alprazolam, bupropion, buspirone hcl, buspirone hcl, drospirenone-ethinyl estradiol, fluoxetine, infliximab, lansoprazole, levothyroxine, propranolol er, trazodone, and vitamin d.    Review of Systems  10 point ROS neg other than the symptoms noted above in the HPI.    Physical Exam   VS: There were no vitals taken for this visit.  GENERAL: healthy, alert and no distress  EYES: Eyes grossly normal to inspection, conjunctivae and sclerae normal  ENT: no nose swelling, nasal discharge.  Thyroid: s/p thyroidectomy  RESP: no audible wheeze, cough, or visible cyanosis.  No visible retractions or increased work of breathing.  Able to speak fully in complete sentences.  ABDO: not evaluated.  EXTREMITIES: no hand tremors.  NEURO: Cranial nerves grossly intact, mentation intact and speech normal  SKIN: No apparent skin lesions, rash or edema seen   PSYCH: mentation appears normal, affect normal/bright, judgement and insight intact, normal speech and appearance well-groomed      LABS:  TG:  TG ( suppressed ) : 1.3 ( 1/27/16), TSH 7.83  TG ( stimulated ) : 2.7 ( 2/3/16), TSH 1.28    6/2016:  TSH: 1.28  JUAN: <0.4  TG: <0.1    12/2016:  TSH: 0.62  JUAN: <0.4  TG: <0.1    5/2020:  TSH: 0.07  JUAN: <0.4  TG: <0.1    12/2022:  JUAN: <0.4  TG: <0.1    12/2023:  JUAN: <0.4  TG: <0.1    TFTs:   Latest Ref Rng 12/8/2023  2:29 PM   ENDO THYROID LABS-UMP     TSH 0.30 - 4.20 uIU/mL 23.40 (H)    Free T3 2.3 - 4.2 pg/mL    FREE T4 0.90 - 1.70 ng/dL 1.06        Surgical path:  FINAL DIAGNOSIS:  A, B, and C.  Specimens: Right thyroid lobe, prelaryngeal tissue, left  thyroid  lobe.  Procedure: Total thyroidectomy.  Received: Fresh.  Specimen Integrity: Divided (thyroidectomy performed as  lobectomy and completion thyroidectomy).  Tumor Focality: Four (4) foci of microcarcinomas .  Tumor Laterality: Right lobe (x2) and left lobe (x2).    Tumor Histologic Type (all tumors): Papillary microcarcinoma;  follicular variant, infiltrative.    Tumor Size:  Tumor #1 (right lobe): 0.8 cm.  Tumor #2 (right lobe): 0.6 cm.  Tumor #3 (left lobe): 0.2 cm.  Tumor #4 (left lobe): 0.25 cm.    Margins:  Tumor #1: Involved right anterolateral margin over an area of 0.4  cm.  Tumor #2: Tumor within 0.1 cm of the nearest (right anterolateral)  margin.  Tumor #3: Tumor within fraction of a mm of the left anterolateral  and medial margins.  Tumor #4: Tumor within fraction of a millimeter of the left  anterolateral margin.    Regional Lymph Nodes: No nodes submitted or found.    Pathologic Staging (pTNM): pT1a(m), pNX, pM not applicable.    Additional Pathologic Findings:  -Hyperplastic nodules (largest 2 cm wide; within right lobe).  -Single normal-appearing parathyroid gland identified within left  lobe, mid region.  -Prelaryngeal tissue (specimen C) comprises of benign fibrovascular  tissue without lymph nodes.  Prior biopsy site changes.      NM I-131 THYROID THERAPY  2/3/2016 9:38 AM     HISTORY: Thyroid cancer.     TECHNIQUE: Patient was given 100 mCi I-131orally and instructed in  home care.         IMPRESSION: Successful I-131 therapy administration.       NUCLEAR MEDICINE I-131 WHOLE BODY SCAN February 10, 2016 8:08 AM    IMPRESSION: No metastatic disease demonstrated.    Follow up Neck US 7/2016:                                    IMPRESSION: No enlarged or abnormal-appearing lymph nodes are  appreciated in the right or left neck.    WBC I123:  FINDINGS: Physiologic activity noted. No abnormal activity to suggest  metastatic thyroid cancer.                                                                 IMPRESSION: No metastatic disease demonstrated.        All pertinent notes, labs and images personally reviewed by me.     A/P  Ms.Heather SERGE Rowell is a 53 year old here for the evaluation of:    #1.  PTC:   FNA biopsy of the 3.0 cm right thyroid nodule showed follicular lesion of undetermined significance. 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 No abnormal activity to suggest metastatic thyroid cancer.  Follow up US 6/2016- no mets  F/u I123 scan 8/2018- no mets  -- TG appear stable  Plan:   Discussed diagnosis, pathophysiology, management and treatment options of condition with pt.  Labs needed in 4 weeks.  Neck ultrasound in 4 weeks.  Continue current dose for now-- levothryoxine  200 mcg X 6 days a week and skip X1 day a week.   Dose change based on labs.    #2. Postsurgical hypothyroidism:    CUrrent  Dose: 200 mcg X 6 days a week and skip X  1 day a week.  On this dose X 12/2023.  No major concerns today.  Generic.   Clinically looks euthyroid. Goal TSH <1.0  + intentional wt loss 15 lbs. Now stable.  Plan:  Discussed diagnosis, pathophysiology, management and treatment options of condition with pt.  Labs needed in 4 weeks.  Neck ultrasound in 4 weeks.  Continue current dose for now-- levothryoxine  200 mcg X 6 days a week and skip X1 day a week.   Dose change based on labs.    Discussed s/s of hypothyroidism and hyperthyroidism to watch for.  The  patient indicates understanding of these issues and agrees with the plan.      Follow-up:  As noted in AVS    Alice Donis MD  Endocrinology   [facility]/[place]    Addendum to above note and clinic visit:    Labs reviewed.    See result note/telephone encounter.

## 2024-01-09 NOTE — PATIENT INSTRUCTIONS
-[facility]  Dr Donis, Endocrinology Department    [facility]   [address] [id number]  [place] [address]  Appointment [phone]  Fax: [phone]  [place]: Monday - Thursday      Labs needed in 4 weeks.  Neck ultrasound in 4 weeks.  Continue current dose for now-- levothryoxine  200 mcg X 6 days a week and skip X1 day a week.   Dose change based on labs.     [facility] radiology scheduleing   [place]  [phone]   [facility] Radiology scheduling  [place]  [phone]     Please call and schedule the recommended test as discussed in clinic visit. These are the numbers to call.    Take Levothyroxine on an empty stomach. Take it with a full glass of water at least 30 minutes to 1 hour before eating breakfast.   This medicine should be taken at least 4 hours before or 4 hours after these medicines: antacids (Maalox , Mylanta , Tums ), calcium supplements, cholestyramine (Prevalite , Questran ), colestipol (Colestid ), iron supplements, orlistat (Arjun , Xenical ), simethicone (Gas-X , Mylicon ), and sucralfate (Carafate ).   Swallow the capsule whole. Do not cut or crush it.

## 2024-02-20 ENCOUNTER — TRANSFERRED RECORDS (OUTPATIENT)
Dept: HEALTH INFORMATION MANAGEMENT | Facility: CLINIC | Age: 54
End: 2024-02-20
Payer: COMMERCIAL

## 2024-02-20 LAB
ALT SERPL-CCNC: 8 IU/L (ref 0–32)
AST SERPL-CCNC: 12 IU/L (ref 0–40)

## 2024-03-19 ENCOUNTER — MYC MEDICAL ADVICE (OUTPATIENT)
Dept: PEDIATRICS | Facility: CLINIC | Age: 54
End: 2024-03-19
Payer: COMMERCIAL

## 2024-04-03 ENCOUNTER — OFFICE VISIT (OUTPATIENT)
Dept: FAMILY MEDICINE | Facility: CLINIC | Age: 54
End: 2024-04-03
Payer: COMMERCIAL

## 2024-04-03 VITALS
HEIGHT: 62 IN | RESPIRATION RATE: 15 BRPM | WEIGHT: 208.8 LBS | HEART RATE: 68 BPM | BODY MASS INDEX: 38.42 KG/M2 | SYSTOLIC BLOOD PRESSURE: 118 MMHG | TEMPERATURE: 98.7 F | OXYGEN SATURATION: 97 % | DIASTOLIC BLOOD PRESSURE: 66 MMHG

## 2024-04-03 DIAGNOSIS — E66.812 CLASS 2 SEVERE OBESITY DUE TO EXCESS CALORIES WITH SERIOUS COMORBIDITY AND BODY MASS INDEX (BMI) OF 37.0 TO 37.9 IN ADULT (H): Primary | ICD-10-CM

## 2024-04-03 DIAGNOSIS — D09.9 PAPILLARY CARCINOMA IN SITU: ICD-10-CM

## 2024-04-03 DIAGNOSIS — E66.01 CLASS 2 SEVERE OBESITY DUE TO EXCESS CALORIES WITH SERIOUS COMORBIDITY AND BODY MASS INDEX (BMI) OF 37.0 TO 37.9 IN ADULT (H): Primary | ICD-10-CM

## 2024-04-03 PROBLEM — C73 THYROID CANCER (H): Status: ACTIVE | Noted: 2024-04-03

## 2024-04-03 PROCEDURE — 99213 OFFICE O/P EST LOW 20 MIN: CPT | Performed by: GENERAL PRACTICE

## 2024-04-03 RX ORDER — TIRZEPATIDE 2.5 MG/.5ML
2.5 INJECTION, SOLUTION SUBCUTANEOUS
Qty: 2 ML | Refills: 0 | Status: SHIPPED | OUTPATIENT
Start: 2024-04-03 | End: 2024-06-14

## 2024-04-03 RX ORDER — TIRZEPATIDE 5 MG/.5ML
5 INJECTION, SOLUTION SUBCUTANEOUS
Qty: 2 ML | Refills: 0 | Status: SHIPPED | OUTPATIENT
Start: 2024-05-03 | End: 2024-06-14

## 2024-04-03 RX ORDER — FERROUS GLUCONATE 324(37.5)
1 TABLET ORAL
COMMUNITY

## 2024-04-03 ASSESSMENT — PATIENT HEALTH QUESTIONNAIRE - PHQ9
SUM OF ALL RESPONSES TO PHQ QUESTIONS 1-9: 5
SUM OF ALL RESPONSES TO PHQ QUESTIONS 1-9: 5
10. IF YOU CHECKED OFF ANY PROBLEMS, HOW DIFFICULT HAVE THESE PROBLEMS MADE IT FOR YOU TO DO YOUR WORK, TAKE CARE OF THINGS AT HOME, OR GET ALONG WITH OTHER PEOPLE: SOMEWHAT DIFFICULT

## 2024-04-03 ASSESSMENT — PAIN SCALES - GENERAL: PAINLEVEL: NO PAIN (0)

## 2024-04-03 NOTE — PROGRESS NOTES
"  Assessment & Plan     Class 2 severe obesity due to excess calories with serious comorbidity and body mass index (BMI) of 37.0 to 37.9 in adult (H)  Discussed option of weight loss management referral  Discussed SE of tirzepatide  Follow-up in 4-6 after starting the medication  Check baseline kidney function  - tirzepatide (MOUNJARO) 2.5 MG/0.5ML pen; Inject 2.5 mg Subcutaneous every 7 days  - tirzepatide (MOUNJARO) 5 MG/0.5ML pen; Inject 5 mg Subcutaneous every 7 days    Papillary carcinoma in situ  In remission            BMI  Estimated body mass index is 38.19 kg/m  as calculated from the following:    Height as of this encounter: 1.575 m (5' 2\").    Weight as of this encounter: 94.7 kg (208 lb 12.8 oz).   Weight management plan: Discussed healthy diet and exercise guidelines          Luz Elena Tim is a 53 year old, presenting for the following health issues:  Weight Problem (WOULD LIKE TO DISCUSS WEIGHT LOSS MEDICATION OPTIONS )        4/3/2024     2:41 PM   Additional Questions   Roomed by Yasmine SIMS LPN     History of Present Illness       Reason for visit:  Weight loss medication    She eats 2-3 servings of fruits and vegetables daily.She consumes 1 sweetened beverage(s) daily.She exercises with enough effort to increase her heart rate 9 or less minutes per day.  She exercises with enough effort to increase her heart rate 3 or less days per week.   She is taking medications regularly.     Pt would like to discuss options for weight loss management.                 Review of Systems  Constitutional, neuro, ENT, endocrine, pulmonary, cardiac, gastrointestinal, genitourinary, musculoskeletal, integument and psychiatric systems are negative, except as otherwise noted.      Objective    /66   Pulse 68   Temp 98.7  F (37.1  C) (Oral)   Resp 15   Ht 1.575 m (5' 2\")   Wt 94.7 kg (208 lb 12.8 oz)   SpO2 97%   BMI 38.19 kg/m    Body mass index is 38.19 kg/m .  Physical Exam  Constitutional:       " Appearance: Normal appearance.   HENT:      Head: Normocephalic and atraumatic.      Mouth/Throat:      Mouth: Mucous membranes are moist.   Eyes:      Extraocular Movements: Extraocular movements intact.   Cardiovascular:      Rate and Rhythm: Normal rate and regular rhythm.   Pulmonary:      Effort: Pulmonary effort is normal.      Breath sounds: Normal breath sounds.   Abdominal:      Palpations: Abdomen is soft.   Musculoskeletal:         General: Normal range of motion.      Cervical back: Normal range of motion.   Skin:     General: Skin is warm.   Neurological:      General: No focal deficit present.      Mental Status: She is alert and oriented to person, place, and time. Mental status is at baseline.   Psychiatric:         Mood and Affect: Mood normal.         Behavior: Behavior normal.         Thought Content: Thought content normal.         Judgment: Judgment normal.                    Signed Electronically by: Magdalena Rooney MD

## 2024-04-24 DIAGNOSIS — Z30.41 ENCOUNTER FOR SURVEILLANCE OF CONTRACEPTIVE PILLS: ICD-10-CM

## 2024-04-24 RX ORDER — DROSPIRENONE AND ETHINYL ESTRADIOL 0.03MG-3MG
1 KIT ORAL DAILY
Qty: 84 TABLET | Refills: 0 | Status: SHIPPED | OUTPATIENT
Start: 2024-04-24 | End: 2024-06-14

## 2024-05-08 ENCOUNTER — E-VISIT (OUTPATIENT)
Dept: PEDIATRICS | Facility: CLINIC | Age: 54
End: 2024-05-08
Payer: COMMERCIAL

## 2024-05-08 DIAGNOSIS — E66.812 CLASS 2 SEVERE OBESITY DUE TO EXCESS CALORIES WITH SERIOUS COMORBIDITY AND BODY MASS INDEX (BMI) OF 38.0 TO 38.9 IN ADULT (H): Primary | ICD-10-CM

## 2024-05-08 DIAGNOSIS — E66.01 CLASS 2 SEVERE OBESITY DUE TO EXCESS CALORIES WITH SERIOUS COMORBIDITY AND BODY MASS INDEX (BMI) OF 38.0 TO 38.9 IN ADULT (H): Primary | ICD-10-CM

## 2024-05-08 DIAGNOSIS — R73.01 IMPAIRED FASTING GLUCOSE: ICD-10-CM

## 2024-05-08 PROCEDURE — 99421 OL DIG E/M SVC 5-10 MIN: CPT | Mod: 93 | Performed by: INTERNAL MEDICINE

## 2024-05-08 RX ORDER — METFORMIN HCL 500 MG
TABLET, EXTENDED RELEASE 24 HR ORAL
Qty: 360 TABLET | Refills: 1 | Status: SHIPPED | OUTPATIENT
Start: 2024-05-08

## 2024-05-08 NOTE — PATIENT INSTRUCTIONS
Thank you for choosing us for your care. I have placed an order for a prescription so that you can start treatment. View your full visit summary for details by clicking on the link below. Your pharmacist will able to address any questions you may have about the medication.     If you're not feeling better within 5-7 days, please schedule an appointment.  You can schedule an appointment right here in [facility], or call [phone]  If the visit is for the same symptoms as your eVisit, we'll refund the cost of your eVisit if seen within seven days.

## 2024-05-08 NOTE — TELEPHONE ENCOUNTER
"Provider E-Visit time total (minutes):  5mins    Estimated body mass index is 38.19 kg/m  as calculated from the following:    Height as of 4/3/24: 1.575 m (5' 2\").    Weight as of 4/3/24: 94.7 kg (208 lb 12.8 oz).     Lab Results   Component Value Date    A1C 5.8 06/30/2023    A1C 5.6 04/12/2022    A1C 5.7 02/23/2021    A1C 5.7 01/15/2019    A1C 5.8 12/28/2017    A1C 5.5 11/11/2016    A1C 5.8 12/08/2015      "

## 2024-05-31 ENCOUNTER — PATIENT OUTREACH (OUTPATIENT)
Dept: CARE COORDINATION | Facility: CLINIC | Age: 54
End: 2024-05-31
Payer: COMMERCIAL

## 2024-06-14 ENCOUNTER — OFFICE VISIT (OUTPATIENT)
Dept: PEDIATRICS | Facility: CLINIC | Age: 54
End: 2024-06-14
Payer: COMMERCIAL

## 2024-06-14 ENCOUNTER — PATIENT OUTREACH (OUTPATIENT)
Dept: CARE COORDINATION | Facility: CLINIC | Age: 54
End: 2024-06-14

## 2024-06-14 VITALS
HEART RATE: 69 BPM | TEMPERATURE: 98.5 F | RESPIRATION RATE: 18 BRPM | WEIGHT: 201.7 LBS | OXYGEN SATURATION: 97 % | DIASTOLIC BLOOD PRESSURE: 84 MMHG | HEIGHT: 61 IN | SYSTOLIC BLOOD PRESSURE: 133 MMHG | BODY MASS INDEX: 38.08 KG/M2

## 2024-06-14 DIAGNOSIS — F41.1 GENERALIZED ANXIETY DISORDER: ICD-10-CM

## 2024-06-14 DIAGNOSIS — Z12.4 SCREENING FOR CERVICAL CANCER: ICD-10-CM

## 2024-06-14 DIAGNOSIS — D84.9 IMMUNOSUPPRESSED STATUS (H): ICD-10-CM

## 2024-06-14 DIAGNOSIS — F51.04 PSYCHOPHYSIOLOGICAL INSOMNIA: ICD-10-CM

## 2024-06-14 DIAGNOSIS — K21.9 GASTROESOPHAGEAL REFLUX DISEASE WITHOUT ESOPHAGITIS: ICD-10-CM

## 2024-06-14 DIAGNOSIS — R73.03 PREDIABETES: ICD-10-CM

## 2024-06-14 DIAGNOSIS — Z85.850 HISTORY OF THYROID CANCER: ICD-10-CM

## 2024-06-14 DIAGNOSIS — F40.243 FEAR OF FLYING: ICD-10-CM

## 2024-06-14 DIAGNOSIS — K50.90 CROHN'S DISEASE WITHOUT COMPLICATION, UNSPECIFIED GASTROINTESTINAL TRACT LOCATION (H): ICD-10-CM

## 2024-06-14 DIAGNOSIS — F33.41 RECURRENT MAJOR DEPRESSIVE DISORDER, IN PARTIAL REMISSION (H): Primary | ICD-10-CM

## 2024-06-14 DIAGNOSIS — E66.01 CLASS 2 SEVERE OBESITY DUE TO EXCESS CALORIES WITH SERIOUS COMORBIDITY AND BODY MASS INDEX (BMI) OF 37.0 TO 37.9 IN ADULT (H): ICD-10-CM

## 2024-06-14 DIAGNOSIS — R87.619 ENDOMETRIAL CELLS ON CERVICAL PAP SMEAR INCONSISTENT W/LMP: ICD-10-CM

## 2024-06-14 DIAGNOSIS — E66.812 CLASS 2 SEVERE OBESITY DUE TO EXCESS CALORIES WITH SERIOUS COMORBIDITY AND BODY MASS INDEX (BMI) OF 37.0 TO 37.9 IN ADULT (H): ICD-10-CM

## 2024-06-14 LAB
ERYTHROCYTE [DISTWIDTH] IN BLOOD BY AUTOMATED COUNT: 12.5 % (ref 10–15)
HBA1C MFR BLD: 5.4 % (ref 0–5.6)
HCT VFR BLD AUTO: 41.8 % (ref 35–47)
HGB BLD-MCNC: 13.9 G/DL (ref 11.7–15.7)
MCH RBC QN AUTO: 31.4 PG (ref 26.5–33)
MCHC RBC AUTO-ENTMCNC: 33.3 G/DL (ref 31.5–36.5)
MCV RBC AUTO: 95 FL (ref 78–100)
PLATELET # BLD AUTO: 265 10E3/UL (ref 150–450)
RBC # BLD AUTO: 4.42 10E6/UL (ref 3.8–5.2)
WBC # BLD AUTO: 9.5 10E3/UL (ref 4–11)

## 2024-06-14 PROCEDURE — 99214 OFFICE O/P EST MOD 30 MIN: CPT | Mod: 25 | Performed by: INTERNAL MEDICINE

## 2024-06-14 PROCEDURE — 96127 BRIEF EMOTIONAL/BEHAV ASSMT: CPT | Performed by: INTERNAL MEDICINE

## 2024-06-14 PROCEDURE — 85027 COMPLETE CBC AUTOMATED: CPT | Performed by: INTERNAL MEDICINE

## 2024-06-14 PROCEDURE — G0124 SCREEN C/V THIN LAYER BY MD: HCPCS

## 2024-06-14 PROCEDURE — 90471 IMMUNIZATION ADMIN: CPT | Performed by: INTERNAL MEDICINE

## 2024-06-14 PROCEDURE — 87624 HPV HI-RISK TYP POOLED RSLT: CPT | Performed by: INTERNAL MEDICINE

## 2024-06-14 PROCEDURE — 83550 IRON BINDING TEST: CPT | Performed by: INTERNAL MEDICINE

## 2024-06-14 PROCEDURE — 36415 COLL VENOUS BLD VENIPUNCTURE: CPT | Performed by: INTERNAL MEDICINE

## 2024-06-14 PROCEDURE — 90750 HZV VACC RECOMBINANT IM: CPT | Performed by: INTERNAL MEDICINE

## 2024-06-14 PROCEDURE — 90677 PCV20 VACCINE IM: CPT | Performed by: INTERNAL MEDICINE

## 2024-06-14 PROCEDURE — 83540 ASSAY OF IRON: CPT | Performed by: INTERNAL MEDICINE

## 2024-06-14 PROCEDURE — 80061 LIPID PANEL: CPT | Performed by: INTERNAL MEDICINE

## 2024-06-14 PROCEDURE — G0145 SCR C/V CYTO,THINLAYER,RESCR: HCPCS | Performed by: INTERNAL MEDICINE

## 2024-06-14 PROCEDURE — 83036 HEMOGLOBIN GLYCOSYLATED A1C: CPT | Performed by: INTERNAL MEDICINE

## 2024-06-14 PROCEDURE — 90472 IMMUNIZATION ADMIN EACH ADD: CPT | Performed by: INTERNAL MEDICINE

## 2024-06-14 PROCEDURE — 80048 BASIC METABOLIC PNL TOTAL CA: CPT | Performed by: INTERNAL MEDICINE

## 2024-06-14 RX ORDER — BUSPIRONE HYDROCHLORIDE 30 MG/1
30 TABLET ORAL 2 TIMES DAILY
Qty: 180 TABLET | Refills: 1 | Status: SHIPPED | OUTPATIENT
Start: 2024-06-14

## 2024-06-14 RX ORDER — BUPROPION HYDROCHLORIDE 150 MG/1
450 TABLET ORAL EVERY MORNING
Qty: 270 TABLET | Refills: 3 | Status: SHIPPED | OUTPATIENT
Start: 2024-06-14

## 2024-06-14 RX ORDER — PROPRANOLOL HCL 60 MG
60 CAPSULE, EXTENDED RELEASE 24HR ORAL DAILY
Qty: 90 CAPSULE | Refills: 3 | Status: SHIPPED | OUTPATIENT
Start: 2024-06-14

## 2024-06-14 RX ORDER — ALPRAZOLAM 0.25 MG
.25-.5 TABLET ORAL 2 TIMES DAILY PRN
Qty: 8 TABLET | Refills: 0 | Status: SHIPPED | OUTPATIENT
Start: 2024-06-14 | End: 2024-08-20

## 2024-06-14 RX ORDER — TRAZODONE HYDROCHLORIDE 100 MG/1
TABLET ORAL
Qty: 360 TABLET | Refills: 3 | Status: SHIPPED | OUTPATIENT
Start: 2024-06-14

## 2024-06-14 RX ORDER — LANSOPRAZOLE 30 MG/1
30 CAPSULE, DELAYED RELEASE ORAL DAILY
Qty: 90 CAPSULE | Refills: 3 | Status: SHIPPED | OUTPATIENT
Start: 2024-06-14

## 2024-06-14 RX ORDER — FLUOXETINE 40 MG/1
80 CAPSULE ORAL DAILY
Qty: 180 CAPSULE | Refills: 3 | Status: SHIPPED | OUTPATIENT
Start: 2024-06-14

## 2024-06-14 ASSESSMENT — ANXIETY QUESTIONNAIRES
2. NOT BEING ABLE TO STOP OR CONTROL WORRYING: NOT AT ALL
3. WORRYING TOO MUCH ABOUT DIFFERENT THINGS: SEVERAL DAYS
8. IF YOU CHECKED OFF ANY PROBLEMS, HOW DIFFICULT HAVE THESE MADE IT FOR YOU TO DO YOUR WORK, TAKE CARE OF THINGS AT HOME, OR GET ALONG WITH OTHER PEOPLE?: SOMEWHAT DIFFICULT
1. FEELING NERVOUS, ANXIOUS, OR ON EDGE: SEVERAL DAYS
GAD7 TOTAL SCORE: 3
IF YOU CHECKED OFF ANY PROBLEMS ON THIS QUESTIONNAIRE, HOW DIFFICULT HAVE THESE PROBLEMS MADE IT FOR YOU TO DO YOUR WORK, TAKE CARE OF THINGS AT HOME, OR GET ALONG WITH OTHER PEOPLE: SOMEWHAT DIFFICULT
7. FEELING AFRAID AS IF SOMETHING AWFUL MIGHT HAPPEN: NOT AT ALL
4. TROUBLE RELAXING: NOT AT ALL
5. BEING SO RESTLESS THAT IT IS HARD TO SIT STILL: NOT AT ALL
7. FEELING AFRAID AS IF SOMETHING AWFUL MIGHT HAPPEN: NOT AT ALL
GAD7 TOTAL SCORE: 3
6. BECOMING EASILY ANNOYED OR IRRITABLE: SEVERAL DAYS

## 2024-06-14 ASSESSMENT — PAIN SCALES - GENERAL: PAINLEVEL: NO PAIN (0)

## 2024-06-14 NOTE — PROGRESS NOTES
Assessment & Plan     Recurrent major depressive disorder, in partial remission (H24)  Feels doing well.  Does not want to adjust medication, continue as is  - CBC with platelets; Future  - Iron & Iron Binding Capacity; Future  - Hemoglobin A1c; Future  - CBC with platelets  - Iron & Iron Binding Capacity  - Hemoglobin A1c    Generalized anxiety disorder  Controlled, continue medication   - CBC with platelets; Future  - Iron & Iron Binding Capacity; Future  - Hemoglobin A1c; Future  - Basic metabolic panel  (Ca, Cl, CO2, Creat, Gluc, K, Na, BUN); Future  - FLUoxetine (PROZAC) 40 MG capsule; Take 2 capsules (80 mg) by mouth daily  - propranolol ER (INDERAL LA) 60 MG 24 hr capsule; Take 1 capsule (60 mg) by mouth daily  - CBC with platelets  - Iron & Iron Binding Capacity  - Hemoglobin A1c  - Basic metabolic panel  (Ca, Cl, CO2, Creat, Gluc, K, Na, BUN)    Psychophysiological insomnia  Controlled, continue medication   - traZODone (DESYREL) 100 MG tablet; TAKE ONE TO FOUR TABLETS (100MG TO 400MG) BY MOUTH AT BEDTIME    Fear of flying  Refill for flight in fall  - ALPRAZolam (XANAX) 0.25 MG tablet; Take 1-2 tablets (0.25-0.5 mg) by mouth 2 times daily as needed for anxiety (anxiety due to flying)    Class 2 severe obesity due to excess calories with serious comorbidity and body mass index (BMI) of 37.0 to 37.9 in adult (H)  Discussed options.  Try zepbound prescription to see if insurance will cover.  If does, stop metformin and follow-up on mychart to adjust dose.  Continue to work on healthy lifestyle  - tirzepatide-Weight Management (ZEPBOUND) 2.5 MG/0.5ML prefilled pen; Inject 0.5 mLs (2.5 mg) Subcutaneous every 7 days    Prediabetes  Discussed glucose elevation.  Discuss this is a pre-diabetic condition.  Recommended eating healthily, exercising and maintaining a healthy weight to prevent the development of diabetes.  Recommended blood sugar checks at least yearly to monitor this.  Switch metformin to zepbound  if insurance will cover  - CBC with platelets; Future  - Hemoglobin A1c; Future  - Lipid panel reflex to direct LDL Non-fasting; Future  - Basic metabolic panel  (Ca, Cl, CO2, Creat, Gluc, K, Na, BUN); Future  - CBC with platelets  - Hemoglobin A1c  - Lipid panel reflex to direct LDL Non-fasting  - Basic metabolic panel  (Ca, Cl, CO2, Creat, Gluc, K, Na, BUN)    History of thyroid cancer  Continue follow-up with endo    Immunosuppressed status (H24)  Discussed.  Get vaccines per orders today and flu and covid boosters in the fall  - CBC with platelets; Future  - Iron & Iron Binding Capacity; Future  - Basic metabolic panel  (Ca, Cl, CO2, Creat, Gluc, K, Na, BUN); Future  - CBC with platelets  - Iron & Iron Binding Capacity  - Basic metabolic panel  (Ca, Cl, CO2, Creat, Gluc, K, Na, BUN)    Crohn's disease without complication, unspecified gastrointestinal tract location (H)  Controlled.  Continue remicade and follow-up with GI    Gastroesophageal reflux disease without esophagitis  Continue ppi  - CBC with platelets; Future  - Iron & Iron Binding Capacity; Future  - Basic metabolic panel  (Ca, Cl, CO2, Creat, Gluc, K, Na, BUN); Future  - LANsoprazole (PREVACID) 30 MG DR capsule; Take 1 capsule (30 mg) by mouth daily  - CBC with platelets  - Iron & Iron Binding Capacity  - Basic metabolic panel  (Ca, Cl, CO2, Creat, Gluc, K, Na, BUN)    Screening for cervical cancer    - Pap Screen with HPV - Recommended Age 30 - 65 Years            See Patient Instructions    Luz Elena Tim is a 53 year old, presenting for the following health issues:  Recheck Medication and Gyn Exam (Pap)        6/14/2024     1:51 PM   Additional Questions   Roomed by SOURAV Lee   Accompanied by n/a         6/14/2024     1:51 PM   Patient Reported Additional Medications   Patient reports taking the following new medications n/a     History of Present Illness       Mental Health Follow-up:  Patient presents to follow-up on Depression &  "Anxiety.Patient's depression since last visit has been:  Good  The patient is not having other symptoms associated with depression.  Patient's anxiety since last visit has been:  Good  The patient is not having other symptoms associated with anxiety.  Any significant life events: No  Patient is not feeling anxious or having panic attacks.  Patient has no concerns about alcohol or drug use.    Hypothyroidism:     Since last visit, patient describes the following symptoms::  Weight gain    Weight gain::  11-15 lbs.    Reason for visit:  Annual exam    She eats 2-3 servings of fruits and vegetables daily.She consumes 0 sweetened beverage(s) daily.She exercises with enough effort to increase her heart rate 30 to 60 minutes per day.  She exercises with enough effort to increase her heart rate 3 or less days per week.   She is taking medications regularly.     Weight - insurance declined mounjaro.  Is doing WW and walking 3-5 hrs/wk since April.  Is taking metformin since mounjaro denied.  Has some diarrhea with metformin but manageable.      The 10-year ASCVD risk score (Kanwal COVINGTON, et al., 2019) is: 1.7%    Values used to calculate the score:      Age: 53 years      Sex: Female      Is Non- : No      Diabetic: No      Tobacco smoker: No      Systolic Blood Pressure: 133 mmHg      Is BP treated: No      HDL Cholesterol: 70 mg/dL      Total Cholesterol: 216 mg/dL     Mood has been doing well.  Has travel to [facility] in September.      4/12/2022     2:39 PM 6/29/2023    10:11 PM 4/3/2024     1:06 PM   PHQ-9 SCORE   PHQ-9 Total Score MyChart 4 (Minimal depression) 5 (Mild depression) 5 (Mild depression)   PHQ-9 Total Score 4 5 5          4/12/2022     2:39 PM 6/30/2023     2:01 PM 6/14/2024     1:14 PM   JANIS-7 SCORE   Total Score 3 (minimal anxiety)  3 (minimal anxiety)   Total Score 3 3 3              Objective    /84   Pulse 69   Temp 98.5  F (36.9  C) (Oral)   Resp 18   Ht 1.549 m (5' 1\")   " Wt 91.5 kg (201 lb 11.2 oz)   LMP  (LMP Unknown)   SpO2 97%   Breastfeeding No   BMI 38.11 kg/m    Body mass index is 38.11 kg/m .  Physical Exam   GENERAL: alert and no distress  EYES: Eyes grossly normal to inspection, PERRL and conjunctivae and sclerae normal  HENT: ear canals and TM's normal, nose and mouth without ulcers or lesions  NECK: no adenopathy, no asymmetry, masses, or scars  RESP: lungs clear to auscultation - no rales, rhonchi or wheezes  CV: regular rate and rhythm, normal S1 S2, no S3 or S4, no murmur, click or rub, no peripheral edema  ABDOMEN: soft, nontender, no hepatosplenomegaly, no masses and bowel sounds normal   (female): normal female external genitalia, normal urethral meatus, normal vaginal mucosa  MS: no gross musculoskeletal defects noted, no edema  SKIN: no suspicious lesions or rashes  NEURO: Normal strength and tone, mentation intact and speech normal  PSYCH: mentation appears normal, affect normal/bright          The longitudinal plan of care for the diagnosis(es)/condition(s) as documented were addressed during this visit. Due to the added complexity in care, I will continue to support Meeta in the subsequent management and with ongoing continuity of care.    Signed Electronically by: Jesenia Weinberg MD

## 2024-06-14 NOTE — PATIENT INSTRUCTIONS
Stop the shen and let me know if you get hot flashes or menopause symptoms and then I would start you on hormone replacement therapy.    If the zepbound gets approved, stop the metformin and switch to that.  Let me know how it is working and if you have side effects in about 3 weeks to determine if the dose should be increased.

## 2024-06-15 LAB
ANION GAP SERPL CALCULATED.3IONS-SCNC: 14 MMOL/L (ref 7–15)
BUN SERPL-MCNC: 11 MG/DL (ref 6–20)
CALCIUM SERPL-MCNC: 9.1 MG/DL (ref 8.6–10)
CHLORIDE SERPL-SCNC: 104 MMOL/L (ref 98–107)
CHOLEST SERPL-MCNC: 186 MG/DL
CREAT SERPL-MCNC: 0.89 MG/DL (ref 0.51–0.95)
DEPRECATED HCO3 PLAS-SCNC: 21 MMOL/L (ref 22–29)
EGFRCR SERPLBLD CKD-EPI 2021: 77 ML/MIN/1.73M2
FASTING STATUS PATIENT QL REPORTED: YES
FASTING STATUS PATIENT QL REPORTED: YES
GLUCOSE SERPL-MCNC: 94 MG/DL (ref 70–99)
HDLC SERPL-MCNC: 68 MG/DL
IRON BINDING CAPACITY (ROCHE): 400 UG/DL (ref 240–430)
IRON SATN MFR SERPL: 15 % (ref 15–46)
IRON SERPL-MCNC: 60 UG/DL (ref 37–145)
LDLC SERPL CALC-MCNC: 84 MG/DL
NONHDLC SERPL-MCNC: 118 MG/DL
POTASSIUM SERPL-SCNC: 4.3 MMOL/L (ref 3.4–5.3)
SODIUM SERPL-SCNC: 139 MMOL/L (ref 135–145)
TRIGL SERPL-MCNC: 169 MG/DL

## 2024-06-17 LAB
HPV HR 12 DNA CVX QL NAA+PROBE: NEGATIVE
HPV16 DNA CVX QL NAA+PROBE: NEGATIVE
HPV18 DNA CVX QL NAA+PROBE: NEGATIVE
HUMAN PAPILLOMA VIRUS FINAL DIAGNOSIS: NORMAL

## 2024-06-24 LAB
BKR LAB AP GYN ADEQUACY: ABNORMAL
BKR LAB AP GYN INTERPRETATION: ABNORMAL
BKR LAB AP GYN OTHER FINDINGS: ABNORMAL
BKR LAB AP PREVIOUS ABNORMAL: ABNORMAL
PATH REPORT.COMMENTS IMP SPEC: ABNORMAL
PATH REPORT.COMMENTS IMP SPEC: ABNORMAL
PATH REPORT.RELEVANT HX SPEC: ABNORMAL

## 2024-08-05 ENCOUNTER — MYC MEDICAL ADVICE (OUTPATIENT)
Dept: PEDIATRICS | Facility: CLINIC | Age: 54
End: 2024-08-05
Payer: COMMERCIAL

## 2024-08-05 ENCOUNTER — HOSPITAL ENCOUNTER (OUTPATIENT)
Dept: ULTRASOUND IMAGING | Facility: CLINIC | Age: 54
Discharge: HOME | End: 2024-08-05
Attending: INTERNAL MEDICINE
Payer: COMMERCIAL

## 2024-08-05 DIAGNOSIS — R93.89 ABNORMAL PELVIC ULTRASOUND: ICD-10-CM

## 2024-08-05 DIAGNOSIS — R87.619 ENDOMETRIAL CELLS ON CERVICAL PAP SMEAR INCONSISTENT W/LMP: ICD-10-CM

## 2024-08-05 DIAGNOSIS — Z30.41 ENCOUNTER FOR SURVEILLANCE OF CONTRACEPTIVE PILLS: ICD-10-CM

## 2024-08-05 DIAGNOSIS — D09.9 PAPILLARY CARCINOMA IN SITU: ICD-10-CM

## 2024-08-05 DIAGNOSIS — E89.0 POSTSURGICAL HYPOTHYROIDISM: ICD-10-CM

## 2024-08-05 DIAGNOSIS — R87.619 ENDOMETRIAL CELLS ON CERVICAL PAP SMEAR INCONSISTENT W/LMP: Primary | ICD-10-CM

## 2024-08-05 PROCEDURE — 76830 TRANSVAGINAL US NON-OB: CPT

## 2024-08-05 PROCEDURE — 76536 US EXAM OF HEAD AND NECK: CPT

## 2024-08-05 RX ORDER — DROSPIRENONE AND ETHINYL ESTRADIOL 0.03MG-3MG
1 KIT ORAL DAILY
Qty: 84 TABLET | Refills: 0 | Status: SHIPPED | OUTPATIENT
Start: 2024-08-05

## 2024-08-08 ENCOUNTER — MYC MEDICAL ADVICE (OUTPATIENT)
Dept: PEDIATRICS | Facility: CLINIC | Age: 54
End: 2024-08-08
Payer: COMMERCIAL

## 2024-08-16 ENCOUNTER — TRANSFERRED RECORDS (OUTPATIENT)
Dept: HEALTH INFORMATION MANAGEMENT | Facility: CLINIC | Age: 54
End: 2024-08-16
Payer: COMMERCIAL

## 2024-08-16 LAB
ALT SERPL-CCNC: 10 IU/L (ref 0–32)
AST SERPL-CCNC: 13 IU/L (ref 0–40)

## 2024-08-19 ENCOUNTER — MYC MEDICAL ADVICE (OUTPATIENT)
Dept: PEDIATRICS | Facility: CLINIC | Age: 54
End: 2024-08-19
Payer: COMMERCIAL

## 2024-08-20 ENCOUNTER — E-VISIT (OUTPATIENT)
Dept: PEDIATRICS | Facility: CLINIC | Age: 54
End: 2024-08-20
Payer: COMMERCIAL

## 2024-08-20 DIAGNOSIS — F40.243 FEAR OF FLYING: ICD-10-CM

## 2024-08-20 DIAGNOSIS — F41.1 GENERALIZED ANXIETY DISORDER: Primary | ICD-10-CM

## 2024-08-20 PROCEDURE — 99421 OL DIG E/M SVC 5-10 MIN: CPT | Performed by: INTERNAL MEDICINE

## 2024-08-20 RX ORDER — ALPRAZOLAM 0.25 MG
.25-.5 TABLET ORAL 2 TIMES DAILY PRN
Qty: 12 TABLET | Refills: 0 | Status: SHIPPED | OUTPATIENT
Start: 2024-08-20 | End: 2024-09-27

## 2024-08-20 ASSESSMENT — ANXIETY QUESTIONNAIRES
GAD7 TOTAL SCORE: 17
GAD7 TOTAL SCORE: 17
2. NOT BEING ABLE TO STOP OR CONTROL WORRYING: NEARLY EVERY DAY
5. BEING SO RESTLESS THAT IT IS HARD TO SIT STILL: MORE THAN HALF THE DAYS
8. IF YOU CHECKED OFF ANY PROBLEMS, HOW DIFFICULT HAVE THESE MADE IT FOR YOU TO DO YOUR WORK, TAKE CARE OF THINGS AT HOME, OR GET ALONG WITH OTHER PEOPLE?: VERY DIFFICULT
6. BECOMING EASILY ANNOYED OR IRRITABLE: MORE THAN HALF THE DAYS
2. NOT BEING ABLE TO STOP OR CONTROL WORRYING: NEARLY EVERY DAY
7. FEELING AFRAID AS IF SOMETHING AWFUL MIGHT HAPPEN: MORE THAN HALF THE DAYS
1. FEELING NERVOUS, ANXIOUS, OR ON EDGE: NEARLY EVERY DAY
7. FEELING AFRAID AS IF SOMETHING AWFUL MIGHT HAPPEN: MORE THAN HALF THE DAYS
7. FEELING AFRAID AS IF SOMETHING AWFUL MIGHT HAPPEN: MORE THAN HALF THE DAYS
5. BEING SO RESTLESS THAT IT IS HARD TO SIT STILL: MORE THAN HALF THE DAYS
8. IF YOU CHECKED OFF ANY PROBLEMS, HOW DIFFICULT HAVE THESE MADE IT FOR YOU TO DO YOUR WORK, TAKE CARE OF THINGS AT HOME, OR GET ALONG WITH OTHER PEOPLE?: VERY DIFFICULT
IF YOU CHECKED OFF ANY PROBLEMS ON THIS QUESTIONNAIRE, HOW DIFFICULT HAVE THESE PROBLEMS MADE IT FOR YOU TO DO YOUR WORK, TAKE CARE OF THINGS AT HOME, OR GET ALONG WITH OTHER PEOPLE: VERY DIFFICULT
3. WORRYING TOO MUCH ABOUT DIFFERENT THINGS: MORE THAN HALF THE DAYS
6. BECOMING EASILY ANNOYED OR IRRITABLE: MORE THAN HALF THE DAYS
4. TROUBLE RELAXING: NEARLY EVERY DAY
3. WORRYING TOO MUCH ABOUT DIFFERENT THINGS: MORE THAN HALF THE DAYS
4. TROUBLE RELAXING: NEARLY EVERY DAY
1. FEELING NERVOUS, ANXIOUS, OR ON EDGE: NEARLY EVERY DAY
GAD7 TOTAL SCORE: 17
7. FEELING AFRAID AS IF SOMETHING AWFUL MIGHT HAPPEN: MORE THAN HALF THE DAYS
GAD7 TOTAL SCORE: 17
IF YOU CHECKED OFF ANY PROBLEMS ON THIS QUESTIONNAIRE, HOW DIFFICULT HAVE THESE PROBLEMS MADE IT FOR YOU TO DO YOUR WORK, TAKE CARE OF THINGS AT HOME, OR GET ALONG WITH OTHER PEOPLE: VERY DIFFICULT

## 2024-08-21 ENCOUNTER — TRANSFERRED RECORDS (OUTPATIENT)
Dept: HEALTH INFORMATION MANAGEMENT | Facility: CLINIC | Age: 54
End: 2024-08-21
Payer: COMMERCIAL

## 2024-09-20 NOTE — PROGRESS NOTES
SUBJECTIVE:      I spent a total of 25 minutes on the care of Meeta on the day of service including 20 minutes of face-to-face time with remainder in chart review, care coordination, documentation on the day of service.  In addition to that 5 extra minutes were spent on performing an endometrial sample/endometrial biopsy.                                                 Meeta Rowell is a 53 year old female who presents to clinic today for the following health issue(s):  No chief complaint on file.    Meeta is a 53-year-old P0 who presents for evaluation of abnormal Pap smear and abnormal normal ultrasound.    She had a abnormal Pap smear with additional endometrial cells more so than average and it was suggested to have a pelvic ultrasound as result.  The pelvic ultrasound demonstrated an endometrial lining of 6 mm no masses within the lining however she does have what appears to probably be a fibroid about 3 cm in the fundus area of the uterus.  In addition on the left side there is a 7 cm simple cyst.    We discussed each of those implications suggesting that she have an endometrial sample as well as an ECC today and then if she would like to expectantly manage the cyst there be possible to follow that up with ultrasound in several months but otherwise suggested that increased risk of torsion and removing it would certainly be recommended also.  She would like to have it removed.    Examination:  She is pleasant appropriate no apparent distress  External genitalia are normal,  Vaginal mucosa is normal,  Cervix is without lesion,      Patient was consented for endometrial sample.  Endometrial sample was done in the usual sterile fashion.  Pipelle used to sample the cervix measuring up to approximately 7 cm from the cervical os.  Moderate amount of tissue recovered.  Patient did have small amount of cramping and requested ibuprofen which was provided for 600 mg.  She had minimal  spotting.    Assessment:  Patient with to GYN issues of increased endometrial cells on Pap smear as well as a 7 cm left ovarian cyst    Plan:  Endometrial biopsy is pending as is an ECC  Orders have been placed for laparoscopic left ovarian cystectomy/left oophorectomy and bilateral salpingectomy.        No LMP recorded. (Menstrual status: Birth Control)..     Patient is sexually active, .  Using oral contraceptives for contraception.    reports that she has never smoked. She has never been exposed to tobacco smoke. She has never used smokeless tobacco.    STD testing offered?  Declined    Health maintenance updated:  no    Today's PHQ-2 Score:       2024     1:09 PM   PHQ-2 (  [employer])   Q1: Little interest or pleasure in doing things 0   Q2: Feeling down, depressed or hopeless 0   PHQ-2 Score 0     Today's PHQ-9 Score:       2024     1:25 PM   PHQ-9 SCORE   PHQ-9 Total Score MyChart Incomplete     Today's JANIS-7 Score:       2024     1:25 PM   JANIS-7 SCORE   Total Score 17 (severe anxiety)   Total Score 17       Problem list and histories reviewed & adjusted, as indicated.  Additional history: as documented.    Patient Active Problem List   Diagnosis    Regional enteritis of small intestine    CARDIOVASCULAR SCREENING; LDL GOAL LESS THAN 160    Impaired fasting glucose    Class 2 severe obesity due to excess calories with serious comorbidity and body mass index (BMI) of 37.0 to 37.9 in adult (H)    Regional enteritis (H)    Major depressive disorder, recurrent episode, moderate (H)    History of thyroid cancer    Postsurgical hypothyroidism    Generalized anxiety disorder    Immunosuppressed status (H24)    Screening for cervical cancer    Prediabetes     Past Surgical History:   Procedure Laterality Date    BIOPSY      CHOLECYSTECTOMY      COLONOSCOPY      ENT SURGERY  2015    total thyroidectomy    THYROIDECTOMY N/A 2015    Procedure: THYROIDECTOMY;  Surgeon: Shari King MD;   Location:  OR    [facility] NONSPECIFIC PROCEDURE  01/01/2000    s/p cholecystectomy      Social History     Tobacco Use    Smoking status: Never     Passive exposure: Never    Smokeless tobacco: Never   Substance Use Topics    Alcohol use: Yes     Comment: couple of times a year      Problem (# of Occurrences) Relation (Name,Age of Onset)    Substance Abuse (1) Father (Maxx): And mother    Cardiovascular (1) Mother (Briana)    Depression (1) Mother (Briana)    Diabetes (1) Paternal Grandmother    Gastrointestinal Disease (1) Mother (Briana): diverticulitis    Hypertension (2) Mother (Briana), Father (Maxx)    Cerebrovascular Disease (1) Paternal Grandmother    Coronary Artery Disease (1) Mother (Briana)    Cervical Cancer (1) Mother (Briana)    Hypothyroidism (1) Mother (Briana)              Current Outpatient Medications   Medication Sig Dispense Refill    ALPRAZolam (XANAX) 0.25 MG tablet Take 1-2 tablets (0.25-0.5 mg) by mouth 2 times daily as needed for anxiety (anxiety due to flying) 12 tablet 0    buPROPion (WELLBUTRIN XL) 150 MG 24 hr tablet Take 3 tablets (450 mg) by mouth every morning 270 tablet 3    busPIRone HCl (BUSPAR) 30 MG tablet Take 1 tablet (30 mg) by mouth 2 times daily 180 tablet 1    Cyanocobalamin (VITAMIN B 12 PO) Take 1 tablet by mouth daily      drospirenone-ethinyl estradiol (KANA) 3-0.03 MG tablet Take 1 tablet by mouth daily Take active pill daily 84 tablet 0    Ferrous Gluconate 324 (37.5 Fe) MG TABS Take 1 tablet by mouth three times a week      FLUoxetine (PROZAC) 40 MG capsule Take 2 capsules (80 mg) by mouth daily 180 capsule 3    inFLIXimab (REMICADE) 100 MG injection Inject 3 mg/kg into the vein      LANsoprazole (PREVACID) 30 MG DR capsule Take 1 capsule (30 mg) by mouth daily 90 capsule 3    levothyroxine (SYNTHROID/LEVOTHROID) 200 MCG tablet Take 200 mcg 6 days a week and skip X1 day a week. 90 tablet 1    metFORMIN (GLUCOPHAGE XR) 500 MG 24 hr tablet Take 1 tab daily in  am for 1 wk then 2 tab daily in am for 1 wk then 3 tab daily in am for 1 wk then 4 tab daily in am 360 tablet 1    propranolol ER (INDERAL LA) 60 MG 24 hr capsule Take 1 capsule (60 mg) by mouth daily 90 capsule 3    tirzepatide-Weight Management (ZEPBOUND) 2.5 MG/0.5ML prefilled pen Inject 0.5 mLs (2.5 mg) Subcutaneous every 7 days 6 mL 1    traZODone (DESYREL) 100 MG tablet TAKE ONE TO FOUR TABLETS (100MG TO 400MG) BY MOUTH AT BEDTIME 360 tablet 3    VITAMIN D PO Take by mouth daily       No current facility-administered medications for this visit.     Allergies   Allergen Reactions    Azathioprine     Ciprofloxacin Nausea and Vomiting    Sulfamethoxazole W/Trimethoprim            OBJECTIVE:     There were no vitals taken for this visit.  There is no height or weight on file to calculate BMI.    Exam:  See above    In-Clinic Test Results:  No results found for this or any previous visit (from the past 24 hour(s)).    ASSESSMENT/PLAN:                                                      See above    Min Ferrari MD  [facility] [place]  INDICATIONS:                                                    Is a pregnancy test required: No.  Was a consent obtained?  Yes    Having endometrial biopsy for abnormal PAP smear    Today's PHQ-2 Score:       1/9/2024     1:09 PM   PHQ-2 ( 1999 [employer])   Q1: Little interest or pleasure in doing things 0   Q2: Feeling down, depressed or hopeless 0   PHQ-2 Score 0       PROCEDURE;                                                      A speculum was placed in the vagina and cervix prepped with betadine. A tenaculum was attached to the cervix. A small plastic 5 mm Pipelle syringe curette was inserted into the cervical canal. The uterus was sounded to 7 cm's. A vigorous four quadrant biopsy was performed, removing amount moderate  of tissue. The speculum was removed. This tissue was placed in Formalin and sent to pathology.    The patient tolerated the  procedure  well and she reported there was  cramping.      POST PROCEDURE;                                                      There  was  cramping at the time of discharge. She  tolerated the procedure well. There were no complications. Patient was discharged in stable condition.    Patient advised to call the clinic if severe pelvic pain, fever or heavy bleeding.    Mni Ferrari MD

## 2024-09-25 ENCOUNTER — OFFICE VISIT (OUTPATIENT)
Dept: OBGYN | Facility: CLINIC | Age: 54
End: 2024-09-25
Attending: INTERNAL MEDICINE
Payer: COMMERCIAL

## 2024-09-25 ENCOUNTER — TELEPHONE (OUTPATIENT)
Dept: ENDOCRINOLOGY | Facility: CLINIC | Age: 54
End: 2024-09-25

## 2024-09-25 VITALS — DIASTOLIC BLOOD PRESSURE: 72 MMHG | BODY MASS INDEX: 34.77 KG/M2 | WEIGHT: 184 LBS | SYSTOLIC BLOOD PRESSURE: 120 MMHG

## 2024-09-25 DIAGNOSIS — E89.0 S/P TOTAL THYROIDECTOMY: ICD-10-CM

## 2024-09-25 DIAGNOSIS — C73 THYROID CANCER (H): ICD-10-CM

## 2024-09-25 DIAGNOSIS — R87.619 ENDOMETRIAL CELLS ON CERVICAL PAP SMEAR INCONSISTENT W/LMP: ICD-10-CM

## 2024-09-25 DIAGNOSIS — R93.89 ABNORMAL PELVIC ULTRASOUND: ICD-10-CM

## 2024-09-25 DIAGNOSIS — N83.202 LEFT OVARIAN CYST: Primary | ICD-10-CM

## 2024-09-25 PROCEDURE — 99203 OFFICE O/P NEW LOW 30 MIN: CPT | Mod: 25 | Performed by: OBSTETRICS & GYNECOLOGY

## 2024-09-25 PROCEDURE — 57505 ENDOCERVICAL CURETTAGE: CPT | Performed by: OBSTETRICS & GYNECOLOGY

## 2024-09-25 PROCEDURE — 88305 TISSUE EXAM BY PATHOLOGIST: CPT | Performed by: PATHOLOGY

## 2024-09-25 RX ORDER — ACETAMINOPHEN 325 MG/1
975 TABLET ORAL ONCE
OUTPATIENT
Start: 2024-09-25 | End: 2024-09-25

## 2024-09-25 NOTE — NURSING NOTE
"Chief Complaint   Patient presents with    Biopsy       Initial /72   Wt 83.5 kg (184 lb)   BMI 34.77 kg/m   Estimated body mass index is 34.77 kg/m  as calculated from the following:    Height as of 24: 1.549 m (5' 1\").    Weight as of this encounter: 83.5 kg (184 lb).  BP completed using cuff size: regular    Questioned patient about current smoking habits.  Pt. has never smoked.          The following HM Due: NONE      Emily Bal LPN on 2024 at 3:14 PM           "

## 2024-09-26 ENCOUNTER — TELEPHONE (OUTPATIENT)
Dept: OBGYN | Facility: CLINIC | Age: 54
End: 2024-09-26
Payer: COMMERCIAL

## 2024-09-26 NOTE — TELEPHONE ENCOUNTER
Patient Name: Meeta Rowell   MRN: [id number]   Case#: [id number]   Surgeons and Role:      * Alia Gale MD - Primary   Date requested: * No dates entered *   Location: [facility] OR   Procedure(s):   LAPAROSCOPY, left oophrectomy, bilateral salpingectomy (Left)

## 2024-09-26 NOTE — TELEPHONE ENCOUNTER
Type of surgery: laparoscopy, left oophorectomy, bilateral salpingectomy  Location of surgery: [facility]  Date and time of surgery: 10/29/24 @ 9:05 am  Surgeon: Dr. Ferrari  Pre-Op Appt Date: Patient advised to schedule.  Post-Op Appt Date: 11/12/24   Packet sent out: Yes  Pre-cert/Authorization completed:  No  Date: 9/26/24

## 2024-09-27 ENCOUNTER — MYC REFILL (OUTPATIENT)
Dept: PEDIATRICS | Facility: CLINIC | Age: 54
End: 2024-09-27
Payer: COMMERCIAL

## 2024-09-27 DIAGNOSIS — F40.243 FEAR OF FLYING: ICD-10-CM

## 2024-09-27 DIAGNOSIS — F41.1 GENERALIZED ANXIETY DISORDER: Primary | ICD-10-CM

## 2024-09-27 DIAGNOSIS — F41.0 PANIC ATTACK: ICD-10-CM

## 2024-09-27 LAB
PATH REPORT.COMMENTS IMP SPEC: NORMAL
PATH REPORT.COMMENTS IMP SPEC: NORMAL
PATH REPORT.FINAL DX SPEC: NORMAL
PATH REPORT.GROSS SPEC: NORMAL
PATH REPORT.MICROSCOPIC SPEC OTHER STN: NORMAL
PATH REPORT.RELEVANT HX SPEC: NORMAL
PHOTO IMAGE: NORMAL

## 2024-09-27 NOTE — TELEPHONE ENCOUNTER
[facility] Dr[facility] Don[facility] labs prior for refills.

## 2024-09-30 RX ORDER — ALPRAZOLAM 0.25 MG
.25-.5 TABLET ORAL 2 TIMES DAILY PRN
Qty: 8 TABLET | Refills: 0 | Status: SHIPPED | OUTPATIENT
Start: 2024-09-30

## 2024-10-01 RX ORDER — LEVOTHYROXINE SODIUM 200 UG/1
TABLET ORAL
Qty: 90 TABLET | Refills: 1 | OUTPATIENT
Start: 2024-10-01

## 2024-10-15 ENCOUNTER — OFFICE VISIT (OUTPATIENT)
Dept: PEDIATRICS | Facility: CLINIC | Age: 54
End: 2024-10-15
Payer: COMMERCIAL

## 2024-10-15 VITALS
TEMPERATURE: 97.5 F | HEIGHT: 61 IN | BODY MASS INDEX: 35.7 KG/M2 | DIASTOLIC BLOOD PRESSURE: 76 MMHG | RESPIRATION RATE: 20 BRPM | HEART RATE: 59 BPM | SYSTOLIC BLOOD PRESSURE: 124 MMHG | OXYGEN SATURATION: 96 % | WEIGHT: 189.1 LBS

## 2024-10-15 DIAGNOSIS — K50.90 CROHN'S DISEASE WITHOUT COMPLICATION, UNSPECIFIED GASTROINTESTINAL TRACT LOCATION (H): ICD-10-CM

## 2024-10-15 DIAGNOSIS — F33.1 MODERATE RECURRENT MAJOR DEPRESSION (H): ICD-10-CM

## 2024-10-15 DIAGNOSIS — N85.8 UTERINE MASS: ICD-10-CM

## 2024-10-15 DIAGNOSIS — D84.9 IMMUNOSUPPRESSED STATUS (H): ICD-10-CM

## 2024-10-15 DIAGNOSIS — R73.01 IMPAIRED FASTING GLUCOSE: ICD-10-CM

## 2024-10-15 DIAGNOSIS — Z01.818 PREOP GENERAL PHYSICAL EXAM: Primary | ICD-10-CM

## 2024-10-15 PROCEDURE — 99214 OFFICE O/P EST MOD 30 MIN: CPT | Performed by: INTERNAL MEDICINE

## 2024-10-15 PROCEDURE — G2211 COMPLEX E/M VISIT ADD ON: HCPCS | Performed by: INTERNAL MEDICINE

## 2024-10-15 ASSESSMENT — PAIN SCALES - GENERAL: PAINLEVEL: NO PAIN (0)

## 2024-10-15 ASSESSMENT — PATIENT HEALTH QUESTIONNAIRE - PHQ9
SUM OF ALL RESPONSES TO PHQ QUESTIONS 1-9: 8
SUM OF ALL RESPONSES TO PHQ QUESTIONS 1-9: 8
10. IF YOU CHECKED OFF ANY PROBLEMS, HOW DIFFICULT HAVE THESE PROBLEMS MADE IT FOR YOU TO DO YOUR WORK, TAKE CARE OF THINGS AT HOME, OR GET ALONG WITH OTHER PEOPLE: SOMEWHAT DIFFICULT

## 2024-10-15 NOTE — PATIENT INSTRUCTIONS
Get the novavax covid vaccine at the [place] pharmacy.  [email or web address]      How to Take Your Medication Before Surgery  Preoperative Medication Instructions   Antiplatelet or Anticoagulation Medication Instructions   - Patient is on no antiplatelet or anticoagulation medications.    Additional Medication Instructions   - metformin: DO NOT TAKE day of surgery.     Do not take aspirin, fish oil, gingko, ginseng or vitamin E for 1wk before surgery.  Do not take ibuprofen, advil, motrin, naprosyn, aleve for 5d before surgery.  Tylenol is safe to take.       Patient Education   Preparing for Your Surgery  For Adults  Getting started  In most cases, a nurse will call to review your health history and instructions. They will give you an arrival time based on your scheduled surgery time. Please be ready to share:  Your doctor's clinic name and phone number  Your medical, surgical, and anesthesia history  A list of allergies and sensitivities  A list of medicines, including herbal treatments and over-the-counter drugs  Whether the patient has a legal guardian (ask how to send us the papers in advance)  Note: You may not receive a call if you were seen at our PAC (Preoperative Assessment Center).  Please tell us if you're pregnant--or if there's any chance you might be pregnant. Some surgeries may injure a fetus (unborn baby), so they require a pregnancy test. Surgeries that are safe for a fetus don't always need a test, and you can choose whether to have one.   Preparing for surgery  Within 10 to 30 days of surgery: Have a pre-op exam (sometimes called an H&P, or History and Physical). This can be done at a clinic or pre-operative center.  If you're having a , you may not need this exam. Talk to your care team.  At your pre-op exam, talk to your care team about all medicines you take. (This includes CBD oil and any drugs, such as THC, marijuana, and other forms of cannabis.) If you need to stop any  medicine before surgery, ask when to start taking it again.  This is for your safety. Many medicines and drugs can make you bleed too much during surgery. Some change how well surgery (anesthesia) drugs work.  Call your insurance company to let them know you're having surgery. (If you don't have insurance, call [phone].)  Call your clinic if there's any change in your health. This includes a scrape or scratch near the surgery site, or any signs of a cold (sore throat, runny nose, cough, rash, fever).  Eating and drinking guidelines  For your safety: Unless your surgeon tells you otherwise, follow the guidelines below.  Eat and drink as normal until 8 hours before you arrive for surgery. After that, no food or milk. You can spit out gum when you arrive.  Drink clear liquids until 2 hours before you arrive. These are liquids you can see through, like water, Gatorade, and Propel Water. They also include plain black coffee and tea (no cream or milk).  No alcohol for 24 hours before you arrive. The night before surgery, stop any drinks that contain THC.  If your care team tells you to take medicine on the morning of surgery, it's okay to take it with a sip of water. No other medicines or drugs are allowed (including CBD oil)--follow your care team's instructions.  If you have questions the day of surgery, call your hospital or surgery center.   Preventing infection  Shower or bathe the night before and the morning of surgery. Follow the instructions your clinic gave you. (If no instructions, use regular soap.)  Don't shave or clip hair near your surgery site. We'll remove the hair if needed.  Don't smoke or vape the morning of surgery. No chewing tobacco for 6 hours before you arrive. A nicotine patch is okay. You may spit out nicotine gum when you arrive.  For some surgeries, the surgeon will tell you to fully quit smoking and nicotine.  We will make every effort to keep you safe from infection. We will:  Clean our  hands often with soap and water (or an alcohol-based hand rub).  Clean the skin at your surgery site with a special soap that kills germs.  Give you a special gown to keep you warm. (Cold raises the risk of infection.)  Wear hair covers, masks, gowns, and gloves during surgery.  Give antibiotic medicine, if prescribed. Not all surgeries need this medicine.  What to bring on the day of surgery  Photo ID and insurance card  Copy of your health care directive, if you have one  Glasses and hearing aids (bring cases)  You can't wear contacts during surgery  Inhaler and eye drops, if you use them (tell us about these when you arrive)  CPAP machine or breathing device, if you use them  A few personal items, if spending the night  If you have . . .  A pacemaker, ICD (cardiac defibrillator), or other implant: Bring the ID card.  An implanted stimulator: Bring the remote control.  A legal guardian: Bring a copy of the certified (court-stamped) guardianship papers.  Please remove any jewelry, including body piercings. Leave jewelry and other valuables at home.  If you're going home the day of surgery  You must have a responsible adult drive you home. They should stay with you overnight as well.  If you don't have someone to stay with you, and you aren't safe to go home alone, we may keep you overnight. Insurance often won't pay for this.  After surgery  If it's hard to control your pain or you need more pain medicine, please call your surgeon's office.  Questions?   If you have any questions for your care team, list them here:   ____________________________________________________________________________________________________________________________________________________________________________________________________________________________________________________________  For informational purposes only. Not to replace the advice of your health care provider. Copyright   2003, 2019 [facility]. All rights  reserved. Clinically reviewed by Jc Green MD. [employer] [id number] - REV 08/24.

## 2024-10-15 NOTE — PROGRESS NOTES
Preoperative Evaluation  [facility] [place]  [address]  [address]  [place] [address]  Phone: [phone]  Fax: [phone]  Primary Provider: Jesenia Weinberg MD  Pre-op Performing Provider: Jesenia Weinberg MD  Oct 15, 2024             10/15/2024   Surgical Information   What procedure is being done? removal of mass in uterus   Facility or Hospital where procedure/surgery will be performed: [facility]   Who is doing the procedure / surgery?    Date of surgery / procedure: October 29,2024   Time of surgery / procedure: 9:00 am   Where do you plan to recover after surgery? at home with family        Fax number for surgical facility: Note does not need to be faxed, will be available electronically in Epic.    Assessment & Plan     The proposed surgical procedure is considered LOW risk.    Preop general physical exam  Uterine mass  Proceed with surgery as scheduled    Moderate recurrent major depression (H)  Stable, continue medication.  Anxiety flared with planned surgery but doing better    Impaired fasting glucose  Continue metformin.  Hold day of surgery.  Zepbound not covered.    Immunosuppressed status (H)  Encouraged vaccination - will do at pharmacy    Crohn's disease without complication, unspecified gastrointestinal tract location (H)  Controlled on remicade.  Normal labs 4 months ago so will not recheck today            - No identified additional risk factors other than previously addressed    Preoperative Medication Instructions  Antiplatelet or Anticoagulation Medication Instructions   - Patient is on no antiplatelet or anticoagulation medications.    Additional Medication Instructions   - metformin: DO NOT TAKE day of surgery.    Recommendation  Approval given to proceed with proposed procedure, without further diagnostic evaluation.    Luz Elena Tim is a 53 year old, presenting for the following:  Pre-Op Exam          10/15/2024     4:11 PM   Additional  Questions   Roomed by John ORELLANA   Accompanied by N/A         10/15/2024     4:11 PM   Patient Reported Additional Medications   Patient reports taking the following new medications No     HPI related to upcoming procedure: ovarian and uterine masses requiring removal        10/15/2024   Pre-Op Questionnaire   Have you ever had a heart attack or stroke? No   Have you ever had surgery on your heart or blood vessels, such as a stent placement, a coronary artery bypass, or surgery on an artery in your head, neck, heart, or legs? No   Do you have chest pain with activity? No   Do you have a history of heart failure? No   Do you currently have a cold, bronchitis or symptoms of other infection? No   Do you have a cough, shortness of breath, or wheezing? No   Do you or anyone in your family have previous history of blood clots? No   Do you or does anyone in your family have a serious bleeding problem such as prolonged bleeding following surgeries or cuts? No   Have you ever had problems with anemia or been told to take iron pills? (!) YES normal 6/24   Have you had any abnormal blood loss such as black, tarry or bloody stools, or abnormal vaginal bleeding? No   Have you ever had a blood transfusion? No   Are you willing to have a blood transfusion if it is medically needed before, during, or after your surgery? Yes   Have you or any of your relatives ever had problems with anesthesia? No   Do you have sleep apnea, excessive snoring or daytime drowsiness? No   Do you have any artifical heart valves or other implanted medical devices like a pacemaker, defibrillator, or continuous glucose monitor? No   Do you have artificial joints? No   Are you allergic to latex? No    Colitis - on remicade and controlled.      Health Care Directive  Patient does not have a Health Care Directive or Living Will: Discussed advance care planning with patient; information given to patient to review.    Preoperative Review of    reviewed -  controlled substances reflected in medication list.  {Review [place]MP for all patients per [facility] [place] Profile - has most of last alprazolam.  Anxiety increased with surgery        Patient Active Problem List    Diagnosis Date Noted    Prediabetes 06/14/2024     Priority: Medium    Screening for cervical cancer 03/02/2021     Priority: Medium     Patient with Immunosuppressed status-   Although the literature for other immunosuppressed populations remains limited, these other conditions that suppress cell-mediated immunity have also been associated with virally induced cancers, including cervical cancer. Therefore, cervical cancer screening and abnormal result management recommendations for immunocompromised individuals without HIV use the guidelines developed for people living with HIV: screening should begin within 1 year of first insertional sexual activity and continue throughout a patient's lifetime: annually for 3 years, then every 3 years (cytology only) until the age of 30 years, and then either continuing with cytology alone or cotesting every 3 years after the age of 30 years.  02/23/21 NIL Pap, Neg HR HPV. Plan 3 year cotest's.   6/14/24 NIL Pap, Neg HPV with endometrial cells.       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 No abnormal  activity to suggest  metastatic thyroid cancer.  Follow up US 6/2016- no mets  -- TG appear stable  -- [facility] 8/2018: No abnormal activity to suggest metastatic thyroid cancer.    >>OVERVIEW FOR PAPILLARY CARCINOMA IN SITU WRITTEN ON 9/4/2018  8:18 AM BY EDELMIRA PUGH MD    Four foci on thyroid pathology      Regional enteritis (H) 10/14/2015     Priority: Medium     Overview:   Followed by [facility], Remicade since 2003      Class 2 severe obesity due to excess calories with serious comorbidity and body mass index (BMI) of 37.0 to 37.9 in adult (H) 10/08/2013     Priority: Medium    Impaired fasting glucose 07/27/2010     Priority: Medium    CARDIOVASCULAR SCREENING; LDL GOAL LESS THAN 160 02/10/2010     Priority: Medium    Major depressive disorder, recurrent episode, moderate (H) 07/14/2009     Priority: Medium    Regional enteritis of small intestine 04/08/2003     Priority: Medium     Crohns disease, well controlled on remicade.  Follows with [facility].        Past Medical History:   Diagnosis Date    Cancer (H) 12/11/2015    thyroid    Crohn's disease (H)     Depression     Depressive disorder     Endometriosis     GERD (gastroesophageal reflux disease)     Personal history of other genital system and obstetric disorders(V13.29)     Regional enteritis of small intestine (H) 01/01/2003    ileal disease    Thyroid disease     hypo     Past Surgical History:   Procedure Laterality Date    BIOPSY      CHOLECYSTECTOMY      COLONOSCOPY      ENT SURGERY  12/01/2015    total thyroidectomy    THYROIDECTOMY N/A 12/11/2015    Procedure: THYROIDECTOMY;  Surgeon: Shari King MD;  Location:  OR    [facility] NONSPECIFIC PROCEDURE  01/01/2000    s/p cholecystectomy     Current Outpatient Medications   Medication Sig Dispense Refill    ALPRAZolam (XANAX) 0.25 MG tablet Take 1-2 tablets (0.25-0.5 mg) by mouth 2 times daily as needed for anxiety (anxiety due to flying). 8 tablet 0    buPROPion (WELLBUTRIN XL) 150 MG 24 hr tablet  Take 3 tablets (450 mg) by mouth every morning 270 tablet 3    busPIRone HCl (BUSPAR) 30 MG tablet Take 1 tablet (30 mg) by mouth 2 times daily 180 tablet 1    Cyanocobalamin (VITAMIN B 12 PO) Take 1 tablet by mouth daily      Ferrous Gluconate 324 (37.5 Fe) MG TABS Take 1 tablet by mouth three times a week      FLUoxetine (PROZAC) 40 MG capsule Take 2 capsules (80 mg) by mouth daily 180 capsule 3    inFLIXimab (REMICADE) 100 MG injection Inject 3 mg/kg into the vein      LANsoprazole (PREVACID) 30 MG DR capsule Take 1 capsule (30 mg) by mouth daily 90 capsule 3    levothyroxine (SYNTHROID/LEVOTHROID) 200 MCG tablet Take 200 mcg 6 days a week and skip X1 day a week. 90 tablet 1    metFORMIN (GLUCOPHAGE XR) 500 MG 24 hr tablet Take 1 tab daily in am for 1 wk then 2 tab daily in am for 1 wk then 3 tab daily in am for 1 wk then 4 tab daily in am 360 tablet 1    propranolol ER (INDERAL LA) 60 MG 24 hr capsule Take 1 capsule (60 mg) by mouth daily 90 capsule 3    traZODone (DESYREL) 100 MG tablet TAKE ONE TO FOUR TABLETS (100MG TO 400MG) BY MOUTH AT BEDTIME 360 tablet 3    VITAMIN D PO Take by mouth daily      drospirenone-ethinyl estradiol (KANA) 3-0.03 MG tablet Take 1 tablet by mouth daily Take active pill daily (Patient not taking: Reported on 10/15/2024) 84 tablet 0    tirzepatide-Weight Management (ZEPBOUND) 2.5 MG/0.5ML prefilled pen Inject 0.5 mLs (2.5 mg) Subcutaneous every 7 days (Patient not taking: Reported on 10/15/2024) 6 mL 1       Allergies   Allergen Reactions    Azathioprine     Ciprofloxacin Nausea and Vomiting    Sulfamethoxazole W/Trimethoprim         Social History     Tobacco Use    Smoking status: Never     Passive exposure: Never    Smokeless tobacco: Never   Substance Use Topics    Alcohol use: Yes     Comment: couple of times a year       History   Drug Use No             Review of Systems  Constitutional, HEENT, cardiovascular, pulmonary, gi and gu systems are negative, except as otherwise  "noted.    Objective    /76 (BP Location: Right arm, Patient Position: Sitting, Cuff Size: Adult Large)   Pulse 59   Temp 97.5  F (36.4  C) (Temporal)   Resp 20   Ht 1.549 m (5' 1\")   Wt 85.8 kg (189 lb 1.6 oz)   SpO2 96%   BMI 35.73 kg/m     Estimated body mass index is 35.73 kg/m  as calculated from the following:    Height as of this encounter: 1.549 m (5' 1\").    Weight as of this encounter: 85.8 kg (189 lb 1.6 oz).  Physical Exam  GENERAL: alert and no distress  EYES: Eyes grossly normal to inspection, PERRL and conjunctivae and sclerae normal  HENT: normal cephalic/atraumatic, oropharynx clear, and oral mucous membranes moist  NECK: no adenopathy, no asymmetry, masses, or scars  RESP: lungs clear to auscultation - no rales, rhonchi or wheezes  CV: regular rate and rhythm, normal S1 S2, no S3 or S4, no murmur, click or rub, no peripheral edema  ABDOMEN: soft, nontender, no hepatosplenomegaly, no masses and bowel sounds normal  MS: no gross musculoskeletal defects noted, no edema  SKIN: no suspicious lesions or rashes  NEURO: Normal strength and tone, mentation intact and speech normal  PSYCH: mentation appears normal, affect normal/bright    Recent Labs   Lab Test 06/14/24  1515   HGB 13.9         POTASSIUM 4.3   CR 0.89   A1C 5.4        Diagnostics  No labs were ordered during this visit.   No EKG required, no history of coronary heart disease, significant arrhythmia, peripheral arterial disease or other structural heart disease.    Revised Cardiac Risk Index (RCRI)  The patient has the following serious cardiovascular risks for perioperative complications:   - No serious cardiac risks = 0 points     RCRI Interpretation: 0 points: Class I (very low risk - 0.4% complication rate)     The longitudinal plan of care for the diagnosis(es)/condition(s) as documented were addressed during this visit. Due to the added complexity in care, I will continue to support Meeta in the subsequent " management and with ongoing continuity of care.    Signed Electronically by: Jesenia Weinberg MD  A copy of this evaluation report is provided to the requesting physician.        Answers submitted by the patient for this visit:  Patient Health Questionnaire (Submitted on 10/15/2024)  If you checked off any problems, how difficult have these problems made it for you to do your work, take care of things at home, or get along with other people?: Somewhat difficult  PHQ9 TOTAL SCORE: 8

## 2024-10-22 RX ORDER — FERROUS SULFATE 324(65)MG
TABLET, DELAYED RELEASE (ENTERIC COATED) ORAL
COMMUNITY
Start: 2023-10-06

## 2024-10-23 ENCOUNTER — LAB (OUTPATIENT)
Dept: LAB | Facility: CLINIC | Age: 54
End: 2024-10-23
Payer: COMMERCIAL

## 2024-10-23 DIAGNOSIS — D09.9 PAPILLARY CARCINOMA IN SITU: ICD-10-CM

## 2024-10-23 DIAGNOSIS — E89.0 POSTSURGICAL HYPOTHYROIDISM: ICD-10-CM

## 2024-10-23 DIAGNOSIS — C73 THYROID CANCER (H): ICD-10-CM

## 2024-10-23 DIAGNOSIS — E89.0 S/P TOTAL THYROIDECTOMY: ICD-10-CM

## 2024-10-23 LAB
T4 FREE SERPL-MCNC: 1.79 NG/DL (ref 0.9–1.7)
TSH SERPL DL<=0.005 MIU/L-ACNC: 0.13 UIU/ML (ref 0.3–4.2)

## 2024-10-23 PROCEDURE — 84443 ASSAY THYROID STIM HORMONE: CPT

## 2024-10-23 PROCEDURE — 36415 COLL VENOUS BLD VENIPUNCTURE: CPT

## 2024-10-23 PROCEDURE — 84439 ASSAY OF FREE THYROXINE: CPT

## 2024-10-29 ENCOUNTER — ANESTHESIA EVENT (OUTPATIENT)
Dept: SURGERY | Facility: CLINIC | Age: 54
End: 2024-10-29
Payer: COMMERCIAL

## 2024-10-29 ENCOUNTER — ANESTHESIA (OUTPATIENT)
Dept: SURGERY | Facility: CLINIC | Age: 54
End: 2024-10-29
Payer: COMMERCIAL

## 2024-10-29 ENCOUNTER — HOSPITAL ENCOUNTER (OUTPATIENT)
Facility: CLINIC | Age: 54
Discharge: HOME | End: 2024-10-29
Attending: OBSTETRICS & GYNECOLOGY | Admitting: OBSTETRICS & GYNECOLOGY
Payer: COMMERCIAL

## 2024-10-29 VITALS
BODY MASS INDEX: 35.92 KG/M2 | HEART RATE: 65 BPM | TEMPERATURE: 97.9 F | DIASTOLIC BLOOD PRESSURE: 81 MMHG | OXYGEN SATURATION: 93 % | SYSTOLIC BLOOD PRESSURE: 144 MMHG | RESPIRATION RATE: 15 BRPM | WEIGHT: 190.1 LBS

## 2024-10-29 DIAGNOSIS — N83.202 LEFT OVARIAN CYST: ICD-10-CM

## 2024-10-29 DIAGNOSIS — G89.18 POST-OP PAIN: Primary | ICD-10-CM

## 2024-10-29 LAB
ABO/RH(D): NORMAL
ANTIBODY SCREEN: NEGATIVE
BLOOD BANK CHART COMMENT: NORMAL
HCG UR QL: NEGATIVE
HGB BLD-MCNC: 13.5 G/DL (ref 11.7–15.7)
SPECIMEN EXPIRATION DATE: NORMAL

## 2024-10-29 PROCEDURE — 86901 BLOOD TYPING SEROLOGIC RH(D): CPT | Performed by: OBSTETRICS & GYNECOLOGY

## 2024-10-29 PROCEDURE — 999N000141 HC STATISTIC PRE-PROCEDURE NURSING ASSESSMENT: Performed by: OBSTETRICS & GYNECOLOGY

## 2024-10-29 PROCEDURE — 36415 COLL VENOUS BLD VENIPUNCTURE: CPT | Performed by: OBSTETRICS & GYNECOLOGY

## 2024-10-29 PROCEDURE — 250N000011 HC RX IP 250 OP 636: Performed by: OBSTETRICS & GYNECOLOGY

## 2024-10-29 PROCEDURE — 88305 TISSUE EXAM BY PATHOLOGIST: CPT | Mod: TC | Performed by: OBSTETRICS & GYNECOLOGY

## 2024-10-29 PROCEDURE — 370N000017 HC ANESTHESIA TECHNICAL FEE, PER MIN: Performed by: OBSTETRICS & GYNECOLOGY

## 2024-10-29 PROCEDURE — 88305 TISSUE EXAM BY PATHOLOGIST: CPT | Mod: 26 | Performed by: STUDENT IN AN ORGANIZED HEALTH CARE EDUCATION/TRAINING PROGRAM

## 2024-10-29 PROCEDURE — 360N000076 HC SURGERY LEVEL 3, PER MIN: Performed by: OBSTETRICS & GYNECOLOGY

## 2024-10-29 PROCEDURE — 88302 TISSUE EXAM BY PATHOLOGIST: CPT | Mod: 26 | Performed by: STUDENT IN AN ORGANIZED HEALTH CARE EDUCATION/TRAINING PROGRAM

## 2024-10-29 PROCEDURE — 58661 LAPAROSCOPY REMOVE ADNEXA: CPT | Mod: 50 | Performed by: STUDENT IN AN ORGANIZED HEALTH CARE EDUCATION/TRAINING PROGRAM

## 2024-10-29 PROCEDURE — 250N000013 HC RX MED GY IP 250 OP 250 PS 637: Performed by: OBSTETRICS & GYNECOLOGY

## 2024-10-29 PROCEDURE — 85018 HEMOGLOBIN: CPT | Performed by: OBSTETRICS & GYNECOLOGY

## 2024-10-29 PROCEDURE — 272N000001 HC OR GENERAL SUPPLY STERILE: Performed by: OBSTETRICS & GYNECOLOGY

## 2024-10-29 PROCEDURE — 81025 URINE PREGNANCY TEST: CPT | Performed by: OBSTETRICS & GYNECOLOGY

## 2024-10-29 PROCEDURE — 710N000012 HC RECOVERY PHASE 2, PER MINUTE: Performed by: OBSTETRICS & GYNECOLOGY

## 2024-10-29 PROCEDURE — 250N000011 HC RX IP 250 OP 636: Performed by: ANESTHESIOLOGY

## 2024-10-29 PROCEDURE — 86900 BLOOD TYPING SEROLOGIC ABO: CPT | Performed by: OBSTETRICS & GYNECOLOGY

## 2024-10-29 PROCEDURE — 258N000003 HC RX IP 258 OP 636: Performed by: NURSE ANESTHETIST, CERTIFIED REGISTERED

## 2024-10-29 PROCEDURE — 250N000011 HC RX IP 250 OP 636: Performed by: NURSE ANESTHETIST, CERTIFIED REGISTERED

## 2024-10-29 PROCEDURE — 250N000009 HC RX 250: Performed by: NURSE ANESTHETIST, CERTIFIED REGISTERED

## 2024-10-29 PROCEDURE — 258N000001 HC RX 258: Performed by: OBSTETRICS & GYNECOLOGY

## 2024-10-29 PROCEDURE — 710N000009 HC RECOVERY PHASE 1, LEVEL 1, PER MIN: Performed by: OBSTETRICS & GYNECOLOGY

## 2024-10-29 PROCEDURE — 58661 LAPAROSCOPY REMOVE ADNEXA: CPT | Mod: 50 | Performed by: OBSTETRICS & GYNECOLOGY

## 2024-10-29 RX ORDER — LIDOCAINE HYDROCHLORIDE 20 MG/ML
INJECTION, SOLUTION INFILTRATION; PERINEURAL PRN
Status: DISCONTINUED | OUTPATIENT
Start: 2024-10-29 | End: 2024-10-29

## 2024-10-29 RX ORDER — METHADONE HYDROCHLORIDE 10 MG/ML
INJECTION, SOLUTION INTRAMUSCULAR; INTRAVENOUS; SUBCUTANEOUS PRN
Status: DISCONTINUED | OUTPATIENT
Start: 2024-10-29 | End: 2024-10-29

## 2024-10-29 RX ORDER — DEXAMETHASONE SODIUM PHOSPHATE 4 MG/ML
4 INJECTION, SOLUTION INTRA-ARTICULAR; INTRALESIONAL; INTRAMUSCULAR; INTRAVENOUS; SOFT TISSUE
Status: DISCONTINUED | OUTPATIENT
Start: 2024-10-29 | End: 2024-10-29 | Stop reason: HOSPADM

## 2024-10-29 RX ORDER — ONDANSETRON 2 MG/ML
4 INJECTION INTRAMUSCULAR; INTRAVENOUS EVERY 30 MIN PRN
Status: DISCONTINUED | OUTPATIENT
Start: 2024-10-29 | End: 2024-10-29 | Stop reason: HOSPADM

## 2024-10-29 RX ORDER — METHADONE HYDROCHLORIDE 10 MG/ML
2 INJECTION, SOLUTION INTRAMUSCULAR; INTRAVENOUS; SUBCUTANEOUS 3 TIMES DAILY PRN
Status: DISCONTINUED | OUTPATIENT
Start: 2024-10-29 | End: 2024-10-29 | Stop reason: HOSPADM

## 2024-10-29 RX ORDER — KETOROLAC TROMETHAMINE 15 MG/ML
15 INJECTION, SOLUTION INTRAMUSCULAR; INTRAVENOUS
Status: DISCONTINUED | OUTPATIENT
Start: 2024-10-29 | End: 2024-10-29 | Stop reason: HOSPADM

## 2024-10-29 RX ORDER — MAGNESIUM SULFATE HEPTAHYDRATE 40 MG/ML
2 INJECTION, SOLUTION INTRAVENOUS
Status: DISCONTINUED | OUTPATIENT
Start: 2024-10-29 | End: 2024-10-29 | Stop reason: HOSPADM

## 2024-10-29 RX ORDER — NALOXONE HYDROCHLORIDE 0.4 MG/ML
0.1 INJECTION, SOLUTION INTRAMUSCULAR; INTRAVENOUS; SUBCUTANEOUS
Status: DISCONTINUED | OUTPATIENT
Start: 2024-10-29 | End: 2024-10-29 | Stop reason: HOSPADM

## 2024-10-29 RX ORDER — ONDANSETRON 4 MG/1
4 TABLET, ORALLY DISINTEGRATING ORAL EVERY 30 MIN PRN
Status: DISCONTINUED | OUTPATIENT
Start: 2024-10-29 | End: 2024-10-29 | Stop reason: HOSPADM

## 2024-10-29 RX ORDER — SODIUM CHLORIDE, SODIUM LACTATE, POTASSIUM CHLORIDE, CALCIUM CHLORIDE 600; 310; 30; 20 MG/100ML; MG/100ML; MG/100ML; MG/100ML
INJECTION, SOLUTION INTRAVENOUS CONTINUOUS PRN
Status: DISCONTINUED | OUTPATIENT
Start: 2024-10-29 | End: 2024-10-29

## 2024-10-29 RX ORDER — SODIUM CHLORIDE, SODIUM LACTATE, POTASSIUM CHLORIDE, CALCIUM CHLORIDE 600; 310; 30; 20 MG/100ML; MG/100ML; MG/100ML; MG/100ML
INJECTION, SOLUTION INTRAVENOUS CONTINUOUS
Status: DISCONTINUED | OUTPATIENT
Start: 2024-10-29 | End: 2024-10-29 | Stop reason: HOSPADM

## 2024-10-29 RX ORDER — PROPOFOL 10 MG/ML
INJECTION, EMULSION INTRAVENOUS CONTINUOUS PRN
Status: DISCONTINUED | OUTPATIENT
Start: 2024-10-29 | End: 2024-10-29

## 2024-10-29 RX ORDER — HYDROCODONE BITARTRATE AND ACETAMINOPHEN 5; 325 MG/1; MG/1
1 TABLET ORAL EVERY 6 HOURS PRN
Qty: 10 TABLET | Refills: 0 | Status: SHIPPED | OUTPATIENT
Start: 2024-10-29 | End: 2024-11-01

## 2024-10-29 RX ORDER — PROPOFOL 10 MG/ML
INJECTION, EMULSION INTRAVENOUS PRN
Status: DISCONTINUED | OUTPATIENT
Start: 2024-10-29 | End: 2024-10-29

## 2024-10-29 RX ORDER — HYDRALAZINE HYDROCHLORIDE 20 MG/ML
10 INJECTION INTRAMUSCULAR; INTRAVENOUS EVERY 10 MIN PRN
Status: DISCONTINUED | OUTPATIENT
Start: 2024-10-29 | End: 2024-10-29 | Stop reason: HOSPADM

## 2024-10-29 RX ORDER — KETOROLAC TROMETHAMINE 30 MG/ML
INJECTION, SOLUTION INTRAMUSCULAR; INTRAVENOUS PRN
Status: DISCONTINUED | OUTPATIENT
Start: 2024-10-29 | End: 2024-10-29

## 2024-10-29 RX ORDER — ONDANSETRON 2 MG/ML
INJECTION INTRAMUSCULAR; INTRAVENOUS PRN
Status: DISCONTINUED | OUTPATIENT
Start: 2024-10-29 | End: 2024-10-29

## 2024-10-29 RX ORDER — ACETAMINOPHEN 325 MG/1
975 TABLET ORAL ONCE
Status: DISCONTINUED | OUTPATIENT
Start: 2024-10-29 | End: 2024-10-29 | Stop reason: HOSPADM

## 2024-10-29 RX ORDER — LABETALOL HYDROCHLORIDE 5 MG/ML
10 INJECTION, SOLUTION INTRAVENOUS
Status: DISCONTINUED | OUTPATIENT
Start: 2024-10-29 | End: 2024-10-29 | Stop reason: HOSPADM

## 2024-10-29 RX ORDER — IBUPROFEN 800 MG/1
800 TABLET, FILM COATED ORAL ONCE
Status: DISCONTINUED | OUTPATIENT
Start: 2024-10-29 | End: 2024-10-29 | Stop reason: HOSPADM

## 2024-10-29 RX ORDER — HYDROMORPHONE HYDROCHLORIDE 2 MG/1
2 TABLET ORAL
Status: COMPLETED | OUTPATIENT
Start: 2024-10-29 | End: 2024-10-29

## 2024-10-29 RX ORDER — ACETAMINOPHEN 325 MG/1
975 TABLET ORAL ONCE
Status: COMPLETED | OUTPATIENT
Start: 2024-10-29 | End: 2024-10-29

## 2024-10-29 RX ORDER — LIDOCAINE 40 MG/G
CREAM TOPICAL
Status: DISCONTINUED | OUTPATIENT
Start: 2024-10-29 | End: 2024-10-29 | Stop reason: HOSPADM

## 2024-10-29 RX ORDER — IBUPROFEN 800 MG/1
800 TABLET, FILM COATED ORAL EVERY 6 HOURS PRN
Qty: 15 TABLET | Refills: 0 | Status: SHIPPED | OUTPATIENT
Start: 2024-10-29

## 2024-10-29 RX ORDER — DEXAMETHASONE SODIUM PHOSPHATE 4 MG/ML
INJECTION, SOLUTION INTRA-ARTICULAR; INTRALESIONAL; INTRAMUSCULAR; INTRAVENOUS; SOFT TISSUE PRN
Status: DISCONTINUED | OUTPATIENT
Start: 2024-10-29 | End: 2024-10-29

## 2024-10-29 RX ORDER — ALBUTEROL SULFATE 0.83 MG/ML
2.5 SOLUTION RESPIRATORY (INHALATION) EVERY 4 HOURS PRN
Status: DISCONTINUED | OUTPATIENT
Start: 2024-10-29 | End: 2024-10-29 | Stop reason: HOSPADM

## 2024-10-29 RX ORDER — LIDOCAINE HYDROCHLORIDE AND EPINEPHRINE 10; 10 MG/ML; UG/ML
INJECTION, SOLUTION INFILTRATION; PERINEURAL PRN
Status: DISCONTINUED | OUTPATIENT
Start: 2024-10-29 | End: 2024-10-29 | Stop reason: HOSPADM

## 2024-10-29 RX ADMIN — SODIUM CHLORIDE, POTASSIUM CHLORIDE, SODIUM LACTATE AND CALCIUM CHLORIDE: 600; 310; 30; 20 INJECTION, SOLUTION INTRAVENOUS at 08:07

## 2024-10-29 RX ADMIN — DEXAMETHASONE SODIUM PHOSPHATE 8 MG: 4 INJECTION, SOLUTION INTRA-ARTICULAR; INTRALESIONAL; INTRAMUSCULAR; INTRAVENOUS; SOFT TISSUE at 09:44

## 2024-10-29 RX ADMIN — PROPOFOL 200 MCG/KG/MIN: 10 INJECTION, EMULSION INTRAVENOUS at 09:44

## 2024-10-29 RX ADMIN — LIDOCAINE HYDROCHLORIDE 50 MG: 20 INJECTION, SOLUTION INFILTRATION; PERINEURAL at 09:44

## 2024-10-29 RX ADMIN — ROCURONIUM BROMIDE 50 MG: 50 INJECTION, SOLUTION INTRAVENOUS at 09:44

## 2024-10-29 RX ADMIN — SUGAMMADEX 150 MG: 100 INJECTION, SOLUTION INTRAVENOUS at 10:55

## 2024-10-29 RX ADMIN — METHADONE HYDROCHLORIDE 2 MG: 10 INJECTION, SOLUTION INTRAMUSCULAR; INTRAVENOUS; SUBCUTANEOUS at 11:53

## 2024-10-29 RX ADMIN — ACETAMINOPHEN 975 MG: 325 TABLET, FILM COATED ORAL at 07:40

## 2024-10-29 RX ADMIN — MIDAZOLAM 2 MG: 1 INJECTION INTRAMUSCULAR; INTRAVENOUS at 09:38

## 2024-10-29 RX ADMIN — METHADONE HYDROCHLORIDE 10 MG: 10 INJECTION, SOLUTION INTRAMUSCULAR; INTRAVENOUS; SUBCUTANEOUS at 09:44

## 2024-10-29 RX ADMIN — METHADONE HYDROCHLORIDE 2 MG: 10 INJECTION, SOLUTION INTRAMUSCULAR; INTRAVENOUS; SUBCUTANEOUS at 12:12

## 2024-10-29 RX ADMIN — PROPOFOL 140 MG: 10 INJECTION, EMULSION INTRAVENOUS at 09:44

## 2024-10-29 RX ADMIN — HYDROMORPHONE HYDROCHLORIDE 2 MG: 2 TABLET ORAL at 12:30

## 2024-10-29 RX ADMIN — KETOROLAC TROMETHAMINE 15 MG: 30 INJECTION, SOLUTION INTRAMUSCULAR at 09:44

## 2024-10-29 RX ADMIN — ONDANSETRON 4 MG: 2 INJECTION INTRAMUSCULAR; INTRAVENOUS at 09:44

## 2024-10-29 ASSESSMENT — ACTIVITIES OF DAILY LIVING (ADL)
ADLS_ACUITY_SCORE: 0

## 2024-10-29 NOTE — OP NOTE
Dr. Min Ferrari dictating for Meeta Rowell    Pre-op diagnosis: Large left ovarian cyst, desires permanent sterilization postop diagnosis: The same    Procedure laparoscopy, bilateral salpingectomy   Surgeon: Dr. Min Ferrari  Anesthesia General  Assistant: Dr. Roberto Carlos Reyes, his presence was requested secondary to the morbid obesity that the patient has and need for extra support and visualization and manipulation of surrounding tissue.  Complications: None  Estimated blood loss: Minimal  Pathology specimen is bilateral tubes and ovary containing its large cyst  Indications: Meeta is a 54-year-old who had an abnormal Pap smear with endometrial cells present.  In the evaluation of the ultrasound was done demonstrating a 7+ centimeter left ovarian cyst.  Findings: Significantly enlarged left ovarian cyst within the ovarian capsule, normal fallopian tubes bilaterally, uterine fundus with a prominent 3 cm fibroid    Operative procedures as follows    After adequate general anesthesia the patient was prepped and draped in usual sterile fashion.  Speculum placed in the vagina anterior lip of the cervix grasped with a single-tooth tenaculum.  Hulka cannula articulated to the cervix.  Klein was placed in the bladder and attention turned to the abdomen.    5 mm infraumbilical incision was made and Veress needle inserted.  Correct intraperitoneal position confirmed with saline drop method.  Trocar introduced and laparoscope after that and under direct visualization 5 mm left port was placed and a 10 mm right port was placed.  Pelvis was explored with findings as noted above.    The right fallopian tube was dissected off of its ovarian pedicle to the level of the uterus and  from the uterus.  It was removed from the surgical field and sent to pathology.  Attention turned to the left ovary and the left ovary had abundant adhesions to the bowel epiploic as well as the sidewall.  The    Adhesions were lysed  ensuring that the bowel was not involved.  The fallopian tube was dissected to the level of the uterus and  and the ovary set off to the side.    In an Endobag the tube and ovary was placed in the bag and brought out through the side port on the right side.  It was then dissected internally in the bag and released of its fluid contents and then was easily slid out of the side while.    Gagan-Mathew was used for reapproximating the left side fascia and good hemostasis had there.  Final pelvic examination was done and noted be hemostatic in all locations.  Pneumoperitoneum evacuated and skin edges reapproximated with 4-0 Vicryl.  Final vaginal check was done in the the Hulka cannula was removed.  Patient recall from general anesthesia without difficulty.  Should be discharged to home when awake alert tolerant p.o. and voiding discharge medications include ibuprofen and Norco

## 2024-10-29 NOTE — ANESTHESIA PREPROCEDURE EVALUATION
Anesthesia Pre-Procedure Evaluation    Patient: Meeta Rowell   MRN: [id number] : 1970        Procedure : Procedure(s):  LAPAROSCOPY, left oophrectomy, bilateral salpingectomy          Past Medical History:   Diagnosis Date    Cancer (H) 2015    thyroid    Crohn's disease (H)     Depression     Depressive disorder     Endometriosis     GERD (gastroesophageal reflux disease)     Personal history of other genital system and obstetric disorders(V13.29)     PONV (postoperative nausea and vomiting)     Regional enteritis of small intestine (H) 2003    ileal disease    Thyroid disease     hypo      Past Surgical History:   Procedure Laterality Date    BIOPSY      CHOLECYSTECTOMY      COLONOSCOPY      ENT SURGERY  2015    total thyroidectomy    THYROIDECTOMY N/A 2015    Procedure: THYROIDECTOMY;  Surgeon: Shari King MD;  Location:  OR    [facility] NONSPECIFIC PROCEDURE  2000    s/p cholecystectomy      Allergies   Allergen Reactions    Azathioprine     Ciprofloxacin Nausea and Vomiting    Sulfamethoxazole W/Trimethoprim       Social History     Tobacco Use    Smoking status: Never     Passive exposure: Never    Smokeless tobacco: Never   Substance Use Topics    Alcohol use: Yes     Comment: couple of times a year      Wt Readings from Last 1 Encounters:   10/29/24 86.2 kg (190 lb 1.6 oz)        Anesthesia Evaluation   Pt has had prior anesthetic.     History of anesthetic complications  - PONV.      ROS/MED HX  ENT/Pulmonary:  - neg pulmonary ROS     Neurologic:  - neg neurologic ROS     Cardiovascular:  - neg cardiovascular ROS     METS/Exercise Tolerance:     Hematologic:     (+)      anemia,          Musculoskeletal:  - neg musculoskeletal ROS     GI/Hepatic:     (+) GERD,      Inflammatory bowel disease,             Renal/Genitourinary:  - neg Renal ROS     Endo:     (+)  type II DM,        thyroid problem, hypothyroidism,    Obesity,       Psychiatric/Substance Use:    "  (+) psychiatric history depression and anxiety       Infectious Disease:  - neg infectious disease ROS     Malignancy:       Other:            Physical Exam    Airway        Mallampati: II   TM distance: > 3 FB   Neck ROM: full   Mouth opening: > 3 cm    Respiratory Devices and Support         Dental       (+) Minor Abnormalities - some fillings, tiny chips      Cardiovascular          Rhythm and rate: regular and normal     Pulmonary           breath sounds clear to auscultation           OUTSIDE LABS:  CBC:   Lab Results   Component Value Date    WBC 9.5 06/14/2024    WBC 8.2 06/30/2023    HGB 13.9 06/14/2024    HGB 11.1 (L) 06/30/2023    HCT 41.8 06/14/2024    HCT 36.6 06/30/2023     06/14/2024     06/30/2023     BMP:   Lab Results   Component Value Date     06/14/2024     12/28/2015    POTASSIUM 4.3 06/14/2024    POTASSIUM 4.1 12/28/2015    CHLORIDE 104 06/14/2024    CHLORIDE 103 12/28/2015    CO2 21 (L) 06/14/2024    CO2 23 12/28/2015    BUN 11.0 06/14/2024    BUN 11 12/28/2015    CR 0.89 06/14/2024    CR 0.81 12/28/2015    GLC 94 06/14/2024    GLC 89 12/28/2015     COAGS: No results found for: \"PTT\", \"INR\", \"FIBR\"  POC:   Lab Results   Component Value Date    HCG Negative 12/11/2015    HCGS Negative 02/03/2016     HEPATIC:   Lab Results   Component Value Date    ALBUMIN 2.8 07/13/2016    ALBUMIN 2.8 07/13/2016    PROTTOTAL 7.2 07/13/2016    PROTTOTAL 7.2 07/13/2016    ALT 16 04/08/2021    AST 13 04/08/2021    ALKPHOS 78 07/13/2016    ALKPHOS 78 07/13/2016    BILITOTAL 0.1 07/13/2016    BILITOTAL 0.1 07/13/2016    BILIDIRECT 0.05 11/30/2011     OTHER:   Lab Results   Component Value Date    A1C 5.4 06/14/2024    MARICARMEN 9.1 06/14/2024    PHOS 3.1 12/28/2015    LIPASE 125 11/07/2005    AMYLASE 90 11/07/2005    TSH 0.13 (L) 10/23/2024    T4 1.79 (H) 10/23/2024    SED 27 (H) 09/01/2015       Anesthesia Plan    ASA Status:  2    NPO Status:  NPO Appropriate    Anesthesia Type: General.     - " "Airway: ETT   Induction: Intravenous.   Maintenance: TIVA.        Consents    Anesthesia Plan(s) and associated risks, benefits, and realistic alternatives discussed. Questions answered and patient/representative(s) expressed understanding.     - Discussed:     - Discussed with:  Patient      - Extended Intubation/Ventilatory Support Discussed: No.      - Patient is DNR/DNI Status: No     Use of blood products discussed: No .     Postoperative Care    Pain management: IV analgesics, Oral pain medications, Multi-modal analgesia.     - Plan for long acting post-op opioid use   PONV prophylaxis: Dexamethasone or Solumedrol, Ondansetron (or other 5HT-3), Background Propofol Infusion     Comments:    Other Comments:   Methadone 10 mg  Decadron 8 mg  Zofran 4 mg  Toradol 15 mg  Propofol TIVA  Sugammadex if paralytic to be used            Zach Finley MD                           # Obesity: Estimated body mass index is 35.92 kg/m  as calculated from the following:    Height as of 10/15/24: 1.549 m (5' 1\").    Weight as of this encounter: 86.2 kg (190 lb 1.6 oz).             "

## 2024-10-29 NOTE — ANESTHESIA PROCEDURE NOTES
Airway       Patient location during procedure: OR       Procedure Start/Stop Times: 10/29/2024 9:48 AM  Staff -        Performed By: CRNA  Consent for Airway        Urgency: elective  Indications and Patient Condition       Indications for airway management: dawood-procedural and airway protection       Induction type:intravenous       Mask difficulty assessment: 0 - not attempted    Final Airway Details       Final airway type: endotracheal airway       Successful airway: ETT - single and Oral  Endotracheal Airway Details        ETT size (mm): 7.0       Cuffed: yes       Successful intubation technique: direct laryngoscopy       DL Blade Type: Maldonado 2       Grade View of Cords: 1       Adjucts: stylet       Position: Right       Measured from: lips       Secured at (cm): 22       Bite block used: None    Post intubation assessment        Placement verified by: capnometry and equal breath sounds        Number of attempts at approach: 1       Number of other approaches attempted: 0       Secured with: tape       Ease of procedure: easy       Dentition: Intact    Medication(s) Administered   Medication Administration Time: 10/29/2024 9:48 AM

## 2024-10-29 NOTE — ANESTHESIA POSTPROCEDURE EVALUATION
Patient: Meeta Rowell    Procedure: Procedure(s):  LAPAROSCOPY, LEFT OOPHORECTOMY, BILATERAL SALPINGECTOMY       Anesthesia Type:  General    Note:  Disposition: Outpatient   Postop Pain Control: Uneventful            Sign Out: Well controlled pain   PONV: No   Neuro/Psych: Uneventful            Sign Out: Acceptable/Baseline neuro status   Airway/Respiratory: Uneventful            Sign Out: Acceptable/Baseline resp. status   CV/Hemodynamics: Uneventful            Sign Out: Acceptable CV status   Other NRE: NONE   DID A NON-ROUTINE EVENT OCCUR? No           Last vitals:  Vitals Value Taken Time   /79 10/29/24 1250   Temp 97.7  F (36.5  C) 10/29/24 1103   Pulse 67 10/29/24 1304   Resp 14 10/29/24 1245   SpO2 90 % 10/29/24 1305   Vitals shown include unfiled device data.    Electronically Signed By: Zach Finley MD  October 29, 2024  2:26 PM

## 2024-10-29 NOTE — BRIEF OP NOTE
[facility]    Brief Operative Note    Pre-operative diagnosis: Left ovarian cyst [N83.202]  Post-operative diagnosis Same as pre-operative diagnosis    Procedure: LAPAROSCOPY, LEFT OOPHORECTOMY, BILATERAL SALPINGECTOMY, Left - Abdomen    Surgeon: Surgeons and Role:     * Min Ferrari MD - Primary     * Hayden Reyes MD - Assisting  Anesthesia: General   Estimated Blood Loss: Minimal    Drains: None  Specimens:   ID Type Source Tests Collected by Time Destination   1 : RIGHT FALLOPIAN TUBE Tissue Fallopian Tube, Right SURGICAL PATHOLOGY EXAM Min Ferrari MD 10/29/2024 10:16 AM    2 : LEFT OVARY AND FALLOPIAN TUBE Tissue Ovary and Fallopian Tube, Left SURGICAL PATHOLOGY EXAM Min Ferrari MD 10/29/2024 10:26 AM      Findings:   None.  Complications: None.  Implants: * No implants in log *

## 2024-10-29 NOTE — DISCHARGE INSTRUCTIONS
Maximum acetaminophen (Tylenol) dose from all sources should not exceed 4 grams (4000 mg) per day. You had 975mg at 7:40am. You can take your next dose after 1:40pm      You received Toradol, an IV form of Ibuprofen (Motrin) at 9:44am.  Do not take any Ibuprofen products until 3:44pm.       DR. HORACIO HERZOG M.D.    CLINIC PHONE NUMBER:  [phone]  [facility]

## 2024-10-29 NOTE — ANESTHESIA CARE TRANSFER NOTE
Patient: Meeta Rowell    Procedure: Procedure(s):  LAPAROSCOPY, LEFT OOPHORECTOMY, BILATERAL SALPINGECTOMY       Diagnosis: Left ovarian cyst [N83.202]  Diagnosis Additional Information: No value filed.    Anesthesia Type:   General     Note:    Oropharynx: spontaneously breathing  Level of Consciousness: drowsy  Oxygen Supplementation: face mask    Independent Airway: airway patency satisfactory and stable  Dentition: dentition unchanged  Vital Signs Stable: post-procedure vital signs reviewed and stable  Report to RN Given: handoff report given  Patient transferred to: PACU    Handoff Report: Identifed the Patient, Identified the Reponsible Provider, Reviewed the pertinent medical history, Discussed the surgical course, Reviewed Intra-OP anesthesia mangement and issues during anesthesia, Set expectations for post-procedure period and Allowed opportunity for questions and acknowledgement of understanding      Vitals:  Vitals Value Taken Time   /60 10/29/24 1103   Temp     Pulse 62 10/29/24 1105   Resp     SpO2 99 % 10/29/24 1105   Vitals shown include unfiled device data.    Electronically Signed By: SHANE Ortega CRNA  October 29, 2024  11:06 AM

## 2024-10-30 LAB
PATH REPORT.COMMENTS IMP SPEC: NORMAL
PATH REPORT.FINAL DX SPEC: NORMAL
PATH REPORT.GROSS SPEC: NORMAL
PATH REPORT.MICROSCOPIC SPEC OTHER STN: NORMAL
PATH REPORT.RELEVANT HX SPEC: NORMAL
PHOTO IMAGE: NORMAL

## 2024-10-31 ENCOUNTER — MYC REFILL (OUTPATIENT)
Dept: ENDOCRINOLOGY | Facility: CLINIC | Age: 54
End: 2024-10-31
Payer: COMMERCIAL

## 2024-10-31 ENCOUNTER — TELEPHONE (OUTPATIENT)
Dept: OBGYN | Facility: CLINIC | Age: 54
End: 2024-10-31
Payer: COMMERCIAL

## 2024-10-31 DIAGNOSIS — E89.0 S/P TOTAL THYROIDECTOMY: ICD-10-CM

## 2024-10-31 DIAGNOSIS — C73 THYROID CANCER (H): ICD-10-CM

## 2024-10-31 NOTE — TELEPHONE ENCOUNTER
Requested Prescriptions   Pending Prescriptions Disp Refills    levothyroxine (SYNTHROID/LEVOTHROID) 200 MCG tablet 90 tablet 1     Sig: Take 200 mcg 6 days a week and skip X1 day a week.       Thyroid Protocol Failed - 10/31/2024  2:53 PM        Failed - Normal TSH on file in past 12 months     Recent Labs   Lab Test 10/23/24  1138   TSH 0.13*              Passed - Patient is 12 years or older        Passed - Medication is active on med list        Passed - Recent (12 mo) or future (90 days) visit within the authorizing provider's specialty     The patient must have completed an in-person or virtual visit within the past 12 months or has a future visit scheduled within the next 90 days with the authorizing provider s specialty.  Urgent care and e-visits do not quality as an office visit for this protocol.          Passed - Medication indicated for associated diagnosis     Medication is associated with one or more of the following diagnoses:  Hypothyroidism  Thyroid stimulating hormone suppression therapy  Thyroid cancer  Acquired atrophy of thyroid          Passed - No active pregnancy on record     If patient is pregnant or has had a positive pregnancy test, please check TSH.          Passed - No positive pregnancy test in past 12 months     If patient is pregnant or has had a positive pregnancy test, please check TSH.

## 2024-10-31 NOTE — TELEPHONE ENCOUNTER
Spoke with patient    Hx of  laparoscopy, left oophorectomy, bilateral salpingectomy  on 10/29    Pt using ibuprofen, NORCO, and ice packs for pain.     Out of NORCO and still in quite a bit of pain.     Requesting refill    Pharmacy updated    Please advise    DAT Whiting

## 2024-11-01 RX ORDER — LEVOTHYROXINE SODIUM 200 UG/1
TABLET ORAL
Qty: 90 TABLET | Refills: 1 | Status: SHIPPED | OUTPATIENT
Start: 2024-11-01

## 2024-11-01 NOTE — TELEPHONE ENCOUNTER
Latest Ref Rng 10/23/2024  11:38 AM   ENDO THYROID LABS-[facility]     TSH 0.30 - 4.20 uIU/mL 0.13 (L)    Free T3 2.3 - 4.2 pg/mL    FREE T4 0.90 - 1.70 ng/dL 1.79 (H)       Legend:  (L) Low  (H) High    Last visit 1/2024  H/o thyroid cancer and postsurgical hypothyroidism.  Currently taking levothryoxine  200 mcg X 6 days a week and skip X1 day a week.    Decrease dose of levothyroxine.  Based on 10/204 labs-- take  levothryoxine  200 mcg X 5 days a week , 100 mcg X 1 day a week and skip X1 day a week.    Labs in 6-7 weeks  Please make a lab appointment for blood work and follow up clinic appointment in 1 week after that to discuss results.  KERWIN wilder.  Help schedule.

## 2024-11-04 ENCOUNTER — TELEPHONE (OUTPATIENT)
Dept: OBGYN | Facility: CLINIC | Age: 54
End: 2024-11-04
Payer: COMMERCIAL

## 2024-11-04 NOTE — TELEPHONE ENCOUNTER
----- Message from Min Ferrari sent at 11/4/2024  5:21 AM CST -----  Regarding: Refill  Please reach out to Meeta and see how she is doing post op.  If she still needs a refill let me know.  I'm not working to day but if you call me I'll log on later today to fill it.  Thanks.    DB

## 2024-11-04 NOTE — TELEPHONE ENCOUNTER
Min Ferrari MD Olinger, Amanda F RN; [facility] Triage4 hours ago (4:38 AM)     DB  Pt should be using over the counter measures for post op pain.  She was given enough meds for 3 days post op.  She does have a controlled substance flag.  I suggest she use the Ibuprofen, tylenol and heating pad.  Follow up appointment would be indicated    SARAH

## 2024-11-05 NOTE — PROGRESS NOTES
SUBJECTIVE:        Today Meeta is seen for postop visit.  She had a laparoscopic procedure that involves removal of the left ovary and fallopian tube as well as the right fallopian tube.  Her left ovary had a substantial size serous cystadenoma which is considered benign.  Her postop care was complicated by a pain from the incision site on her right side where the cyst was taken out in a bag.  That incision is still crusted over.  But otherwise appears healthy.    We talked about her pathology.  Read through that together and then also read through her path report as well as photographs.  Explained the significance and the findings that were present at the time of surgery.  She has not returned to work yet.  She works casually and states that she will probably go back to work next week.    Examination:  She is pleasant and appropriate no apparent distress  Incisions are healing well.  There is no signs of inflammation, infection, induration.    Assessment is postop state    Plan return to clinic as needed.                                                 Min Ferrari MD  [facility]

## 2024-11-08 ENCOUNTER — TRANSFERRED RECORDS (OUTPATIENT)
Dept: HEALTH INFORMATION MANAGEMENT | Facility: CLINIC | Age: 54
End: 2024-11-08
Payer: COMMERCIAL

## 2024-11-12 ENCOUNTER — OFFICE VISIT (OUTPATIENT)
Dept: OBGYN | Facility: CLINIC | Age: 54
End: 2024-11-12
Payer: COMMERCIAL

## 2024-11-12 ENCOUNTER — MYC MEDICAL ADVICE (OUTPATIENT)
Dept: PEDIATRICS | Facility: CLINIC | Age: 54
End: 2024-11-12

## 2024-11-12 VITALS — WEIGHT: 191 LBS | SYSTOLIC BLOOD PRESSURE: 140 MMHG | BODY MASS INDEX: 36.09 KG/M2 | DIASTOLIC BLOOD PRESSURE: 78 MMHG

## 2024-11-12 DIAGNOSIS — E66.01 CLASS 2 SEVERE OBESITY DUE TO EXCESS CALORIES WITH SERIOUS COMORBIDITY AND BODY MASS INDEX (BMI) OF 38.0 TO 38.9 IN ADULT (H): ICD-10-CM

## 2024-11-12 DIAGNOSIS — Z98.890 POSTOPERATIVE STATE: Primary | ICD-10-CM

## 2024-11-12 DIAGNOSIS — R73.01 IMPAIRED FASTING GLUCOSE: ICD-10-CM

## 2024-11-12 DIAGNOSIS — E66.812 CLASS 2 SEVERE OBESITY DUE TO EXCESS CALORIES WITH SERIOUS COMORBIDITY AND BODY MASS INDEX (BMI) OF 38.0 TO 38.9 IN ADULT (H): ICD-10-CM

## 2024-11-12 PROCEDURE — 99212 OFFICE O/P EST SF 10 MIN: CPT | Performed by: OBSTETRICS & GYNECOLOGY

## 2024-11-12 RX ORDER — METFORMIN HYDROCHLORIDE 500 MG/1
2000 TABLET, EXTENDED RELEASE ORAL
Qty: 360 TABLET | Refills: 1 | Status: SHIPPED | OUTPATIENT
Start: 2024-11-12

## 2024-11-12 NOTE — NURSING NOTE
"Chief Complaint   Patient presents with    Post-op Visit       Initial BP (!) 140/78   Wt 86.6 kg (191 lb)   BMI 36.09 kg/m   Estimated body mass index is 36.09 kg/m  as calculated from the following:    Height as of 10/15/24: 1.549 m (5' 1\").    Weight as of this encounter: 86.6 kg (191 lb).  BP completed using cuff size: regular    Questioned patient about current smoking habits.  Pt. has never smoked.          The following HM Due: NONE      Emily Bal LPN on 2024 at 2:02 PM             "

## 2024-11-12 NOTE — TELEPHONE ENCOUNTER
Updated sig as patient states taking 4 tablets daily in the morning. See 11/12/24 [employer] message.    Thanks!  Ivon DIETZ RN, BSN  Clinic RN  [facility]

## 2024-11-12 NOTE — TELEPHONE ENCOUNTER
Left message as well as sent mychart message to inquire on current metformin dosing.  Ivon DIETZ RN, BSN  Clinic RN  [facility]

## 2024-11-12 NOTE — TELEPHONE ENCOUNTER
Clinic RN: Please investigate patient's chart or contact patient if the information cannot be found because the last prescription was a taper dose (not in RN protocol). We need to know what dose patient is taking. Determine the current dose, then route to provider and ask provider to update the SIG and approve or deny the prescription.    Teetee SHARIF RN  [facility]

## 2024-12-24 DIAGNOSIS — F33.1 MAJOR DEPRESSIVE DISORDER, RECURRENT, MODERATE (H): ICD-10-CM

## 2024-12-24 RX ORDER — BUSPIRONE HYDROCHLORIDE 30 MG/1
30 TABLET ORAL 2 TIMES DAILY
Qty: 60 TABLET | Refills: 0 | Status: SHIPPED | OUTPATIENT
Start: 2024-12-24

## 2025-01-19 DIAGNOSIS — F33.1 MAJOR DEPRESSIVE DISORDER, RECURRENT, MODERATE (H): ICD-10-CM

## 2025-01-20 ENCOUNTER — MYC MEDICAL ADVICE (OUTPATIENT)
Dept: PEDIATRICS | Facility: CLINIC | Age: 55
End: 2025-01-20
Payer: COMMERCIAL

## 2025-01-22 RX ORDER — BUSPIRONE HYDROCHLORIDE 30 MG/1
30 TABLET ORAL 2 TIMES DAILY
Qty: 60 TABLET | Refills: 0 | Status: SHIPPED | OUTPATIENT
Start: 2025-01-22

## 2025-01-22 ASSESSMENT — ANXIETY QUESTIONNAIRES
GAD7 TOTAL SCORE: 5
GAD7 TOTAL SCORE: 5
6. BECOMING EASILY ANNOYED OR IRRITABLE: SEVERAL DAYS
IF YOU CHECKED OFF ANY PROBLEMS ON THIS QUESTIONNAIRE, HOW DIFFICULT HAVE THESE PROBLEMS MADE IT FOR YOU TO DO YOUR WORK, TAKE CARE OF THINGS AT HOME, OR GET ALONG WITH OTHER PEOPLE: SOMEWHAT DIFFICULT
7. FEELING AFRAID AS IF SOMETHING AWFUL MIGHT HAPPEN: NOT AT ALL
7. FEELING AFRAID AS IF SOMETHING AWFUL MIGHT HAPPEN: NOT AT ALL
3. WORRYING TOO MUCH ABOUT DIFFERENT THINGS: SEVERAL DAYS
1. FEELING NERVOUS, ANXIOUS, OR ON EDGE: SEVERAL DAYS
2. NOT BEING ABLE TO STOP OR CONTROL WORRYING: SEVERAL DAYS
4. TROUBLE RELAXING: SEVERAL DAYS
8. IF YOU CHECKED OFF ANY PROBLEMS, HOW DIFFICULT HAVE THESE MADE IT FOR YOU TO DO YOUR WORK, TAKE CARE OF THINGS AT HOME, OR GET ALONG WITH OTHER PEOPLE?: SOMEWHAT DIFFICULT
5. BEING SO RESTLESS THAT IT IS HARD TO SIT STILL: NOT AT ALL
GAD7 TOTAL SCORE: 5

## 2025-01-22 NOTE — TELEPHONE ENCOUNTER
8/20/2024     1:25 PM 10/15/2024     1:20 PM 1/22/2025     3:45 PM   PHQ-9 SCORE   PHQ-9 Total Score MyChart Incomplete 8 (Mild depression) 8 (Mild depression)   PHQ-9 Total Score  8 8        Patient-reported          8/20/2024     1:18 PM 8/20/2024     1:25 PM 1/22/2025     3:45 PM   JANIS-7 SCORE   Total Score 17 (severe anxiety) 17 (severe anxiety) 5 (mild anxiety)   Total Score 17 17 5        Patient-reported

## 2025-01-22 NOTE — TELEPHONE ENCOUNTER
Called and spoke with patient. Advised of [employer] mgs and questions. Patient prefers to do via [employer].       CASEY Maurice MA

## 2025-01-22 NOTE — TELEPHONE ENCOUNTER
Refilled 1 time only, please call patient to get updated phq and meredith.  I sent them via [employer] but patient didn't read them.

## 2025-02-07 DIAGNOSIS — F33.1 MAJOR DEPRESSIVE DISORDER, RECURRENT, MODERATE (H): ICD-10-CM

## 2025-02-07 RX ORDER — BUSPIRONE HYDROCHLORIDE 30 MG/1
30 TABLET ORAL 2 TIMES DAILY
Qty: 180 TABLET | Refills: 0 | Status: SHIPPED | OUTPATIENT
Start: 2025-02-07

## 2025-02-07 NOTE — TELEPHONE ENCOUNTER
Phq and meredith reviewed, stable. Fill, have pt follow-up via [employer] or vrt with pcp before next fills        4/3/2024     1:06 PM 10/15/2024     1:20 PM 1/22/2025     3:45 PM   PHQ   PHQ-9 Total Score 5 8 8    Q9: Thoughts of better off dead/self-harm past 2 weeks Not at all Not at all Not at all       Patient-reported         8/20/2024     1:18 PM 8/20/2024     1:25 PM 1/22/2025     3:45 PM   MEREDITH-7 SCORE   Total Score 17 (severe anxiety) 17 (severe anxiety) 5 (mild anxiety)   Total Score 17 17 5        Patient-reported

## 2025-02-10 ENCOUNTER — MYC MEDICAL ADVICE (OUTPATIENT)
Dept: PEDIATRICS | Facility: CLINIC | Age: 55
End: 2025-02-10
Payer: COMMERCIAL

## 2025-02-14 ENCOUNTER — TRANSFERRED RECORDS (OUTPATIENT)
Dept: HEALTH INFORMATION MANAGEMENT | Facility: CLINIC | Age: 55
End: 2025-02-14
Payer: COMMERCIAL

## 2025-03-11 ENCOUNTER — VIRTUAL VISIT (OUTPATIENT)
Dept: ENDOCRINOLOGY | Facility: CLINIC | Age: 55
End: 2025-03-11
Payer: COMMERCIAL

## 2025-03-11 VITALS — WEIGHT: 200 LBS | HEIGHT: 61 IN | BODY MASS INDEX: 37.76 KG/M2

## 2025-03-11 DIAGNOSIS — C73 THYROID CANCER (H): ICD-10-CM

## 2025-03-11 DIAGNOSIS — E89.0 S/P TOTAL THYROIDECTOMY: ICD-10-CM

## 2025-03-11 PROCEDURE — 98006 SYNCH AUDIO-VIDEO EST MOD 30: CPT | Performed by: INTERNAL MEDICINE

## 2025-03-11 PROCEDURE — G2211 COMPLEX E/M VISIT ADD ON: HCPCS | Performed by: INTERNAL MEDICINE

## 2025-03-11 PROCEDURE — 1126F AMNT PAIN NOTED NONE PRSNT: CPT | Mod: 95 | Performed by: INTERNAL MEDICINE

## 2025-03-11 RX ORDER — LEVOTHYROXINE SODIUM 200 UG/1
TABLET ORAL
Qty: 90 TABLET | Refills: 2 | Status: SHIPPED | OUTPATIENT
Start: 2025-03-11

## 2025-03-11 ASSESSMENT — PAIN SCALES - GENERAL: PAINLEVEL_OUTOF10: NO PAIN (0)

## 2025-03-11 NOTE — NURSING NOTE
Patient reviewed medications and allergies in [employer] during e-check in and said everything looked correct.      Current patient location: [address] [place] [address]   [place] [address]    Is the patient currently in the state of [place]? YES    Visit mode: VIDEO    If the visit is dropped, the patient can be reconnected by:VIDEO VISIT: Text to cell phone:   Telephone Information:   Mobile [phone]    and VIDEO VISIT: Send to e-mail at: [email or web address]    Will anyone else be joining the visit? NO  (If patient encounters technical issues they should call [phone])    Are changes needed to the allergy or medication list? Pt declined med review and Pt stated no med changes    Are refills needed on medications prescribed by this physician? YES    Rooming Documentation:  Questionnaire(s) completed    Reason for visit: SHANTE STOCK

## 2025-03-11 NOTE — PROGRESS NOTES
"THIS IS A VIDEO VISIT:    Phone call visit/virtual visit encounter:    Name of patient: Meeta Rowell    Date of encounter: 3/11/2025    Time of start of video visit: 1:28    Video started: 1:38    Video ended: 1:44    Provider location: working from home/ [facility]    Patient location: patients home.    Mode of transmission: video/ Doximity    Verbal consent: obtained before starting visit. Pt is agreeable.      The patient has been notified of following:      \"This VIDEO visit will be conducted via a call between you and your physician/provider. We have found that certain health care needs can be provided without the need for a physical exam.  This service lets us provide the care you need with a short phone conversation.  If a prescription is necessary we can send it directly to your pharmacy.  If lab work is needed we can place an order for that and you can then stop by our lab to have the test done at a later time.     With new updates with corona virus patient might be billed as clinic visit.     If during the course of the call the physician/provider feels a telephone visit is not appropriate, you will not be charged for this service.\"      Past medical history, social history, family history, allergy and medications were reviewed and updated as appropriate.  Reviewed pertinent labs, notes, imaging studies personally.    Name: Meeta Rowell  F/u for postsurgical hypothyroidism and PTC. IRINA  and Nancy Garcia.  HPI:  Meeta Rowell is a 53 year old female for above.   has a past medical history of Cancer (H) (12/11/2015), Crohn's disease (H), Depression, Depressive disorder, Endometriosis, GERD (gastroesophageal reflux disease), Personal history of other genital system and obstetric disorders(V13.29), PONV (postoperative nausea and vomiting), Regional enteritis of small intestine (H) (01/01/2003), and Thyroid disease.    H/o Chrons disease-- followed by GI.    1. Thyroid cancer:  She " "initially presented for the evaluation of thyroid nodule, first noted in 2013.  She was previously seen for this by Dr. Berry and recalls FNA biopsy which was \"benign\".  Then  she noted enlarged neck lymph nodes.  Repeat FNA biopsy of the 3.0 cm right thyroid nodule showed follicular lesion of undetermined significance. She underwent total thyroidectomy 12/11/2015 after Frozen section confirmed a follicular lesion and a small papillary carcinoma of the thyroid .  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 No abnormal activity to suggest  metastatic thyroid cancer.  Follow-up neck ultrasound 7/2016: No enlarged or abnormal-appearing lymph nodes are appreciated in the right or left neck.  TG ( suppressed ) : 1.3 ( 1/27/16)  TG ( stimulated ) : 2.7 ( 2/3/16)  F/u : 8/2018- no mets identified.  TG appears suppressed as noted below.      2. Postsurgical hypothyroidism:  She was started on Levothyroxine 150 mcg after surgery, dose was increased to 175, then increased to  dose 200 mcg qd.  Current  Dose: levothryoxine  200 mcg X 5 days a week , 100 mcg X 1 day a week and skip X1 day a week.  On this dose X 10/2024.    No follow up labs to review.  No major concerns today.  Takes generic.  reports compliance.    Feeling fine  Energy is low.  S/p menopause.     Chest pain, palpitations, tremors.  History of Crohn's disease and has on and off diarrhea. Stable.  Lost about 30 lbs in 2024 and gained 20 lbs back.    Family history is positive for mother with hypothyroidism.    No family history of thyroid cancer.  "   No personal history of radiation exposure to the head or neck.    She has a history of Chron's disease and has been treated with Remicade for many years.    She is a nurse.      Patient feels well at this time and denies any tachycardia, palpitations, heat intolerance, tremor, insomnia, diarrhea, or unexplained weight loss.  Patient also denies  cold intolerance, constipation, or unexplained weight gain.     Wt Readings from Last 2 Encounters:   03/11/25 90.7 kg (200 lb)   11/12/24 86.6 kg (191 lb)       PMH/PSH:  Past Medical History:   Diagnosis Date    Cancer (H) 12/11/2015    thyroid    Crohn's disease (H)     Depression     Depressive disorder     Endometriosis     GERD (gastroesophageal reflux disease)     Personal history of other genital system and obstetric disorders(V13.29)     PONV (postoperative nausea and vomiting)     Regional enteritis of small intestine (H) 01/01/2003    ileal disease    Thyroid disease     hypo     Past Surgical History:   Procedure Laterality Date    BIOPSY      CHOLECYSTECTOMY      COLONOSCOPY      ENT SURGERY  12/01/2015    total thyroidectomy    LAPAROSCOPIC OOPHORECTOMY Left 10/29/2024    Procedure: LAPAROSCOPY, LEFT OOPHORECTOMY, BILATERAL SALPINGECTOMY;  Surgeon: Min Ferrari MD;  Location: [facility] OR    SALPINGECTOMY Bilateral 10/29/2024    Procedure: Salpingectomy;  Surgeon: Min Ferrari MD;  Location: [facility] OR    THYROIDECTOMY N/A 12/11/2015    Procedure: THYROIDECTOMY;  Surgeon: Shari King MD;  Location: [facility] OR    ZZC NONSPECIFIC PROCEDURE  01/01/2000    s/p cholecystectomy     Family Hx:  Family History   Problem Relation Age of Onset    Hypertension Mother     Cervical Cancer Mother     Gastrointestinal Disease Mother         diverticulitis    Cardiovascular Mother     Hypothyroidism Mother     Coronary Artery Disease Mother     Depression Mother     Hypertension Father     Substance Abuse Father         And mother    Diabetes Paternal Grandmother      Cerebrovascular Disease Paternal Grandmother      Thyroid disease: Yes, mother         DM2: No         Autoimmune: DM1, SLE, RA, Vitiligo:  No    Social Hx:  Social History     Socioeconomic History    Marital status:      Spouse name: Not on file    Number of children: 2    Years of education: Not on file    Highest education level: Associate degree: occupational, technical, or vocational program   Occupational History    Occupation: nurse     Employer: [employer]     Comment: cardiology nurse   Tobacco Use    Smoking status: Never     Passive exposure: Never    Smokeless tobacco: Never   Vaping Use    Vaping status: Never Used   Substance and Sexual Activity    Alcohol use: Yes     Comment: couple of times a year    Drug use: No    Sexual activity: Yes     Partners: Male     Birth control/protection: Pill, Male Surgical     Comment: Vasectomy   Other Topics Concern    Parent/sibling w/ CABG, MI or angioplasty before 65F 55M? No   Social History Narrative    Older son, Atul,  , with developmental delay     Social Drivers of Health     Financial Resource Strain: Low Risk  (2023)    Overall Financial Resource Strain (CARDIA)     Difficulty of Paying Living Expenses: Not very hard   Food Insecurity: No Food Insecurity (2023)    Hunger Vital Sign     Worried About Running Out of Food in the Last Year: Never true     Ran Out of Food in the Last Year: Never true   Transportation Needs: No Transportation Needs (2023)    PRAPARE - Transportation     Lack of Transportation (Medical): No     Lack of Transportation (Non-Medical): No   Physical Activity: Inactive (2023)    Exercise Vital Sign     Days of Exercise per Week: 0 days     Minutes of Exercise per Session: 0 min   Stress: Stress Concern Present (2023)    [employer] [place] - Occupational Stress Questionnaire     Feeling of Stress : To some extent   Social Connections: Socially Isolated  "(6/29/2023)    Social Connection and Isolation Panel [NHANES]     Frequency of Communication with Friends and Family: Once a week     Frequency of Social Gatherings with Friends and Family: Once a week     Attends [employer] Services: Never     Active Member of Clubs or Organizations: No     Attends Club or Organization Meetings: Not on file     Marital Status:    Interpersonal Safety: Low Risk  (10/29/2024)    Interpersonal Safety     Do you feel physically and emotionally safe where you currently live?: Yes     Within the past 12 months, have you been hit, slapped, kicked or otherwise physically hurt by someone?: No     Within the past 12 months, have you been humiliated or emotionally abused in other ways by your partner or ex-partner?: No   Housing Stability: Low Risk  (6/29/2023)    Housing Stability Vital Sign     Unable to Pay for Housing in the Last Year: No     Number of Places Lived in the Last Year: 1     Unstable Housing in the Last Year: No          MEDICATIONS:  has a current medication list which includes the following prescription(s): alprazolam, bupropion, buspirone hcl, cyanocobalamin, ferrous gluconate, ferrous sulfate, fluoxetine, ibuprofen, infliximab (remicade), lansoprazole, levothyroxine, metformin, propranolol er, trazodone, and vitamin d.    Review of Systems  10 point ROS neg other than the symptoms noted above in the HPI.    Physical Exam   VS: Ht 1.549 m (5' 1\")   Wt 90.7 kg (200 lb)   BMI 37.79 kg/m    GENERAL: healthy, alert and no distress  EYES: Eyes grossly normal to inspection, conjunctivae and sclerae normal  ENT: no nose swelling, nasal discharge.  Thyroid: s/p thyroidectomy  RESP: no audible wheeze, cough, or visible cyanosis.  No visible retractions or increased work of breathing.  Able to speak fully in complete sentences.  ABDO: not evaluated.  EXTREMITIES: no hand tremors.  NEURO: Cranial nerves grossly intact, mentation intact and speech normal  SKIN: No apparent " skin lesions, rash or edema seen   PSYCH: mentation appears normal, affect normal/bright, judgement and insight intact, normal speech and appearance well-groomed      LABS:  TG:  TG ( suppressed ) : 1.3 ( 1/27/16), TSH 7.83  TG ( stimulated ) : 2.7 ( 2/3/16), TSH 1.28    6/2016:  TSH: 1.28  JUAN: <0.4  TG: <0.1    12/2016:  TSH: 0.62  JUAN: <0.4  TG: <0.1    5/2020:  TSH: 0.07  JUAN: <0.4  TG: <0.1    12/2022:  JUAN: <0.4  TG: <0.1    12/2023:  JUAN: <0.4  TG: <0.1    TFTs:   Latest Ref Rng 10/23/2024  11:38 AM   ENDO THYROID LABS-[facility]     TSH 0.30 - 4.20 uIU/mL 0.13 (L)    Free T3 2.3 - 4.2 pg/mL    FREE T4 0.90 - 1.70 ng/dL 1.79 (H)         Surgical path:  FINAL DIAGNOSIS:  A, B, and C.  Specimens: Right thyroid lobe, prelaryngeal tissue, left thyroid  lobe.  Procedure: Total thyroidectomy.  Received: Fresh.  Specimen Integrity: Divided (thyroidectomy performed as  lobectomy and completion thyroidectomy).  Tumor Focality: Four (4) foci of microcarcinomas .  Tumor Laterality: Right lobe (x2) and left lobe (x2).    Tumor Histologic Type (all tumors): Papillary microcarcinoma;  follicular variant, infiltrative.    Tumor Size:  Tumor #1 (right lobe): 0.8 cm.  Tumor #2 (right lobe): 0.6 cm.  Tumor #3 (left lobe): 0.2 cm.  Tumor #4 (left lobe): 0.25 cm.    Margins:  Tumor #1: Involved right anterolateral margin over an area of 0.4  cm.  Tumor #2: Tumor within 0.1 cm of the nearest (right anterolateral)  margin.  Tumor #3: Tumor within fraction of a mm of the left anterolateral  and medial margins.  Tumor #4: Tumor within fraction of a millimeter of the left  anterolateral margin.    Regional Lymph Nodes: No nodes submitted or found.    Pathologic Staging (pTNM): pT1a(m), pNX, pM not applicable.    Additional Pathologic Findings:  -Hyperplastic nodules (largest 2 cm wide; within right lobe).  -Single normal-appearing parathyroid gland identified within left  lobe, mid region.  -Prelaryngeal tissue (specimen C) comprises of  benign fibrovascular  tissue without lymph nodes.  Prior biopsy site changes.      NM I-131 THYROID THERAPY  2/3/2016 9:38 AM     HISTORY: Thyroid cancer.     TECHNIQUE: Patient was given 100 mCi I-131orally and instructed in  home care.         IMPRESSION: Successful I-131 therapy administration.       NUCLEAR MEDICINE I-131 WHOLE BODY SCAN February 10, 2016 8:08 AM    IMPRESSION: No metastatic disease demonstrated.    Follow up Neck US 7/2016:                                    IMPRESSION: No enlarged or abnormal-appearing lymph nodes are  appreciated in the right or left neck.    WBC I123:  FINDINGS: Physiologic activity noted. No abnormal activity to suggest  metastatic thyroid cancer.                                                                IMPRESSION: No metastatic disease demonstrated.    Follow up Neck US 8/2024:  IMPRESSION:  1. Status post thyroidectomy without evidence of residual thyroid  tissue or lymphadenopathy in the visualized portions of the neck.    All pertinent notes, labs and images personally reviewed by me.     A/P  Ms.Heather SERGE Rowell is a 54 year old here for the evaluation of:    #1.  PTC:   FNA biopsy of the 3.0 cm right thyroid nodule showed follicular lesion of undetermined significance. 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 No abnormal activity to suggest metastatic thyroid cancer.  Follow up US 6/2016- no mets  F/u I123 scan  8/2018- no mets  US neck and TG appear stable  Plan:   Discussed diagnosis, pathophysiology, management and treatment options of condition with pt.  Labs, neck US in 6-7 months  Please make a lab appointment for blood work and follow up clinic appointment in 1 week after that to discuss results.    #2. Postsurgical hypothyroidism:    CUrrent  Dose: levothryoxine  200 mcg X 5 days a week , 100 mcg X 1 day a week and skip X1 day a week.  On this dose X 10/2024.  No major concerns today.  Takes Generic.   Clinically looks euthyroid. Goal TSH <1.0  + intentional wt loss 15 lbs. Now stable.  Plan:  Discussed diagnosis, pathophysiology, management and treatment options of condition with pt.  Labs needed. Need to make lab only appointment.  Dose change based on that  Continue levothyroxine  200 mcg X 5 days a week , 100 mcg X 1 day a week and skip X1 day a week pending labs.  Discussed s/s of hypothyroidism and hyperthyroidism to watch for.  The patient indicates understanding of these issues and agrees with the plan.    Plan: levothyroxine (SYNTHROID/LEVOTHROID) 200 MCG         tablet, T4 free, TSH, Thyroglobulin and         Antibody (Sendout to [facility]), T4 free, TSH,         Thyroglobulin and Antibody (Sendout to [facility]), US        Head Neck Soft Tissue        Discussed indications, risks and benefits of all medications prescribed, and answered questions to patient's satisfaction.  The longitudinal plan of care for the diagnosis(es)/condition(s) as documented were addressed during this visit. Due to the added complexity in care, I will continue to support Meeta in the subsequent management and with ongoing continuity of care.  All questions were answered.  The patient indicates understanding of the above issues and agrees with the plan set forth.    Follow-up:  As noted in AVS    Alice Donis MD  Endocrinology   [facility]/[place]    Addendum to above note and clinic visit:    Labs reviewed.    See result  note/telephone encounter.

## 2025-03-11 NOTE — LETTER
"3/11/2025      Meeta Rowell  [address] [place] [address]      Dear Colleague,    Thank you for referring your patient, Meeta Rowell, to the [facility]. Please see a copy of my visit note below.    THIS IS A VIDEO VISIT:    Phone call visit/virtual visit encounter:    Name of patient: Meeta Rowell    Date of encounter: 3/11/2025    Time of start of video visit: 1:28    Video started: 1:38    Video ended: 1:44    Provider location: working from home/ [facility]    Patient location: patients home.    Mode of transmission: video/ Doximity    Verbal consent: obtained before starting visit. Pt is agreeable.      The patient has been notified of following:      \"This VIDEO visit will be conducted via a call between you and your physician/provider. We have found that certain health care needs can be provided without the need for a physical exam.  This service lets us provide the care you need with a short phone conversation.  If a prescription is necessary we can send it directly to your pharmacy.  If lab work is needed we can place an order for that and you can then stop by our lab to have the test done at a later time.     With new updates with corona virus patient might be billed as clinic visit.     If during the course of the call the physician/provider feels a telephone visit is not appropriate, you will not be charged for this service.\"      Past medical history, social history, family history, allergy and medications were reviewed and updated as appropriate.  Reviewed pertinent labs, notes, imaging studies personally.    Name: Meeta Rowell  F/u for postsurgical hypothyroidism and PTC. IRINA  and Nancy Garcia.  HPI:  Meeta Rowell is a 53 year old female for above.   has a past medical history of Cancer (H) (12/11/2015), Crohn's disease (H), Depression, Depressive disorder, Endometriosis, GERD (gastroesophageal reflux disease), Personal history " "of other genital system and obstetric disorders(V13.29), PONV (postoperative nausea and vomiting), Regional enteritis of small intestine (H) (01/01/2003), and Thyroid disease.    H/o Chrons disease-- followed by GI.    1. Thyroid cancer:  She initially presented for the evaluation of thyroid nodule, first noted in 2013.  She was previously seen for this by Dr. Berry and recalls FNA biopsy which was \"benign\".  Then  she noted enlarged neck lymph nodes.  Repeat FNA biopsy of the 3.0 cm right thyroid nodule showed follicular lesion of undetermined significance. She underwent total thyroidectomy 12/11/2015 after Frozen section confirmed a follicular lesion and a small papillary carcinoma of the thyroid .  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 No abnormal activity to suggest  metastatic thyroid cancer.  Follow-up neck ultrasound 7/2016: No enlarged or abnormal-appearing lymph nodes are appreciated in the right or left neck.  TG ( suppressed ) : 1.3 ( 1/27/16)  TG ( stimulated ) : 2.7 ( 2/3/16)  F/u : 8/2018- no mets identified.  TG appears suppressed as noted below.      2. Postsurgical hypothyroidism:  She was started on Levothyroxine 150 mcg after surgery, dose was increased to 175, then increased to  dose 200 mcg qd.  Current  Dose: levothryoxine  200 mcg X 5 days a week , 100 mcg X 1 day a week and skip X1 day a week.  On this dose X 10/2024.    No follow up labs to review.  No major concerns today.  Takes generic.  reports compliance.    Feeling fine  Energy is low.  S/p menopause.     Chest pain, " palpitations, tremors.  History of Crohn's disease and has on and off diarrhea. Stable.  Lost about 30 lbs in 2024 and gained 20 lbs back.    Family history is positive for mother with hypothyroidism.    No family history of thyroid cancer.    No personal history of radiation exposure to the head or neck.    She has a history of Chron's disease and has been treated with Remicade for many years.    She is a nurse.      Patient feels well at this time and denies any tachycardia, palpitations, heat intolerance, tremor, insomnia, diarrhea, or unexplained weight loss.  Patient also denies  cold intolerance, constipation, or unexplained weight gain.     Wt Readings from Last 2 Encounters:   03/11/25 90.7 kg (200 lb)   11/12/24 86.6 kg (191 lb)       PMH/PSH:  Past Medical History:   Diagnosis Date     Cancer (H) 12/11/2015    thyroid     Crohn's disease (H)      Depression      Depressive disorder      Endometriosis      GERD (gastroesophageal reflux disease)      Personal history of other genital system and obstetric disorders(V13.29)      PONV (postoperative nausea and vomiting)      Regional enteritis of small intestine (H) 01/01/2003    ileal disease     Thyroid disease     hypo     Past Surgical History:   Procedure Laterality Date     BIOPSY       CHOLECYSTECTOMY       COLONOSCOPY       ENT SURGERY  12/01/2015    total thyroidectomy     LAPAROSCOPIC OOPHORECTOMY Left 10/29/2024    Procedure: LAPAROSCOPY, LEFT OOPHORECTOMY, BILATERAL SALPINGECTOMY;  Surgeon: Min Ferrari MD;  Location: [facility] OR     SALPINGECTOMY Bilateral 10/29/2024    Procedure: Salpingectomy;  Surgeon: Min Ferrari MD;  Location: [facility] OR     THYROIDECTOMY N/A 12/11/2015    Procedure: THYROIDECTOMY;  Surgeon: Shari King MD;  Location: [facility] OR     ZZC NONSPECIFIC PROCEDURE  01/01/2000    s/p cholecystectomy     Family Hx:  Family History   Problem Relation Age of Onset     Hypertension Mother      Cervical Cancer Mother       Gastrointestinal Disease Mother         diverticulitis     Cardiovascular Mother      Hypothyroidism Mother      Coronary Artery Disease Mother      Depression Mother      Hypertension Father      Substance Abuse Father         And mother     Diabetes Paternal Grandmother      Cerebrovascular Disease Paternal Grandmother      Thyroid disease: Yes, mother         DM2: No         Autoimmune: DM1, SLE, RA, Vitiligo:  No    Social Hx:  Social History     Socioeconomic History     Marital status:      Spouse name: Not on file     Number of children: 2     Years of education: Not on file     Highest education level: Associate degree: occupational, technical, or vocational program   Occupational History     Occupation: nurse     Employer: [employer]     Comment: cardiology nurse   Tobacco Use     Smoking status: Never     Passive exposure: Never     Smokeless tobacco: Never   Vaping Use     Vaping status: Never Used   Substance and Sexual Activity     Alcohol use: Yes     Comment: couple of times a year     Drug use: No     Sexual activity: Yes     Partners: Male     Birth control/protection: Pill, Male Surgical     Comment: Vasectomy   Other Topics Concern     Parent/sibling w/ CABG, MI or angioplasty before 65F 55M? No   Social History Narrative    Older son, Atul,  , with developmental delay     Social Drivers of Health     Financial Resource Strain: Low Risk  (2023)    Overall Financial Resource Strain (CARDIA)      Difficulty of Paying Living Expenses: Not very hard   Food Insecurity: No Food Insecurity (2023)    Hunger Vital Sign      Worried About Running Out of Food in the Last Year: Never true      Ran Out of Food in the Last Year: Never true   Transportation Needs: No Transportation Needs (2023)    PRAPARE - Transportation      Lack of Transportation (Medical): No      Lack of Transportation (Non-Medical): No   Physical Activity: Inactive (2023)    Exercise Vital  "Sign      Days of Exercise per Week: 0 days      Minutes of Exercise per Session: 0 min   Stress: Stress Concern Present (6/29/2023)    [employer] [place] [employer] - Occupational Stress Questionnaire      Feeling of Stress : To some extent   Social Connections: Socially Isolated (6/29/2023)    Social Connection and Isolation Panel [NHANES]      Frequency of Communication with Friends and Family: Once a week      Frequency of Social Gatherings with Friends and Family: Once a week      Attends Quaker Services: Never      Active Member of Clubs or Organizations: No      Attends Club or Organization Meetings: Not on file      Marital Status:    Interpersonal Safety: Low Risk  (10/29/2024)    Interpersonal Safety      Do you feel physically and emotionally safe where you currently live?: Yes      Within the past 12 months, have you been hit, slapped, kicked or otherwise physically hurt by someone?: No      Within the past 12 months, have you been humiliated or emotionally abused in other ways by your partner or ex-partner?: No   Housing Stability: Low Risk  (6/29/2023)    Housing Stability Vital Sign      Unable to Pay for Housing in the Last Year: No      Number of Places Lived in the Last Year: 1      Unstable Housing in the Last Year: No          MEDICATIONS:  has a current medication list which includes the following prescription(s): alprazolam, bupropion, buspirone hcl, cyanocobalamin, ferrous gluconate, ferrous sulfate, fluoxetine, ibuprofen, infliximab (remicade), lansoprazole, levothyroxine, metformin, propranolol er, trazodone, and vitamin d.    Review of Systems  10 point ROS neg other than the symptoms noted above in the HPI.    Physical Exam   VS: Ht 1.549 m (5' 1\")   Wt 90.7 kg (200 lb)   BMI 37.79 kg/m    GENERAL: healthy, alert and no distress  EYES: Eyes grossly normal to inspection, conjunctivae and sclerae normal  ENT: no nose swelling, nasal discharge.  Thyroid: s/p " thyroidectomy  RESP: no audible wheeze, cough, or visible cyanosis.  No visible retractions or increased work of breathing.  Able to speak fully in complete sentences.  ABDO: not evaluated.  EXTREMITIES: no hand tremors.  NEURO: Cranial nerves grossly intact, mentation intact and speech normal  SKIN: No apparent skin lesions, rash or edema seen   PSYCH: mentation appears normal, affect normal/bright, judgement and insight intact, normal speech and appearance well-groomed      LABS:  TG:  TG ( suppressed ) : 1.3 ( 1/27/16), TSH 7.83  TG ( stimulated ) : 2.7 ( 2/3/16), TSH 1.28    6/2016:  TSH: 1.28  JUAN: <0.4  TG: <0.1    12/2016:  TSH: 0.62  JUAN: <0.4  TG: <0.1    5/2020:  TSH: 0.07  JUAN: <0.4  TG: <0.1    12/2022:  JUAN: <0.4  TG: <0.1    12/2023:  JUAN: <0.4  TG: <0.1    TFTs:   Latest Ref Rng 10/23/2024  11:38 AM   ENDO THYROID LABS-[facility]     TSH 0.30 - 4.20 uIU/mL 0.13 (L)    Free T3 2.3 - 4.2 pg/mL    FREE T4 0.90 - 1.70 ng/dL 1.79 (H)         Surgical path:  FINAL DIAGNOSIS:  A, B, and C.  Specimens: Right thyroid lobe, prelaryngeal tissue, left thyroid  lobe.  Procedure: Total thyroidectomy.  Received: Fresh.  Specimen Integrity: Divided (thyroidectomy performed as  lobectomy and completion thyroidectomy).  Tumor Focality: Four (4) foci of microcarcinomas .  Tumor Laterality: Right lobe (x2) and left lobe (x2).    Tumor Histologic Type (all tumors): Papillary microcarcinoma;  follicular variant, infiltrative.    Tumor Size:  Tumor #1 (right lobe): 0.8 cm.  Tumor #2 (right lobe): 0.6 cm.  Tumor #3 (left lobe): 0.2 cm.  Tumor #4 (left lobe): 0.25 cm.    Margins:  Tumor #1: Involved right anterolateral margin over an area of 0.4  cm.  Tumor #2: Tumor within 0.1 cm of the nearest (right anterolateral)  margin.  Tumor #3: Tumor within fraction of a mm of the left anterolateral  and medial margins.  Tumor #4: Tumor within fraction of a millimeter of the left  anterolateral margin.    Regional Lymph Nodes: No nodes  submitted or found.    Pathologic Staging (pTNM): pT1a(m), pNX, pM not applicable.    Additional Pathologic Findings:  -Hyperplastic nodules (largest 2 cm wide; within right lobe).  -Single normal-appearing parathyroid gland identified within left  lobe, mid region.  -Prelaryngeal tissue (specimen C) comprises of benign fibrovascular  tissue without lymph nodes.  Prior biopsy site changes.      NM I-131 THYROID THERAPY  2/3/2016 9:38 AM     HISTORY: Thyroid cancer.     TECHNIQUE: Patient was given 100 mCi I-131orally and instructed in  home care.         IMPRESSION: Successful I-131 therapy administration.       NUCLEAR MEDICINE I-131 WHOLE BODY SCAN February 10, 2016 8:08 AM    IMPRESSION: No metastatic disease demonstrated.    Follow up Neck US 7/2016:                                    IMPRESSION: No enlarged or abnormal-appearing lymph nodes are  appreciated in the right or left neck.    WBC I123:  FINDINGS: Physiologic activity noted. No abnormal activity to suggest  metastatic thyroid cancer.                                                                IMPRESSION: No metastatic disease demonstrated.    Follow up Neck US 8/2024:  IMPRESSION:  1. Status post thyroidectomy without evidence of residual thyroid  tissue or lymphadenopathy in the visualized portions of the neck.    All pertinent notes, labs and images personally reviewed by me.     A/P  Ms.Heather SERGE Rowell is a 54 year old here for the evaluation of:    #1.  PTC:   FNA biopsy of the 3.0 cm right thyroid nodule showed follicular lesion of undetermined significance. 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 No abnormal activity to suggest metastatic thyroid cancer.  Follow up US 6/2016- no mets  F/u I123 scan 8/2018- no mets  US neck and TG appear stable  Plan:   Discussed diagnosis, pathophysiology, management and treatment options of condition with pt.  Labs, neck US in 6-7 months  Please make a lab appointment for blood work and follow up clinic appointment in 1 week after that to discuss results.    #2. Postsurgical hypothyroidism:    CUrrent  Dose: levothryoxine  200 mcg X 5 days a week , 100 mcg X 1 day a week and skip X1 day a week.  On this dose X 10/2024.  No major concerns today.  Takes Generic.   Clinically looks euthyroid. Goal TSH <1.0  + intentional wt loss 15 lbs. Now stable.  Plan:  Discussed diagnosis, pathophysiology, management and treatment options of condition with pt.  Labs needed. Need to make lab only appointment.  Dose change based on that  Continue levothyroxine  200 mcg X 5 days a week , 100 mcg X 1 day a week and skip X1 day a week pending labs.  Discussed s/s of hypothyroidism and hyperthyroidism to watch for.  The patient indicates understanding of these issues and agrees with the plan.    Plan: levothyroxine (SYNTHROID/LEVOTHROID) 200 MCG         tablet, T4 free, TSH, Thyroglobulin and         Antibody (Sendout to [facility]), T4 free, TSH,         Thyroglobulin and Antibody (Sendout to [facility]), US        Head Neck Soft Tissue        Discussed indications, risks and benefits of all medications prescribed, and answered questions to patient's satisfaction.  The longitudinal plan of care for the diagnosis(es)/condition(s) as documented were addressed during this visit. Due to the added complexity in care, I will continue to support Meeta in the subsequent management and with ongoing  continuity of care.  All questions were answered.  The patient indicates understanding of the above issues and agrees with the plan set forth.    Follow-up:  As noted in AVS    Alice Donis MD  Endocrinology   [facility]    Addendum to above note and clinic visit:    Labs reviewed.    See result note/telephone encounter.      Again, thank you for allowing me to participate in the care of your patient.        Sincerely,        Alice Donis MD    Electronically signed

## 2025-03-11 NOTE — PATIENT INSTRUCTIONS
-[facility]  Dr Donis, Endocrinology Department    [facility]   [address] [id number]  [place] [address]  Appointment [phone]  Fax: [phone]  [place]: Monday - Thursday      Labs needed. Need to make lab only appointment.  Dose change based on that  Continue levothyroxine  200 mcg X 5 days a week , 100 mcg X 1 day a week and skip X1 day a week pending labs.  Labs, neck US in 6-7 months  Please make a lab appointment for blood work and follow up clinic appointment in 1 week after that to discuss results.  ( Recommend in person visit)     [facility] radiology scheduleing   [place]  [phone]   [facility] Radiology scheduling  [place]  [phone]     Please call and schedule the recommended test as discussed in clinic visit. These are the numbers to call.    Take Levothyroxine on an empty stomach. Take it with a full glass of water at least 30 minutes to 1 hour before eating breakfast.   This medicine should be taken at least 4 hours before or 4 hours after these medicines: antacids (Maalox , Mylanta , Tums ), calcium supplements, cholestyramine (Prevalite , Questran ), colestipol (Colestid ), iron supplements, orlistat (Arjun , Xenical ), simethicone (Gas-X , Mylicon ), and sucralfate (Carafate ).   Swallow the capsule whole. Do not cut or crush it.

## 2025-03-14 ENCOUNTER — TRANSFERRED RECORDS (OUTPATIENT)
Dept: HEALTH INFORMATION MANAGEMENT | Facility: CLINIC | Age: 55
End: 2025-03-14
Payer: COMMERCIAL

## 2025-04-26 DIAGNOSIS — F33.1 MAJOR DEPRESSIVE DISORDER, RECURRENT, MODERATE (H): ICD-10-CM

## 2025-04-29 RX ORDER — BUSPIRONE HYDROCHLORIDE 30 MG/1
30 TABLET ORAL 2 TIMES DAILY
Qty: 180 TABLET | Refills: 0 | Status: SHIPPED | OUTPATIENT
Start: 2025-04-29

## 2025-05-26 ENCOUNTER — MYC REFILL (OUTPATIENT)
Dept: PEDIATRICS | Facility: CLINIC | Age: 55
End: 2025-05-26
Payer: COMMERCIAL

## 2025-05-26 DIAGNOSIS — F40.243 FEAR OF FLYING: ICD-10-CM

## 2025-05-27 RX ORDER — ALPRAZOLAM 0.25 MG
.25-.5 TABLET ORAL 2 TIMES DAILY PRN
Qty: 8 TABLET | Refills: 0 | Status: SHIPPED | OUTPATIENT
Start: 2025-05-27

## 2025-05-31 ENCOUNTER — RESULTS FOLLOW-UP (OUTPATIENT)
Dept: ENDOCRINOLOGY | Facility: CLINIC | Age: 55
End: 2025-05-31

## 2025-06-21 DIAGNOSIS — F51.04 PSYCHOPHYSIOLOGICAL INSOMNIA: ICD-10-CM

## 2025-06-23 DIAGNOSIS — F41.1 GENERALIZED ANXIETY DISORDER: ICD-10-CM

## 2025-06-23 RX ORDER — TRAZODONE HYDROCHLORIDE 100 MG/1
TABLET ORAL
Qty: 360 TABLET | Refills: 0 | Status: SHIPPED | OUTPATIENT
Start: 2025-06-23

## 2025-06-23 RX ORDER — FLUOXETINE HYDROCHLORIDE 40 MG/1
80 CAPSULE ORAL DAILY
Qty: 180 CAPSULE | Refills: 0 | Status: SHIPPED | OUTPATIENT
Start: 2025-06-23

## 2025-06-27 ENCOUNTER — RESULTS FOLLOW-UP (OUTPATIENT)
Dept: PEDIATRICS | Facility: CLINIC | Age: 55
End: 2025-06-27

## 2025-07-16 ENCOUNTER — PATIENT OUTREACH (OUTPATIENT)
Dept: CARE COORDINATION | Facility: CLINIC | Age: 55
End: 2025-07-16
Payer: COMMERCIAL

## 2025-08-13 ENCOUNTER — TRANSFERRED RECORDS (OUTPATIENT)
Dept: ADMINISTRATIVE | Facility: CLINIC | Age: 55
End: 2025-08-13
Payer: COMMERCIAL

## 2025-08-13 ENCOUNTER — PATIENT OUTREACH (OUTPATIENT)
Dept: CARE COORDINATION | Facility: CLINIC | Age: 55
End: 2025-08-13
Payer: COMMERCIAL

## 2025-08-13 ENCOUNTER — TRANSFERRED RECORDS (OUTPATIENT)
Dept: MULTI SPECIALTY CLINIC | Facility: CLINIC | Age: 55
End: 2025-08-13
Payer: COMMERCIAL

## 2025-08-13 LAB
ALT SERPL-CCNC: 28 IU/L (ref 0–32)
AST SERPL-CCNC: 35 IU/L (ref 0–40)

## (undated) DEVICE — PAD CHUX UNDERPAD 30X36" P3036C

## (undated) DEVICE — NDL INSUFFLATION 13GA 120MM C2201

## (undated) DEVICE — DEVICE SUTURE GRASPER TROCAR CLOSURE 14GA PMITCSG

## (undated) DEVICE — ESU GROUND PAD ADULT W/CORD E7507

## (undated) DEVICE — LINEN TOWEL PACK X10 5473

## (undated) DEVICE — PACK GYN LAPAROSCOPY RIDGES

## (undated) DEVICE — SU VICRYL 4-0 PS-2 18" UND J496H

## (undated) DEVICE — ENDO POUCH UNIV RETRIEVAL SYSTEM INZII 10MM CD001

## (undated) DEVICE — SOL NACL 0.9% IRRIG 3000ML BAG 2B7477

## (undated) DEVICE — ENDO TROCAR SLEEVE KII Z-THREADED 05X100MM CTS02

## (undated) DEVICE — LINEN FULL SHEET 5511

## (undated) DEVICE — DRSG STERI STRIP 1/2X4" R1547

## (undated) DEVICE — LINEN HALF SHEET 5512

## (undated) DEVICE — GLOVE BIOGEL PI ULTRATOUCH SZ 8.0 41180

## (undated) DEVICE — SUCTION IRR STRYKERFLOW II W/TIP 250-070-520

## (undated) DEVICE — BAG CLEAR TRASH 1.3M 39X33" P4040C

## (undated) DEVICE — SU VICRYL 0 TIE 12X18" J906G

## (undated) DEVICE — ESU LIGASURE XP LAPAROSCOPIC MARYLAND 37CMX5MM LXMJ37S

## (undated) DEVICE — ENDO TROCAR FIRST ENTRY KII FIOS Z-THRD 05X100MM CTF03

## (undated) DEVICE — GLOVE BIOGEL PI ULTRATOUCH SZ 7.5 41175

## (undated) DEVICE — ENDO POUCH UNIVERSAL RETRIEVAL SYSTEM INZII 12/15MM CD004

## (undated) DEVICE — ENDO TROCAR FIRST ENTRY KII FIOS Z-THRD 11X100MM CTF33

## (undated) DEVICE — CATH TRAY FOLEY COUDE SURESTEP 16FR W/DRN BAG LATEX A304416A

## (undated) DEVICE — DRAPE MAYO STAND 23X54 8337

## (undated) RX ORDER — PROPOFOL 10 MG/ML
INJECTION, EMULSION INTRAVENOUS
Status: DISPENSED
Start: 2024-10-29

## (undated) RX ORDER — ACETAMINOPHEN 325 MG/1
TABLET ORAL
Status: DISPENSED
Start: 2024-10-29

## (undated) RX ORDER — DEXAMETHASONE SODIUM PHOSPHATE 4 MG/ML
INJECTION, SOLUTION INTRA-ARTICULAR; INTRALESIONAL; INTRAMUSCULAR; INTRAVENOUS; SOFT TISSUE
Status: DISPENSED
Start: 2024-10-29

## (undated) RX ORDER — HYDROMORPHONE HYDROCHLORIDE 2 MG/1
TABLET ORAL
Status: DISPENSED
Start: 2024-10-29

## (undated) RX ORDER — METHADONE HYDROCHLORIDE 10 MG/ML
INJECTION, SOLUTION INTRAMUSCULAR; INTRAVENOUS; SUBCUTANEOUS
Status: DISPENSED
Start: 2024-10-29

## (undated) RX ORDER — KETOROLAC TROMETHAMINE 30 MG/ML
INJECTION, SOLUTION INTRAMUSCULAR; INTRAVENOUS
Status: DISPENSED
Start: 2024-10-29

## (undated) RX ORDER — GLYCOPYRROLATE 0.2 MG/ML
INJECTION, SOLUTION INTRAMUSCULAR; INTRAVENOUS
Status: DISPENSED
Start: 2024-10-29

## (undated) RX ORDER — LIDOCAINE HYDROCHLORIDE 10 MG/ML
INJECTION, SOLUTION EPIDURAL; INFILTRATION; INTRACAUDAL; PERINEURAL
Status: DISPENSED
Start: 2024-10-29

## (undated) RX ORDER — LIDOCAINE HYDROCHLORIDE AND EPINEPHRINE 10; 10 MG/ML; UG/ML
INJECTION, SOLUTION INFILTRATION; PERINEURAL
Status: DISPENSED
Start: 2024-10-29

## (undated) RX ORDER — ONDANSETRON 2 MG/ML
INJECTION INTRAMUSCULAR; INTRAVENOUS
Status: DISPENSED
Start: 2024-10-29